# Patient Record
Sex: MALE | Race: BLACK OR AFRICAN AMERICAN | Employment: PART TIME | URBAN - METROPOLITAN AREA
[De-identification: names, ages, dates, MRNs, and addresses within clinical notes are randomized per-mention and may not be internally consistent; named-entity substitution may affect disease eponyms.]

---

## 2017-01-06 ENCOUNTER — OFFICE VISIT (OUTPATIENT)
Dept: NEUROLOGY | Age: 67
End: 2017-01-06

## 2017-01-06 ENCOUNTER — HOSPITAL ENCOUNTER (OUTPATIENT)
Dept: MRI IMAGING | Age: 67
Discharge: HOME | End: 2017-01-06
Attending: PSYCHIATRY & NEUROLOGY
Payer: COMMERCIAL

## 2017-01-06 VITALS
SYSTOLIC BLOOD PRESSURE: 142 MMHG | WEIGHT: 214.4 LBS | OXYGEN SATURATION: 97 % | BODY MASS INDEX: 29.9 KG/M2 | DIASTOLIC BLOOD PRESSURE: 86 MMHG | HEART RATE: 91 BPM

## 2017-01-06 DIAGNOSIS — M62.89 PROXIMAL WEAKNESS OF LIMB: Primary | ICD-10-CM

## 2017-01-06 DIAGNOSIS — M62.89 PROXIMAL WEAKNESS OF LIMB: ICD-10-CM

## 2017-01-06 PROCEDURE — 72141 MRI NECK SPINE W/O DYE: CPT

## 2017-01-06 NOTE — LETTER
1/6/2017 11:25 AM 
 
Patient:  Laurel Neff YOB: 1950 Date of Visit: 1/6/2017 Dear Gurvinder Gutierres MD 
[facility] [facility] [address] [place] [address] VIA In Basket 
 : Thank you for referring Mr. Gavin Glover to me for evaluation/treatment. Below are the relevant portions of my assessment and plan of care. If you have questions, please do not hesitate to call me. I look forward to following Mr. Fam Salcedo along with you. Sincerely, Barbie Chahal MD

## 2017-01-06 NOTE — MR AVS SNAPSHOT
Visit Information Date & Time Provider Department Dept. Phone Encounter #  
 1/6/2017 10:00 AM Earnest Quinn MD Neurology [address] [facility] [phone] [id number] Follow-up Instructions Return for review of results. Your Appointments 1/25/2017  8:20 AM  
ROUTINE CARE with Wesley Campa MD  
[facility] [facility]) Appt Note: 4 mnth fu  HTNHyperlipidemiaEarly am appt for fasting [address] [address]  
[place] [address] [address] Upcoming Health Maintenance Date Due ZOSTER VACCINE AGE 60> 3/18/2012 Pneumococcal 19-64 Highest Risk (3 of 3 - PPSV23) 9/1/2020 DTaP/Tdap/Td series (2 - Td) 11/11/2024 COLONOSCOPY 10/4/2026 Allergies as of 1/6/2017  Review Complete On: 1/6/2017 By: Earnest Quinn MD  
  
 Severity Noted Reaction Type Reactions Lipitor [Atorvastatin]  04/27/2010    Other (comments) Myalgia Simvastatin  04/27/2010    Other (comments)  
 myalgia Current Immunizations  Reviewed on 1/4/2016 Name Date Influenza Vaccine 10/23/2013 Influenza Vaccine (Quad) PF 9/21/2016, 10/16/2015 Influenza Vaccine Split 10/26/2012, 10/21/2011, 11/10/2010, 12/3/2009 Pneumococcal Conjugate (PCV-13) 11/30/2016 Pneumococcal Polysaccharide (PPSV-23) 9/1/2015 Tdap 11/11/2014 Not reviewed this visit You Were Diagnosed With   
  
 Codes Comments Proximal weakness of limb    -  Primary ICD-10-CM: M62.89 ICD-9-CM: 729.89 Vitals BP Pulse Weight(growth percentile) SpO2 BMI Smoking Status 142/86 91 214 lb 6.4 oz (97.3 kg) 97% 29.9 kg/m2 Former Smoker BMI and BSA Data Body Mass Index Body Surface Area  
 29.9 kg/m 2 2.21 m 2 Preferred Pharmacy Pharmacy Name Phone [address] [place], [place] - [address] [phone] Your Updated Medication List  
  
   
 This list is accurate as of: 1/6/17 11:08 AM.  Always use your most recent med list. amLODIPine 2.5 mg tablet Commonly known as:  [place] Take 1 Tab by mouth daily. aspirin-dipyridamole  mg per SR capsule Commonly known as:  AGGRENOX Take 1 Cap by mouth two (2) times a day. avanafil 100 mg tablet Commonly known as:  RYERKDG Take  by mouth as needed for Other.  
  
 ezetimibe 10 mg tablet Commonly known as:  Drew [place] TAKE ONE TABLET BY MOUTH EVERY DAY  
  
 FISH OIL 1,000 mg Cap Generic drug:  omega-3 fatty acids-vitamin e Take 1 Cap by mouth two (2) times a day. fluticasone 50 mcg/actuation nasal spray Commonly known as:  Alric Eaves 2 Sprays by Both Nostrils route daily. levothyroxine 150 mcg tablet Commonly known as:  SYNTHROID Take 1 Tab by mouth Daily (before breakfast). * MAG-OXIDE 400 mg tablet Generic drug:  magnesium oxide Take 400 mg by mouth daily. * magnesium oxide 400 mg tablet Commonly known as:  MAG-OXIDE Take 1 Tab by mouth daily. pravastatin 40 mg tablet Commonly known as:  PRAVACHOL  
TAKE ONE TABLET BY MOUTH AT NIGHT ON MONDAY, WEDNESDAY & FRIDAY, (TAKE CoQ 10 100MG TWICE DAILY IF CRAMPS OCCUR)  Indications: HYPERLIPIDEMIA  
  
 VITAMIN B-12 1,000 mcg tablet Generic drug:  cyanocobalamin Take 1,000 mcg by mouth daily. * Notice: This list has 2 medication(s) that are the same as other medications prescribed for you. Read the directions carefully, and ask your doctor or other care provider to review them with you. Follow-up Instructions Return for review of results. To-Do List   
 01/06/2017 Neurology:  EMG LIMITED   
  
 01/06/2017 Imaging:  MRI CERV SPINE WO CONT Patient Instructions PRESCRIPTION REFILL POLICY [facility] Statement to Patients April 1, 2014 In an effort to ensure the large volume of patient prescription refills is processed in the most efficient and expeditious manner, we are asking our patients to assist us by calling your Pharmacy for all prescription refills, this will include also your  Mail Order Pharmacy. The pharmacy will contact our office electronically to continue the refill process. Please do not wait until the last minute to call your pharmacy. We need at least 48 hours (2days) to fill prescriptions. We also encourage you to call your pharmacy before going to  your prescription to make sure it is ready. With regard to controlled substance prescription refill requests (narcotic refills) that need to be picked up at our office, we ask your cooperation by providing us with at least 72 hours (3days) notice that you will need a refill. We will not refill narcotic prescription refill requests after 4:00pm on any weekday, Monday through Thursday, or after 2:00pm on Fridays, or on the weekends. We encourage everyone to explore another way of getting your prescription refill request processed using [employer], our patient web portal through our electronic medical record system. [employer] is an efficient and effective way to communicate your medication request directly to the office and  downloadable as an letitia on your smart phone . [employer] also features a review functionality that allows you to view your medication list as well as leave messages for your physician. Are you ready to get connected? If so please review the attatched instructions or speak to any of our staff to get you set up right away! Thank you so much for your cooperation. Should you have any questions please contact our Practice Administrator. The Physicians and Staff,  [facility] Please provide this summary of care documentation to your next provider. Your primary care clinician is listed as [address].  If you have any questions after today's visit, please call [phone].

## 2017-01-06 NOTE — PROGRESS NOTES
NEUROLOGY NEW PATIENT OFFICE CONSULTATION      1/6/2017    RE: Eliceo Olivera         1950      REFERRED BY:  Placido Rodriguez MD        CHIEF COMPLAINT:  This is Eliceo Olivera is a 61 y.o. 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 HPI:     In Nov 30, 2016 had a pneumonia shot on the L deltoid area c/o a pharmacist at [facility]. Since then, patient, noted problem with lifting L arm.    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 Review of Systems   Constitutional: Negative for chills, fever and weight loss. All other systems reviewed and are negative.         Past Medical Hx  Past Medical History   Diagnosis Date    Benign neoplasm of colon 4/27/2010    Cancer [facility] [address]      PROSTATE    HTN (hypertension) 11/11/2014    Hypercholesterolemia     Internal hemorrhoid 4/27/2010    Keloid 4/27/2010    Mixed hyperlipidemia 4/27/2010    Neck mass 4/27/2010    Other ill-defined conditions(799.89)      HI CHOLESTEROL    Prostate cancer ([facility] [address] 4/17/2011    Smoker     Stroke [facility])      Per pt 2014    Thyroid disease      HYPO    Tobacco use disorder 4/27/2010       Social Hx  Social History     Social History    Marital status:      Spouse name: N/A    Number of children: N/A    Years of education: N/A     Social History Main Topics    Smoking status: Former Smoker     Packs/day: 0.25     Years: 40.00     Types: Cigarettes     Quit date: 2/9/2015    Smokeless tobacco: Never Used      Comment: smokes 1/2 pack week    Alcohol use 3.0 oz/week     6 Cans of beer per week      Comment: moderately    Drug use: No    Sexual activity: Yes     Other Topics Concern    None     Social History Narrative       Family Hx  Family History   Problem Relation Age of Onset    Diabetes Father     Asthma Sister     Alcohol abuse Neg Hx     Arthritis-rheumatoid Neg Hx     Bleeding Prob Neg Hx     Cancer Neg Hx     Elevated Lipids Neg Hx     Headache Neg Hx     Heart Disease Neg Hx     Hypertension Neg Hx     Lung Disease Neg Hx     Migraines Neg Hx     Psychiatric Disorder Neg Hx     Stroke Neg Hx     Mental Retardation Neg Hx        ALLERGIES  Allergies   Allergen Reactions    Lipitor [Atorvastatin] Other (comments)     Myalgia      Simvastatin Other (comments)     myalgia         CURRENT MEDS  Current Outpatient Prescriptions   Medication Sig Dispense Refill    cyanocobalamin (VITAMIN B-12) 1,000 mcg tablet Take 1,000 mcg by mouth daily.  levothyroxine (SYNTHROID) 150 mcg tablet Take 1 Tab by mouth Daily (before breakfast). 30 Tab 11    pravastatin (PRAVACHOL) 40 mg tablet TAKE ONE TABLET BY MOUTH AT NIGHT ON MONDAY, WEDNESDAY & FRIDAY, (TAKE CoQ 10 100MG TWICE DAILY IF CRAMPS OCCUR)  Indications: HYPERLIPIDEMIA 12 Tab 5    ezetimibe (ZETIA) 10 mg tablet TAKE ONE TABLET BY MOUTH EVERY DAY 90 Tab 3    fluticasone (FLONASE) 50 mcg/actuation nasal spray 2 Sprays by Both Nostrils route daily. 1 Bottle 5    aspirin-dipyridamole (AGGRENOX)  mg per SR capsule Take 1 Cap by mouth two (2) times a day. 60 Cap 5    amLODIPine (NORVASC) 2.5 mg tablet Take 1 Tab by mouth daily. 30 Tab 11    avanafil (STENDRA) 100 mg tablet Take  by mouth as needed for Other.  omega-3 fatty acids-vitamin e (FISH OIL) 1,000 mg Cap Take 1 Cap by mouth two (2) times a day.  magnesium oxide (MAG-OXIDE) 400 mg tablet Take 400 mg by mouth daily.  [CANCELED] magnesium oxide (MAG-OXIDE) 400 mg tablet Take 1 Tab by mouth daily. 90 Tab 3           PREVIOUS WORKUP: (reviewed)  IMAGING:    CT Results (recent):  No results found for this or any previous visit. MRI Results (recent):    Results from [place] encounter on 06/16/12   MRI LUMB SPINE WO CONT   Narrative **Final Report**      ICD Codes / Adm. Diagnosis: 724.02  272.2 / Spinal stenosis, lumbar region    Examination:  MR L SPINE  CON  - [id number] - Jun 16 2012  9:19AM  Accession No:  [id number]  Reason:  stenosis      REPORT:  CLINICAL HISTORY: Pain    INDICATION: Lower back pain    COMPARISON: None    TECHNIQUE: MR imaging of the lumbar spine was performed with sagittal T1,   T2, STIR;  axial T1, T2. Contrast was not administered. FINDINGS:    Congenitally narrow spinal canal.. Vertebral body heights are maintained. Discogenic marrow degenerative changes. Redundancy of the cauda equina at   L3-L4. .  The conus medullaris terminates at L2.     L1/2:  The spinal canal and neuroforamina are widely patent. L2/3:  The spinal canal and neuroforamina are widely patent. L3/4:  Facet arthropathy and hypertrophy. Ligamentum flavum hypertrophy. Discogenic marrow degenerative changes. Anterior disc osteophyte. There is a   moderate central, left paracentral and left foraminal disc protrusion   present. There is severe central canal stenosis. There is severe at left   lateral recess stenosis. There is severe left neural foraminal stenosis. L4/5:  Facet arthropathy and hypertrophy. Ligamentum flavum hypertrophy. Disc bulge. Moderate central canal stenosis. Moderate left foraminal   stenosis. Mild right foraminal stenosis. L5/S1:  Facet arthropathy and hypertrophy. Discogenic marrow degenerative   changes. Mild transverse disc protrusion. There is severe right and mild   left foraminal stenosis. Central canal is patent. The visualized musculature and intraperitoneal structures are within normal   limits       IMPRESSION:  Multilevel severe disc and facet degenerative drain superimposed on a   congenitally narrow spinal canal.    There is a moderate disc protrusion at L3-L4 with severe central canal   stenosis. There is severe left lateral recess and severe left neural foraminal   stenosis at L3-L4.     Moderate central canal stenosis and left foraminal stenosis at L4-L5. Severe right foraminal stenosis at L5-S1          Signing/Reading Doctor: Paul Woody ([id number])    Approved: EFRAIN SHELLEY ([id number])  06/18/2012                                      IR Results (recent):  No results found for this or any previous visit. VAS/US Results (recent):  No results found for this or any previous visit. LABS (reviewed)  Results for orders placed or performed in visit on 09/21/16   HEMOGLOBIN A1C WITH EAG   Result Value Ref Range    Hemoglobin A1c 5.5 4.8 - 5.6 %    Estimated average glucose 111 mg/dL   LIPID PANEL   Result Value Ref Range    Cholesterol, total 159 100 - 199 mg/dL    Triglyceride 79 0 - 149 mg/dL    HDL Cholesterol 48 >39 mg/dL    VLDL, calculated 16 5 - 40 mg/dL    LDL, calculated 95 0 - 99 mg/dL   METABOLIC PANEL, COMPREHENSIVE   Result Value Ref Range    Glucose 87 65 - 99 mg/dL    BUN 14 8 - 27 mg/dL    Creatinine 0.74 (L) 0.76 - 1.27 mg/dL    GFR est non-AA 97 >59 mL/min/1.73    GFR est  >59 mL/min/1.73    BUN/Creatinine ratio 19 10 - 22    Sodium 141 134 - 144 mmol/L    Potassium 4.8 3.5 - 5.2 mmol/L    Chloride 104 97 - 108 mmol/L    CO2 23 18 - 29 mmol/L    Calcium 10.2 8.6 - 10.2 mg/dL    Protein, total 7.5 6.0 - 8.5 g/dL    Albumin 4.4 3.6 - 4.8 g/dL    GLOBULIN, TOTAL 3.1 1.5 - 4.5 g/dL    A-G Ratio 1.4 1.1 - 2.5    Bilirubin, total 0.7 0.0 - 1.2 mg/dL    Alk. phosphatase 57 39 - 117 IU/L    AST 19 0 - 40 IU/L    ALT 18 0 - 44 IU/L   T4, FREE   Result Value Ref Range    T4, Free 1.47 0.82 - 1.77 ng/dL   TSH 3RD GENERATION   Result Value Ref Range    TSH 0.155 (L) 0.450 - 4.500 uIU/mL   HEMOGLOBIN   Result Value Ref Range    HGB 12.8 12.6 - 17.7 g/dL       Physical Exam:     Visit Vitals    /86    Pulse 91    Wt 97.3 kg (214 lb 6.4 oz)    SpO2 97%    BMI 29.9 kg/m2     General:  Alert, cooperative, no distress. Head:  Normocephalic, without obvious abnormality, atraumatic. Eyes:  Conjunctivae/corneas clear.    Lungs:  Heart:   Non labored breathing  Regular rate and rhythm, no carotid bruits   Abdomen:   Soft, non-distended   Extremities: Extremities normal, atraumatic, no cyanosis or edema. Pulses: 2+ and symmetric all extremities. Skin: Skin color, texture, turgor normal. No rashes or lesions. Neurologic Exam     Motor Exam     Strength   Strength 5/5 except as noted. Left deltoid: 0/5  Left biceps: 0/5       Left Infraspinatus 0/5  Left Supinator 0/5     Gait, Coordination, and Reflexes     Reflexes   Right brachioradialis: 3+  Left brachioradialis: 1+  Right biceps: 3+  Left biceps: 0  Right triceps: 3+  Left triceps: 2+  Right patellar: 3+  Left patellar: 2+  Right achilles: 3+  Left achilles: 1+  Right plantar: normal  Left plantar: normal       Gen: Attention normal             Language: naming, repetition, fluency normal             Memory: intact recent and remote memory  Cranial Nerves:  I: smell Not tested   II: visual fields Full to confrontation   II: pupils Equal, round, reactive to light   II: optic disc No papilledema   III,VII: ptosis none   III,IV,VI: extraocular muscles  Full ROM   V: mastication normal   V: facial light touch sensation  normal   VII: facial muscle function   symmetric   VIII: hearing symmetric   IX: soft palate elevation  normal   XI: trapezius strength  5/5   XI: sternocleidomastoid strength 5/5   XI: neck flexion strength  5/5   XII: tongue  midline     Sensory: intact to LT, PP, vibration, and JPS  Coordination: Good FTN and HTS, Romberg negative  Gait: normal gait including tandem            Impression:     Mariana Bocanegra is a 61 y.o. male who  has a past medical history of Benign neoplasm of colon (4/27/2010); Cancer ([facility] [address] HTN (hypertension) (11/11/2014); Hypercholesterolemia; Internal hemorrhoid (4/27/2010); Keloid (4/27/2010); Mixed hyperlipidemia (4/27/2010); Neck mass (4/27/2010); Other ill-defined conditions(799.89); Prostate cancer ([facility] [address] (4/17/2011); Smoker; Stroke [facility]);  Thyroid disease; and Tobacco use disorder (4/27/2010). He also has no past medical history of Abuse; Anemia NEC; Arrhythmia; Arthritis; Asthma; Autoimmune disease ([address] CAD (coronary artery disease); Calculus of kidney; Chronic kidney disease; Chronic pain; Congestive heart failure, unspecified; COPD; Depression; Diabetes ([address] GERD (gastroesophageal reflux disease); Headache(784.0); History of abuse; Liver disease; Psychotic disorder; PUD (peptic ulcer disease); Seizures ([facility] [address] Thromboembolus ([facility] [address] or Trauma. who last Nov 30, 2016 had a pneumonia shot on the L deltoid area c/o a pharmacist at [facility]. Since then, patient, noted problem with lifting L arm. Considerations include a left C5 radiculopathy (L neck pain) and L upper brachial neuritis s/p vaccine. RECOMMENDATIONS  1. MRI of cervical spine to look for degenerative disc disease  2. EMG/NCS with brachial plexopathy protocol  3. Advise not to drive for now  4. Depending on above, will consider referring back for physical therapy and prednisone     Mr. Connie Pulido has a reminder for a \"due or due soon\" health maintenance. I have asked that he contact his primary care provider for follow-up on this health maintenance. Follow-up Disposition:  Return for review of results.         Thank you for the consultation      Bonnie Milligan MD  Diplomate, American Board of Psychiatry and Neurology  Diplomate, Neuromuscular Medicine  Diplomate, American Board of Electrodiagnostic Medicine    Greater than 50% of time spent counseling patient      CC: Drew Garcia MD  Fax: [phone]

## 2017-01-06 NOTE — PATIENT INSTRUCTIONS
[facility]   Statement to Patients  April 1, 2014      In an effort to ensure the large volume of patient prescription refills is processed in the most efficient and expeditious manner, we are asking our patients to assist us by calling your Pharmacy for all prescription refills, this will include also your  Mail Order Pharmacy. The pharmacy will contact our office electronically to continue the refill process. Please do not wait until the last minute to call your pharmacy. We need at least 48 hours (2days) to fill prescriptions. We also encourage you to call your pharmacy before going to  your prescription to make sure it is ready. With regard to controlled substance prescription refill requests (narcotic refills) that need to be picked up at our office, we ask your cooperation by providing us with at least 72 hours (3days) notice that you will need a refill. We will not refill narcotic prescription refill requests after 4:00pm on any weekday, Monday through Thursday, or after 2:00pm on Fridays, or on the weekends. We encourage everyone to explore another way of getting your prescription refill request processed using [employer], our patient web portal through our electronic medical record system. [employer] is an efficient and effective way to communicate your medication request directly to the office and  downloadable as an letitia on your smart phone . [employer] also features a review functionality that allows you to view your medication list as well as leave messages for your physician. Are you ready to get connected? If so please review the attatched instructions or speak to any of our staff to get you set up right away! Thank you so much for your cooperation. Should you have any questions please contact our Practice Administrator.     The Physicians and Staff,  [facility]

## 2017-01-25 ENCOUNTER — OFFICE VISIT (OUTPATIENT)
Dept: FAMILY MEDICINE CLINIC | Age: 67
End: 2017-01-25

## 2017-01-25 VITALS
TEMPERATURE: 97.5 F | OXYGEN SATURATION: 98 % | BODY MASS INDEX: 30.21 KG/M2 | HEIGHT: 71 IN | RESPIRATION RATE: 20 BRPM | DIASTOLIC BLOOD PRESSURE: 84 MMHG | WEIGHT: 215.8 LBS | SYSTOLIC BLOOD PRESSURE: 145 MMHG | HEART RATE: 63 BPM

## 2017-01-25 DIAGNOSIS — I10 ESSENTIAL HYPERTENSION: Primary | Chronic | ICD-10-CM

## 2017-01-25 DIAGNOSIS — G56.92 NEUROPATHY, ARM, LEFT: Chronic | ICD-10-CM

## 2017-01-25 DIAGNOSIS — E03.4 HYPOTHYROIDISM DUE TO ACQUIRED ATROPHY OF THYROID: Chronic | ICD-10-CM

## 2017-01-25 DIAGNOSIS — Z86.73 HISTORY OF CVA (CEREBROVASCULAR ACCIDENT): ICD-10-CM

## 2017-01-25 DIAGNOSIS — M50.30 DDD (DEGENERATIVE DISC DISEASE), CERVICAL: ICD-10-CM

## 2017-01-25 DIAGNOSIS — E78.2 MIXED HYPERLIPIDEMIA: Chronic | ICD-10-CM

## 2017-01-25 NOTE — MR AVS SNAPSHOT
Visit Information Date & Time Provider Department Dept. Phone Encounter #  
 1/25/2017  8:20 AM Rakel Guillory, [address] [id number] Follow-up Instructions Return in about 4 months (around 5/25/2017). Your Appointments 1/26/2017  8:00 AM  
PROCEDURE with EMG SCHEDULE Neurology [address] [facility]) Appt Note: emg cl; emg cl [place] [address] [facility] [id number] [phone]  
  
   
 [place] [address] [id number] [address] Upcoming Health Maintenance Date Due ZOSTER VACCINE AGE 60> 3/18/2012 Pneumococcal 19-64 Highest Risk (3 of 3 - PPSV23) 9/1/2020 DTaP/Tdap/Td series (2 - Td) 11/11/2024 COLONOSCOPY 10/4/2026 Allergies as of 1/25/2017  Review Complete On: 1/25/2017 By: Rakel Guillory MD  
  
 Severity Noted Reaction Type Reactions Lipitor [Atorvastatin]  04/27/2010    Other (comments) Myalgia Simvastatin  04/27/2010    Other (comments)  
 myalgia Current Immunizations  Reviewed on 1/4/2016 Name Date Influenza Vaccine 10/23/2013 Influenza Vaccine (Quad) PF 9/21/2016, 10/16/2015 Influenza Vaccine Split 10/26/2012, 10/21/2011, 11/10/2010, 12/3/2009 Pneumococcal Conjugate (PCV-13) 11/30/2016 Pneumococcal Polysaccharide (PPSV-23) 9/1/2015 Tdap 11/11/2014 Not reviewed this visit You Were Diagnosed With   
  
 Codes Comments Essential hypertension    -  Primary ICD-10-CM: I10 
ICD-9-CM: 401.9 Mixed hyperlipidemia     ICD-10-CM: E78.2 ICD-9-CM: 272.2 Hypothyroidism due to acquired atrophy of thyroid     ICD-10-CM: E03.4 ICD-9-CM: 244.8, 246.8 History of CVA (cerebrovascular accident)     ICD-10-CM: Z86.73 
ICD-9-CM: V12.54 Vitals BP Pulse Temp Resp Height(growth percentile) Weight(growth percentile) 145/84 (BP 1 Location: Right arm, BP Patient Position: Sitting) 63 97.5 °F (36.4 °C) (Oral) 20 5' 11\" (1.803 m) 215 lb 12.8 oz (97.9 kg) SpO2 BMI Smoking Status 98% 30.1 kg/m2 Former Smoker Vitals History BMI and BSA Data Body Mass Index Body Surface Area  
 30.1 kg/m 2 2.21 m 2 Preferred Pharmacy Pharmacy Name Phone [address] [place], [place] - [address] [phone] Your Updated Medication List  
  
   
This list is accurate as of: 1/25/17  8:51 AM.  Always use your most recent med list. amLODIPine 2.5 mg tablet Commonly known as:  Wandra Denice TAKE ONE TABLET BY MOUTH EVERY DAY  
  
 aspirin-dipyridamole  mg per SR capsule Commonly known as:  AGGRENOX Take 1 Cap by mouth two (2) times a day. avanafil 100 mg tablet Commonly known as:  YGHBJJH Take  by mouth as needed for Other.  
  
 ezetimibe 10 mg tablet Commonly known as:  Buel Mace TAKE ONE TABLET BY MOUTH EVERY DAY  
  
 FISH OIL 1,000 mg Cap Generic drug:  omega-3 fatty acids-vitamin e Take 1 Cap by mouth two (2) times a day. fluticasone 50 mcg/actuation nasal spray Commonly known as:  Filbert Chard 2 Sprays by Both Nostrils route daily. levothyroxine 150 mcg tablet Commonly known as:  SYNTHROID Take 1 Tab by mouth Daily (before breakfast). * MAG-OXIDE 400 mg tablet Generic drug:  magnesium oxide Take 400 mg by mouth daily. * magnesium oxide 400 mg tablet Commonly known as:  MAG-OXIDE Take 1 Tab by mouth daily. pravastatin 40 mg tablet Commonly known as:  PRAVACHOL  
TAKE ONE TABLET BY MOUTH AT NIGHT ON MONDAY, WEDNESDAY & FRIDAY, (TAKE CoQ 10 100MG TWICE DAILY IF CRAMPS OCCUR)  Indications: HYPERLIPIDEMIA  
  
 VITAMIN B-12 1,000 mcg tablet Generic drug:  cyanocobalamin Take 1,000 mcg by mouth daily. * Notice:   This list has 2 medication(s) that are the same as other medications prescribed for you. Read the directions carefully, and ask your doctor or other care provider to review them with you. We Performed the Following HEMOGLOBIN V9374387 CPT(R)] LIPID PANEL [90324 CPT(R)] METABOLIC PANEL, COMPREHENSIVE [63761 CPT(R)] T4, FREE V9043623 CPT(R)] TSH 3RD GENERATION [47539 CPT(R)] Follow-up Instructions Return in about 4 months (around 5/25/2017). Patient Instructions High Cholesterol: Care Instructions Your Care Instructions Cholesterol is a type of fat in your blood. It is needed for many body functions, such as making new cells. Cholesterol is made by your body. It also comes from food you eat. High cholesterol means that you have too much of the fat in your blood. This raises your risk of a heart attack and stroke. LDL and HDL are part of your total cholesterol. LDL is the \"bad\" cholesterol. High LDL can raise your risk for heart disease, heart attack, and stroke. HDL is the \"good\" cholesterol. It helps clear bad cholesterol from the body. High HDL is linked with a lower risk of heart disease, heart attack, and stroke. Your cholesterol levels help your doctor find out your risk for having a heart attack or stroke. You and your doctor can talk about whether you need to lower your risk and what treatment is best for you. A heart-healthy lifestyle along with medicines can help lower your cholesterol and your risk. The way you choose to lower your risk will depend on how high your risk is for heart attack and stroke. It will also depend on how you feel about taking medicines. Follow-up care is a key part of your treatment and safety. Be sure to make and go to all appointments, and call your doctor if you are having problems. It's also a good idea to know your test results and keep a list of the medicines you take. How can you care for yourself at home? · Eat a variety of foods every day.  Good choices include fruits, vegetables, whole grains (like oatmeal), dried beans and peas, nuts and seeds, soy products (like tofu), and fat-free or low-fat dairy products. · Replace butter, margarine, and hydrogenated or partially hydrogenated oils with olive and canola oils. (Canola oil margarine without trans fat is fine.) · Replace red meat with fish, poultry, and soy protein (like tofu). · Limit processed and packaged foods like chips, crackers, and cookies. · Bake, broil, or steam foods. Don't shaffer them. · Be physically active. Get at least 30 minutes of exercise on most days of the week. Walking is a good choice. You also may want to do other activities, such as running, swimming, cycling, or playing tennis or team sports. · Stay at a healthy weight or lose weight by making the changes in eating and physical activity listed above. Losing just a small amount of weight, even 5 to 10 pounds, can reduce your risk for having a heart attack or stroke. · Do not smoke. When should you call for help? Watch closely for changes in your health, and be sure to contact your doctor if: 
· You need help making lifestyle changes. · You have questions about your medicine. Where can you learn more? Go to http://rosa-becky.info/. Enter Y101 in the search box to learn more about \"High Cholesterol: Care Instructions. \" Current as of: January 27, 2016 Content Version: 11.1 © 8561-7741 [employer]. Care instructions adapted under license by [employer] (which disclaims liability or warranty for this information). If you have questions about a medical condition or this instruction, always ask your healthcare professional. [place] [address] any warranty or liability for your use of this information. Please provide this summary of care documentation to your next provider. Your primary care clinician is listed as [address].  If you have any questions after today's visit, please call [phone].

## 2017-01-25 NOTE — PATIENT INSTRUCTIONS

## 2017-01-25 NOTE — PROGRESS NOTES
Chief Complaint   Patient presents with    Cholesterol Problem     Fasting    Leg Pain     Right Leg, & hip pain     Body mass index is 30.1 kg/(m^2).     Reviewed record in preparation for visit and have necessary documentation  Pt did not bring medication to office visit for review  Information was given to pt on Advanced Directives, Living Will  Information was given on Shingles Vaccine  Opportunity was given for questions  Goals that were addressed and/or need to be completed after this appointment include:     Health Maintenance Due   Topic Date Due    ZOSTER VACCINE AGE 60>  03/18/2012

## 2017-01-26 ENCOUNTER — OFFICE VISIT (OUTPATIENT)
Dept: NEUROLOGY | Age: 67
End: 2017-01-26

## 2017-01-26 DIAGNOSIS — M54.12 CERVICAL RADICULOPATHY AT C5: Primary | ICD-10-CM

## 2017-01-26 LAB
ALBUMIN SERPL-MCNC: 4.3 G/DL (ref 3.6–4.8)
ALBUMIN/GLOB SERPL: 1.3 {RATIO} (ref 1.1–2.5)
ALP SERPL-CCNC: 65 IU/L (ref 39–117)
ALT SERPL-CCNC: 17 IU/L (ref 0–44)
AST SERPL-CCNC: 23 IU/L (ref 0–40)
BILIRUB SERPL-MCNC: 0.4 MG/DL (ref 0–1.2)
BUN SERPL-MCNC: 13 MG/DL (ref 8–27)
BUN/CREAT SERPL: 17 (ref 10–22)
CALCIUM SERPL-MCNC: 9.9 MG/DL (ref 8.6–10.2)
CHLORIDE SERPL-SCNC: 103 MMOL/L (ref 96–106)
CHOLEST SERPL-MCNC: 169 MG/DL (ref 100–199)
CO2 SERPL-SCNC: 23 MMOL/L (ref 18–29)
CREAT SERPL-MCNC: 0.78 MG/DL (ref 0.76–1.27)
GLOBULIN SER CALC-MCNC: 3.2 G/DL (ref 1.5–4.5)
GLUCOSE SERPL-MCNC: 92 MG/DL (ref 65–99)
HDLC SERPL-MCNC: 57 MG/DL
HGB BLD-MCNC: 12.9 G/DL (ref 12.6–17.7)
LDLC SERPL CALC-MCNC: 97 MG/DL (ref 0–99)
POTASSIUM SERPL-SCNC: 4.4 MMOL/L (ref 3.5–5.2)
PROT SERPL-MCNC: 7.5 G/DL (ref 6–8.5)
SODIUM SERPL-SCNC: 141 MMOL/L (ref 134–144)
T4 FREE SERPL-MCNC: 1.28 NG/DL (ref 0.82–1.77)
TRIGL SERPL-MCNC: 77 MG/DL (ref 0–149)
TSH SERPL DL<=0.005 MIU/L-ACNC: 0.91 UIU/ML (ref 0.45–4.5)
VLDLC SERPL CALC-MCNC: 15 MG/DL (ref 5–40)

## 2017-01-26 NOTE — PROGRESS NOTES
EMG/NCS done. See Procedure Note for results. Discussed EMG/NCS of the L UE showing L C5/C6 motor radiculopathy, severe.  Also L median neuropathy at or distal to the wrist, severe    Reviewed MRI cervical spine with patient: Congenitally narrowed canal with superimposed degenerative changes detailed  above with severe narrowing of the canal at C3-4, C5-6 and C6-7    P> Will refer to neurosurgical associates Doyle Aguayo MD) for surgical intervention    Danielle Kendrick MD

## 2017-01-26 NOTE — PROGRESS NOTES
Progress Note    Patient: Rhina Garsia MRN: [id number]  SSN: [id number]    YOB: 1950  Age: 61 y.o. Sex: male        Chief Complaint   Patient presents with    Cholesterol Problem     Fasting    Leg Pain     Right Leg, & hip pain         Subjective:     Encounter Diagnoses   Name Primary?  Essential hypertension:   BP Readings from Last 3 Encounters:   01/25/17 145/84   01/06/17 142/86   12/20/16 (!) 149/91     The patient reports:  taking medications as instructed, no medication side effects noted, no TIA's, no chest pain on exertion, no dyspnea on exertion, no swelling of ankles. Lab Results   Component Value Date/Time    Sodium 141 01/25/2017 09:19 AM    Potassium 4.4 01/25/2017 09:19 AM    Chloride 103 01/25/2017 09:19 AM    CO2 23 01/25/2017 09:19 AM    Anion gap 6 05/24/2011 02:28 PM    Glucose 92 01/25/2017 09:19 AM    BUN 13 01/25/2017 09:19 AM    Creatinine 0.78 01/25/2017 09:19 AM    BUN/Creatinine ratio 17 01/25/2017 09:19 AM    GFR est  01/25/2017 09:19 AM    GFR est non-AA 95 01/25/2017 09:19 AM    Calcium 9.9 01/25/2017 09:19 AM    Bilirubin, total 0.4 01/25/2017 09:19 AM    ALT 17 01/25/2017 09:19 AM    AST 23 01/25/2017 09:19 AM    Alk. phosphatase 65 01/25/2017 09:19 AM    Protein, total 7.5 01/25/2017 09:19 AM    Albumin 4.3 01/25/2017 09:19 AM    Globulin 3.9 05/24/2011 02:28 PM    A-G Ratio 1.3 01/25/2017 09:19 AM     Our goal is to normalize the blood pressure to decrease the risks of strokes and heart attacks. The patient is in agreement with the plan. Yes    Mixed hyperlipidemia:  Cardiovascular risks for him are: LDL goal is under 80  hypertension  hyperlipidemia  prior CVA/TIA or known carotid artery disease. Currently he takes Pravachol (pravastatin) , 40 mg.   Lab Results   Component Value Date/Time    Cholesterol, total 169 01/25/2017 09:19 AM    Cholesterol, Total 179 05/13/2015 10:35 AM    HDL Cholesterol 57 01/25/2017 09:19 AM    LDL, calculated 97 01/25/2017 09:19 AM    Triglyceride 77 01/25/2017 09:19 AM    CHOL/HDL Ratio 3.9 11/10/2010 01:21 AM     Lab Results   Component Value Date/Time    ALT 17 01/25/2017 09:19 AM    AST 23 01/25/2017 09:19 AM    Alk. phosphatase 65 01/25/2017 09:19 AM    Bilirubin, total 0.4 01/25/2017 09:19 AM      Myalgias: No   Fatigue: No   Other side effects: no  Wt Readings from Last 3 Encounters:   01/25/17 215 lb 12.8 oz (97.9 kg)   01/06/17 214 lb 6.4 oz (97.3 kg)   12/20/16 212 lb (96.2 kg)     The patient is aware of our goal to reduce or eliminate the long term problems (such as strokes and heart attacks) related to poorly controlled hyperlipidemia.  Hypothyroidism due to acquired atrophy of thyroid:  Lab Results   Component Value Date/Time    TSH 0.906 01/25/2017 09:19 AM    T4, Free 1.28 01/25/2017 09:19 AM    T4, Total 9.6 11/10/2010 01:21 AM      Denies fatigue, nervousness,weight changes, heat orcold intolerance, bowel changes,skin changes, cardiovascular symptoms, hair loss, feeling excessive energy, tremor, palpitations and weight loss. Thyroid medication has been unchanged since last medication check and labs.  History of CVA (cerebrovascular accident): He has had no further symptoms of CVA. He no longer smokes. His blood pressure and cholesterol are higher than I would like to see you today and will be addressed in his lab letter.  Neuropathy, arm, left: This is the first time I have seen him with this problem. It is extremely unusual is lost use of his left arm but maintain use of his left hand. This could be from his cervical degenerative disc disease or some sort of like a brachial plexus problem. I seriously doubt it has anything to do with his Prevnar 13 shot that he got.  DDD (degenerative disc disease), cervical: He has an EMG scheduled. This will tell us where the nerve problem is. MRI:  IMPRESSION:  1.  Congenitally narrowed canal with superimposed degenerative changes detailed  above with severe narrowing of the canal at C3-4, C5-6 and C6-7  2. Mass lesion anterior neck under the floor of mouth. This has been previously  evaluated by ultrasound. (This is a thyroglossal cyst.)                 Current and past medical information:    Current Medications after this visit[de-identified]   Current Outpatient Prescriptions   Medication Sig    amLODIPine (NORVASC) 2.5 mg tablet TAKE ONE TABLET BY MOUTH EVERY DAY    cyanocobalamin (VITAMIN B-12) 1,000 mcg tablet Take 1,000 mcg by mouth daily.  levothyroxine (SYNTHROID) 150 mcg tablet Take 1 Tab by mouth Daily (before breakfast).  pravastatin (PRAVACHOL) 40 mg tablet TAKE ONE TABLET BY MOUTH AT NIGHT ON MONDAY, WEDNESDAY & FRIDAY, (TAKE CoQ 10 100MG TWICE DAILY IF CRAMPS OCCUR)  Indications: HYPERLIPIDEMIA    ezetimibe (ZETIA) 10 mg tablet TAKE ONE TABLET BY MOUTH EVERY DAY    aspirin-dipyridamole (AGGRENOX)  mg per SR capsule Take 1 Cap by mouth two (2) times a day.  avanafil (STENDRA) 100 mg tablet Take  by mouth as needed for Other.  omega-3 fatty acids-vitamin e (FISH OIL) 1,000 mg Cap Take 1 Cap by mouth two (2) times a day.  magnesium oxide (MAG-OXIDE) 400 mg tablet Take 400 mg by mouth daily.  fluticasone (FLONASE) 50 mcg/actuation nasal spray 2 Sprays by Both Nostrils route daily.  [CANCELED] magnesium oxide (MAG-OXIDE) 400 mg tablet Take 1 Tab by mouth daily. No current facility-administered medications for this visit.         Patient Active Problem List    Diagnosis Date Noted    Mixed hyperlipidemia 04/27/2010     Priority: 1 - One    Benign neoplasm of colon 04/27/2010     Priority: 1 - One    Tobacco use disorder 04/27/2010     Priority: 1 - One    Keloid 04/27/2010     Priority: 1 - One    Internal hemorrhoid 04/27/2010     Priority: 1 - One    Thyroglossal duct cyst 09/20/2010     Priority: 3 - Three    History of CVA (cerebrovascular accident) 05/22/2015    HTN (hypertension) 11/11/2014    Prostate cancer ([facility] 04/17/2011    Hypothyroidism 09/25/2010    Boil 09/20/2010       Past Medical History   Diagnosis Date    Benign neoplasm of colon 4/27/2010    Cancer ([facility]      PROSTATE    HTN (hypertension) 11/11/2014    Hypercholesterolemia     Internal hemorrhoid 4/27/2010    Keloid 4/27/2010    Mixed hyperlipidemia 4/27/2010    Neck mass 4/27/2010    Other ill-defined conditions(799.89)      HI CHOLESTEROL    Prostate cancer ([facility] 4/17/2011    Smoker     Stroke ([facility]      Per pt 2014    Thyroid disease      HYPO    Tobacco use disorder 4/27/2010       Allergies   Allergen Reactions    Lipitor [Atorvastatin] Other (comments)     Myalgia      Simvastatin Other (comments)     myalgia         Past Surgical History   Procedure Laterality Date    Hx splenectomy  1969     adhesions----1997    Hx other surgical       KELOIDS--BACK    Hx gi       COLONOSCOPY    Hx prostatectomy       PROSTATE BIOPSY       Social History     Social History    Marital status:      Spouse name: N/A    Number of children: N/A    Years of education: N/A     Social History Main Topics    Smoking status: Former Smoker     Packs/day: 0.25     Years: 40.00     Types: Cigarettes     Quit date: 2/9/2015    Smokeless tobacco: Never Used      Comment: smokes 1/2 pack week    Alcohol use 3.0 oz/week     6 Cans of beer per week      Comment: moderately    Drug use: No    Sexual activity: Yes     Other Topics Concern    None     Social History Narrative       Review of Systems   Constitutional: Negative. Negative for chills, fever, malaise/fatigue and weight loss. HENT: Negative. Negative for hearing loss. Eyes: Negative. Negative for blurred vision and double vision. Respiratory: Negative. Negative for cough, hemoptysis, sputum production and shortness of breath. Cardiovascular: Negative. Negative for chest pain, palpitations and orthopnea.    Gastrointestinal: Negative. Negative for abdominal pain, blood in stool, heartburn, nausea and vomiting. Genitourinary: Negative. Negative for dysuria, frequency and urgency. Musculoskeletal: Negative. Negative for back pain, myalgias and neck pain. Skin: Negative. Negative for rash. Neurological: Negative. Negative for dizziness, tingling, tremors, weakness and headaches. Disuse of his left arm. He has absolutely no strength to bend at the elbow. Innervation of the biceps is C5   Endo/Heme/Allergies: Negative. Psychiatric/Behavioral: Negative. Negative for depression. Objective:     Vitals:    01/25/17 0815   BP: 145/84   Pulse: 63   Resp: 20   Temp: 97.5 °F (36.4 °C)   TempSrc: Oral   SpO2: 98%   Weight: 215 lb 12.8 oz (97.9 kg)   Height: 5' 11\" (1.803 m)      Body mass index is 30.1 kg/(m^2). Physical Exam   Constitutional: He is oriented to person, place, and time and well-developed, well-nourished, and in no distress. HENT:   Head: Normocephalic and atraumatic. Mouth/Throat: Oropharynx is clear and moist.   Eyes: Right eye exhibits no discharge. Left eye exhibits no discharge. No scleral icterus. Neck: No tracheal deviation present. No thyromegaly present. No bruit. Cardiovascular: Normal rate, regular rhythm and normal heart sounds. Pulmonary/Chest: Effort normal and breath sounds normal.   Abdominal: Soft. Musculoskeletal:   Unusual paralysis of his left arm. Awaiting EMG and neurology opinion as to its etiology. Neurological: He is alert and oriented to person, place, and time. Skin: No rash noted. No erythema. Psychiatric: Mood and affect normal.   Nursing note and vitals reviewed. Health Maintenance Due   Topic Date Due    ZOSTER VACCINE AGE 60>  03/18/2012       Assessment and orders:       ICD-10-CM ICD-9-CM    1. Essential hypertension-I would like to see his blood pressure below 140  N46 139.6 METABOLIC PANEL, COMPREHENSIVE      HEMOGLOBIN   2.  Mixed hyperlipidemia- like to see his LDL below 80  E78.2 272.2 LIPID PANEL      METABOLIC PANEL, COMPREHENSIVE   3. Hypothyroidism due to acquired atrophy of thyroid-retest  E03.4 244.8 TSH 3RD GENERATION     246.8 T4, FREE   4. History of CVA (cerebrovascular accident)-no recurrent symptoms  Z86.73 V12.54 LIPID PANEL      METABOLIC PANEL, COMPREHENSIVE   5. Neuropathy, arm, left-await neurology diagnoses and recommendations    G56.92 354.9    6. DDD (degenerative disc disease), cervical - MRI results in chart  M50.30 722.4          Plan of care:  Discussed diagnoses in detail with patient. Medication risks/benefits/side effects discussed with patient. All of the patient's questions were addressed. The patient understands and agrees with our plan of care. The patient knows to call back if they are unsure of or forget any changes we discussed today or if the symptoms change. The patient received an After-Visit Summary which contains VS, orders, medication list and allergy list. This can be used as a \"mini-medical record\" should they have to seek medical care while out of town. Patient Care Team:  Stacey Stewart MD as PCP - General (Family Practice)  Sonali Ayala MD (Orthopedic Surgery)  Toño Corona MD as Physician (Gastroenterology)    Follow-up Disposition:  Return in about 4 months (around 5/25/2017).     Future Appointments  Date Time Provider Department Center   5/26/2017 8:20 Milan Reich MD [facility] SCHED       Signed By: Stacey Stewart MD     January 26, 2017

## 2017-01-26 NOTE — PROCEDURES
EMG/ NCS Report  [facility]  Summer Mitchellvængoscar Santana  Ph. [phone]  Test Date:  2017    Patient: Vella Essex : 1950 Physician: Young Morrison MD   Sex: Male Height: ' \" Ref Phys: Atul Tolentino MD   ID#: [id number] Weight:  lbs. Technician: Allison Salinas       Patient History / Exam:  In 2016 had a pneumonia shot on the L deltoid area c/o a pharmacist at [facility]. Since then, patient, noted problem with lifting L arm. 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 Patient is coming for radiculopathy and plexopathy evaluation. Exam: Patient awake, alert, follows commands, clear speech; hearing grossly intact; EOMI, (-) facial asymmetry, tongue midline; Motor: L deltoid, biceps, infraspinatus and supinator 1/5, the rest is 5/5; Sensory: intact to LT, PP, JPS; DTRs absent L biceps and dec L brachioradialis; Good FTN and HTS; Gait: stable      EMG & NCV Findings:  Evaluation of the Left median motor nerve showed prolonged distal onset latency (9.2 ms), normal amplitude (5.4 mV), and normal conduction velocity (Elbow-Wrist, 50 m/s). The Left ulnar motor nerve showed normal distal onset latency (3.1 ms), normal amplitude (7.5 mV), normal conduction velocity (B Elbow-Wrist, 58 m/s), and normal conduction velocity (A Elbow-B Elbow, 53 m/s). The Left lateral antebrachial cutaneous sensory and the Left medial antebrachial cutaneous sensory nerves showed normal distal peak latency (L2.3, L2.3 ms) and normal amplitude (L23.3, L7.2 µV). The Left median sensory nerve showed no response (Wrist). The Left radial sensory nerve showed normal distal peak latency (2.1 ms), normal amplitude (38.2 µV), and normal conduction velocity [facility] 1st Digit, 63 m/s).   The Left ulnar sensory nerve showed normal distal peak latency (2.9 ms), normal amplitude (15.6 µV), and decreased conduction velocity Located within [facility] Digit, 44 m/s). All F Wave latencies were within normal limits. Needle evaluation of the Left abductor pollicis brevis muscle showed diminished recruitment. The Left biceps muscle showed increased insertional activity, slightly increased spontaneous activity, recruitment, Incr Duration, increased motor unit amplitude, and very increased polyphasic potentials. The Left deltoid muscle showed increased insertional activity, slightly increased spontaneous activity, recruitment, Incr Duration, increased motor unit amplitude, and moderately increased polyphasic potentials. The Left infraspinatus muscle showed increased insertional activity, moderately increased spontaneous activity, recruitment, Incr Duration, increased motor unit amplitude, and moderately increased polyphasic potentials. The Left mid cervical paraspinal muscle showed increased insertional activity and slightly increased spontaneous activity. All remaining muscles (as indicated in the following table) showed no evidence of electrical instability. Impression:  ABNORMAL    Extensive electrodiagnostic examination of the left upper  extremity shows the followin) Findings suggestive of a subacute L C5/C6 motor radiculopathy, severe in degree electrically. 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 There is no evidence of a left brachial plexopathy.             David Contreras MD  Diplomate, American Board of Psychiatry and Neurology  Diplomate, Neuromuscular Medicine  Diplomate, American Board of Electrodiagnostic Medicine              Nerve Conduction Studies  Anti Sensory Summary Table     Site NR Peak (ms) Norm Peak (ms) P-T Amp (µV) Norm P-T Amp Site1 Site2 Dist (cm)   Left Lat Ante Brach Cutan Anti Sensory (Lat Forearm)  28.3°C   Lat Biceps    2.3 <3.0 23.3 >10 Lat Biceps Lat Forearm 12.0   Left Med Ante Brach Cutan Anti Sensory (Med Forearm)  28.7°C   Elbow    2.3 <3.2 7.2 >5.0 Elbow Med Forearm 12.0   Left Median Anti Sensory (2nd Digit)  30.4°C   Wrist NR  <3.8  >10 Wrist 2nd Digit 13.0   Left Radial Anti Sensory (Base 1st Digit)  31.1°C   Wrist    2.1 <2.8 38.2 >10 Wrist Base 1st Digit 10.0   Left Ulnar Anti Sensory (5th Digit)  30.9°C   Wrist    2.9 <3.2 15.6 >5 Wrist 5th Digit 11.0   B Elbow    3.1  15.7  B Elbow Wrist 0.0     Motor Summary Table     Site NR Onset (ms) Norm Onset (ms) O-P Amp (mV) Norm O-P Amp Site1 Site2 Dist (cm) Ibrahima (m/s)   Left Median Motor (Abd Poll Brev)  28.9°C   Wrist    9.2 <4.0 5.4 >5.0 Wrist Abd Poll Brev 5.0    Elbow    14.0  5.1  Elbow Wrist 24.0 50   Left Ulnar Motor (Abd Dig Minimi)  29.5°C   Wrist    3.1 <3.1 7.5 >7.0 Wrist Abd Dig Minimi 5.0    B Elbow    7.3  6.8  B Elbow Wrist 24.5 58   A Elbow    9.2  6.5  A Elbow B Elbow 10.0 53     F Wave Studies     NR F-Lat (ms) Lat Norm (ms) L-R F-Lat (ms) L-R Lat Norm   Left Ulnar (Mrkrs) (Abd Dig Min)  28.8°C      30.23 <32  <2.5     EMG     Side Muscle Nerve Root Ins Act Fibs Psw Recrt Duration Amp Poly Comment   Left 1stDorInt Ulnar C8-T1 Nml Nml Nml Nml Nml Nml Nml    Left ExtIndicis Radial (Post Int) C7-8 Nml Nml Nml Nml Nml Nml Nml    Left Abd Poll Brev Median C8-T1 Nml Nml Nml Reduced Nml Nml Nml    Left PronatorTeres Median C6-7 Nml Nml Nml Nml Nml Nml Nml    Left Biceps Musculocut C5-6 Incr 1+ 1+ SMU Incr Incr 3+    Left Triceps Radial C6-7-8 Nml Nml Nml Nml Nml Nml Nml    Left Deltoid Axillary C5-6 Incr 1+ 1+ SMU Incr Incr 2+    Left Infraspinatus SupraScap C5-6 Incr 1+ 2+ SMU Incr Incr 2+    Left Mid Cerv Parasp Rami C5,6 Incr 1+ 1+ Nml Nml Nml Nml        Nerve Conduction Studies  Anti Sensory Left/Right Comparison     Site L Lat (ms) R Lat (ms) L-R Lat (ms) L Amp (µV) R Amp (µV) L-R Amp (%) Site1 Site2 L Ibrahima (m/s) R Ibrahima (m/s) L-R Ibrahima (m/s)   Lat Ante Brach Cutan Anti Sensory (Lat Forearm)  28.3°C   Lat Biceps 1.6   23.3   Lat Biceps Lat Forearm 75 Med Ante Brach Cutan Anti Sensory (Med Forearm)  28.7°C   Elbow 1.8   7.2   Elbow Med Forearm 67     Median Anti Sensory (2nd Digit)  30.4°C   Wrist       Wrist 2nd Digit      Radial Anti Sensory (Base 1st Digit)  31.1°C   Wrist 1.6   38.2   Wrist Base 1st Digit 63     Ulnar Anti Sensory (5th Digit)  30.9°C   Wrist 2.5   15.6   Wrist 5th Digit 44     B Elbow 2.5   15.7   B Elbow Wrist        Motor Left/Right Comparison     Site L Lat (ms) R Lat (ms) L-R Lat (ms) L Amp (mV) R Amp (mV) L-R Amp (%) Site1 Site2 L Ibrahima (m/s) R Ibrahima (m/s) L-R Ibrahima (m/s)   Median Motor (Abd Poll Brev)  28.9°C   Wrist 9.2   5.4   Wrist Abd Poll Brev      Elbow 14.0   5.1   Elbow Wrist 50     Ulnar Motor (Abd Dig Minimi)  29.5°C   Wrist 3.1   7.5   Wrist Abd Dig Minimi      B Elbow 7.3   6.8   B Elbow Wrist 58     A Elbow 9.2   6.5   A Elbow B Elbow 53           Waveforms:

## 2017-03-08 RX ORDER — ASPIRIN AND DIPYRIDAMOLE 25; 200 MG/1; MG/1
1 CAPSULE, EXTENDED RELEASE ORAL 2 TIMES DAILY
Qty: 60 CAP | Refills: 11 | Status: SHIPPED | OUTPATIENT
Start: 2017-03-08 | End: 2018-04-23 | Stop reason: ALTCHOICE

## 2017-03-29 ENCOUNTER — TELEPHONE (OUTPATIENT)
Dept: FAMILY MEDICINE CLINIC | Age: 67
End: 2017-03-29

## 2017-03-29 ENCOUNTER — PATIENT OUTREACH (OUTPATIENT)
Dept: FAMILY MEDICINE CLINIC | Age: 67
End: 2017-03-29

## 2017-03-29 RX ORDER — SENNOSIDES 8.6 MG/1
1 TABLET ORAL DAILY
COMMUNITY
End: 2022-02-08

## 2017-03-29 RX ORDER — ENOXAPARIN SODIUM 100 MG/ML
100 INJECTION SUBCUTANEOUS EVERY 12 HOURS
COMMUNITY
End: 2017-04-07

## 2017-03-29 RX ORDER — WARFARIN SODIUM 5 MG/1
5 TABLET ORAL DAILY
COMMUNITY
End: 2017-05-10 | Stop reason: SDUPTHER

## 2017-03-29 NOTE — PATIENT INSTRUCTIONS
Enoxaparin (By injection)   Enoxaparin (ee-nox-a-PAR-in)  Prevents and treats blood clots. Also treats heart attacks. This medicine is a blood thinner. Brand Name(s):Amerinet Choice Enoxaparin Sodium, Lovenox, Novaplus Enoxaparin Sodium, PremierPro Rx Lovenox   There may be other brand names for this medicine. When This Medicine Should Not Be Used: You should not use this medicine if you have had an allergic reaction to enoxaparin, heparin, benzyl alcohol, or products made from pork. You should not use enoxaparin if you have bleeding disorders or any active bleeding. How to Use This Medicine:   Injectable  · Your doctor will prescribe your exact dose and tell you how often it should be given. This medicine is given as a shot under your skin. · A nurse or other health provider will give you this medicine. It may also be given by a home health caregiver. · You may be taught how to give your medicine at home. Make sure you understand all instructions before giving yourself an injection. Do not use more medicine or use it more often than your doctor tells you to. · You will be shown the body areas where this shot can be given. Use a different body area each time you give yourself a shot. Keep track of where you give each shot to make sure you rotate body areas. · Use a new needle and syringe each time you inject your medicine. If a dose is missed:   · You must use this medicine on a fixed schedule. Call your doctor or pharmacist if you miss a dose. How to Store and Dispose of This Medicine:   · If you store this medicine at home, keep it at room temperature, away from heat and direct light. · If you were given a bottle of medicine to use with your syringes, you must use the medicine within 28 days after the first shot. Throw away the unused medicine in the bottle after 28 days. · Throw away used needles in a hard, closed container that the needles cannot poke through.  Keep this container away from children and pets. · Ask your pharmacist, doctor, or health caregiver about the best way to dispose of any leftover medicine, containers, and other supplies. Throw away old medicine after the expiration date has passed. · Keep all medicine out of the reach of children. Never share your medicine with anyone. Drugs and Foods to Avoid:   Ask your doctor or pharmacist before using any other medicine, including over-the-counter medicines, vitamins, and herbal products. · Make sure your doctor knows if you are also using blood thinners (such as clopidogrel, warfarin, or Coumadin®). Tell your doctor if you are also using dipyridamole (Persantine®), ketorolac (Toradol®), or sulfinpyrazone (Anturane®). · Make sure your doctor knows if you are using pain or arthritis medicine (such as aspirin, Advil®, Aleve®, Motrin®, Orudis®, Dolobid®, [place], Indocin®, Relafen®, or Voltaren®). Avoid taking aspirin or medicines that contain aspirin, unless your doctor tells you to. Warnings While Using This Medicine:   · Make sure your doctor knows if you are pregnant or breastfeeding, or if you have liver disease, kidney disease, blood vessel problems, diabetes, a heart infection, uncontrolled high blood pressure, a stomach ulcer or bleeding, or a bleeding disorder such as hemophilia. Tell your doctor if you have a bleeding disorder caused by heparin. · Make sure your doctor knows if you have recently had a stroke, or surgery on your eyes, brain, or spine. Tell your doctor if you have had a heart valve replaced. · This medicine may cause bleeding or bruising. This risk is higher if you have a catheter in your back for pain medicine or anesthesia (sometimes called an \"epidural\"), or if you have kidney problems. The risk of bleeding increases if your kidney problems get worse. Discuss this with your doctor if you are concerned. · You may bleed and bruise more easily while you are using this medicine.  Be extra careful to avoid injuries until the effects of the medicine have worn off. Stay away from rough sports or other situations where you could be bruised, cut, or injured. Brush and floss your teeth gently. Be careful when using sharp objects, including razors and fingernail clippers. Avoid picking your nose. If you need to blow your nose, blow it gently. · Watch for any bleeding from open areas such as around the injection site. Also check for blood in your urine or stool. If you have any bleeding or injuries, tell your doctor right away. · Tell any doctor or dentist who treats you that you are using this medicine. You may need to stop using this medicine several days before you have surgery or medical tests. · Your doctor will do lab tests at regular visits to check on the effects of this medicine. Keep all appointments. Possible Side Effects While Using This Medicine:   Call your doctor right away if you notice any of these side effects:  · Allergic reaction: Itching or hives, swelling in your face or hands, swelling or tingling in your mouth or throat, chest tightness, trouble breathing  · Blood in your urine. · Bloody or black, tarry stools. · Chest pain, shortness of breath, or coughing up blood. · Fever. · Large, flat, blue or purplish patches in the skin. · Numbness or weakness in your arm or leg, or on one side of your body. · Pain in your lower leg (calf). · Sudden or severe headache, problems with vision, speech, or walking. · Swelling in your hands, ankles, or feet. · Uneven heartbeat. · Unusual bleeding or bruising. · Vomiting blood or material that looks like coffee grounds. · Warmth or redness in your face, neck, arms, or upper chest.  If you notice these less serious side effects, talk with your doctor:   · Confusion. · Nausea or diarrhea. · Pain, redness, bruising, swelling, or a lump under your skin where the shot was given.   If you notice other side effects that you think are caused by this medicine, tell your doctor. Call your doctor for medical advice about side effects. You may report side effects to FDA at 5-025-KAW-6076  © 2016 [address] is for End User's use only and may not be sold, redistributed or otherwise used for commercial purposes. The above information is an  only. It is not intended as medical advice for individual conditions or treatments. Talk to your doctor, nurse or pharmacist before following any medical regimen to see if it is safe and effective for you.

## 2017-03-29 NOTE — TELEPHONE ENCOUNTER
Tori/[facility]/434 [phone] calling to make sure  will be following Pt thru his skilled nursing and PT.  Thanks

## 2017-03-29 NOTE — TELEPHONE ENCOUNTER
Returned phone call to Tori -- no answer. Left detailed message on answering machine informing her that patient will need a PARKER appointment and that the nurse navigators have contacted him regarding scheduling an appointment.

## 2017-03-29 NOTE — PROGRESS NOTES
Claudia Darling 3/24-28/2017: Right upper extremity weakness , underwent C3-C7 anterior cervical diskectomy and arthrodesis on 3/13/2017 and was discharged home 3/16/2017. Patient was readmitted to [facility] and evaluation duplex in the right arm demonstrated deep and superficial venous thrombosis. Vascular was consulted. Vascular noted the patient does not have evidence of atrial ischemia in the hands. The venous thrombus is not responsible for the weakness in his arm. Recommended anticoagulation if possible for the DVT X1-2 months per Yohannes Mishra MD Vascular Surgery Fellow. Patient's wife will assist patient with Collar change. Wife and patient educated on Lovenox use and administration. Patient to followed up [facility] Neurology clinic 3/29/2017  (attended). Patient has appointment with PCP 3/31/2017. Patient is to bridge from Lovenox to Coumadin followed by PCP. Discharge summary stated patient cleared PT/OT but was scheduled home health visits for OT and for Lovenox teaching reinforcement staring on 3/30/2017 ( NN confirmed with [facility]). NN contacted [facility] Dr. Humberto De Dios. GENNY MONTENEGRO & ROSALINDA LAWS [facility] Neurosurgery to clarify Lovenox to Coumadin bridge as patient is unsure. His office will contact patient to clarify medication regimen. NN reviewed Red Flags including S/S SEPSIS and to seek immediate medical attention if:  Red Flags/SEPSIS: Fever, or below normal temperature 97 F, chills,Nausea,Vomiting,Diarrhea, unable to take medications,pain,SOB,chest pain,unusual sensations. Patient will follow up with :  MRI [facility]): 4/11/2017  Neurology: Pending   Neuro Surgery: Pending  NN available to patient and family. NN spoke with Dr. Bjorn treviño who reinforced patient's bridge with Lovenox and coumadin. NN contacted the patient and reinforced original regimen with him and his wife. Patient knows to stop aggrenox after today's dose.

## 2017-03-30 ENCOUNTER — PATIENT OUTREACH (OUTPATIENT)
Dept: FAMILY MEDICINE CLINIC | Age: 67
End: 2017-03-30

## 2017-03-30 NOTE — PROGRESS NOTES
Continuation: Riva Sicard 3/24-28/2017: Right upper extremity weakness , underwent C3-C7 anterior cervical diskectomy and arthrodesis on 3/13/2017 and was discharged home 3/16/2017. Patient was readmitted to [facility] and evaluation duplex in the right arm demonstrated deep and superficial venous thrombosis. Patient contacted NN and stated that his future appointments are:  MRI 4/10/2017  Neurology 4/18/2017  Neurosurgery 4/25/2017    Patient concerned that [address] [place] [address] Ed Mckeon was not coming early today. NN contacted [facility]ly Taylor and they stated that they will make the patient a priority for first morning visit due to Lovenox administration supervision. NN informed patient of plan.

## 2017-03-31 ENCOUNTER — OFFICE VISIT (OUTPATIENT)
Dept: FAMILY MEDICINE CLINIC | Age: 67
End: 2017-03-31

## 2017-03-31 VITALS
WEIGHT: 203 LBS | BODY MASS INDEX: 28.42 KG/M2 | TEMPERATURE: 97.6 F | HEIGHT: 71 IN | HEART RATE: 83 BPM | RESPIRATION RATE: 14 BRPM | DIASTOLIC BLOOD PRESSURE: 86 MMHG | SYSTOLIC BLOOD PRESSURE: 129 MMHG | OXYGEN SATURATION: 97 %

## 2017-03-31 DIAGNOSIS — Z92.29 HX OF LONG TERM USE OF BLOOD THINNERS: Primary | ICD-10-CM

## 2017-03-31 DIAGNOSIS — E03.4 HYPOTHYROIDISM DUE TO ACQUIRED ATROPHY OF THYROID: Chronic | ICD-10-CM

## 2017-03-31 DIAGNOSIS — Z98.1 S/P CERVICAL SPINAL FUSION: ICD-10-CM

## 2017-03-31 DIAGNOSIS — M50.30 DDD (DEGENERATIVE DISC DISEASE), CERVICAL: ICD-10-CM

## 2017-03-31 DIAGNOSIS — I10 ESSENTIAL HYPERTENSION: Chronic | ICD-10-CM

## 2017-03-31 DIAGNOSIS — R29.898 ARM DYSFUNCTION: ICD-10-CM

## 2017-03-31 LAB
INR BLD: 1.1
PT POC: 12.7 SEC
VALID INTERNAL CONTROL?: YES

## 2017-03-31 NOTE — LETTER
3/31/2017 2:57 PM 
 
Mr. Belgica Moreno [address] [place], [place] [id number] Dear Mr. Rosales Para: 
 
Tessie Jiang has been faxed to [facility], to have that office contact you to schedule the appointment. The office is located at [place] [address] with a phone number of [phone]. If you do not hear from that office, please contact them at the above number. If you have any questions, please contact our office. Sincerely, Amanda Orta

## 2017-03-31 NOTE — MR AVS SNAPSHOT
Visit Information Date & Time Provider Department Dept. Phone Encounter #  
 3/31/2017 11:30 AM Carolina Nguyen MD [address]mitt [id number] Follow-up Instructions Return in about 2 weeks (around 4/14/2017). Your Appointments 5/26/2017  8:20 AM  
ROUTINE CARE with Carolina Nguyen MD  
[facility] Appt Note: 4 mo f/u-HTN;Hyperlipidemia [address] [address]  
[place] [address] [address] Upcoming Health Maintenance Date Due ZOSTER VACCINE AGE 60> 3/18/2012 GLAUCOMA SCREENING Q2Y 3/18/2017 MEDICARE YEARLY EXAM 3/18/2017 Pneumococcal 65+ High/Highest Risk (2 of 2 - PPSV23) 9/1/2020 DTaP/Tdap/Td series (2 - Td) 11/11/2024 COLONOSCOPY 10/4/2026 Allergies as of 3/31/2017  Review Complete On: 3/31/2017 By: Abe Seymour Severity Noted Reaction Type Reactions Lipitor [Atorvastatin]  04/27/2010    Other (comments) Myalgia Simvastatin  04/27/2010    Other (comments)  
 myalgia Current Immunizations  Reviewed on 1/4/2016 Name Date Influenza Vaccine 10/23/2013 Influenza Vaccine (Quad) PF 9/21/2016, 10/16/2015 Influenza Vaccine Split 10/26/2012, 10/21/2011, 11/10/2010, 12/3/2009 Pneumococcal Conjugate (PCV-13) 11/30/2016 Pneumococcal Polysaccharide (PPSV-23) 9/1/2015 Tdap 11/11/2014 Not reviewed this visit You Were Diagnosed With   
  
 Codes Comments Hx of long term use of blood thinners    -  Primary ICD-10-CM: Z79.01 
ICD-9-CM: V58.61   
 DDD (degenerative disc disease), cervical     ICD-10-CM: M50.30 ICD-9-CM: 722.4 Arm dysfunction     ICD-10-CM: R29.898 ICD-9-CM: 729.89 S/P cervical spinal fusion     ICD-10-CM: Z98.1 ICD-9-CM: V45.4 Essential hypertension     ICD-10-CM: I10 
ICD-9-CM: 401.9 Hypothyroidism due to acquired atrophy of thyroid     ICD-10-CM: E03.4 ICD-9-CM: 244.8, 246.8 Vitals BP Pulse Temp Resp Height(growth percentile) Weight(growth percentile) 129/86 83 97.6 °F (36.4 °C) (Oral) 14 5' 11\" (1.803 m) 203 lb (92.1 kg) SpO2 BMI Smoking Status 97% 28.31 kg/m2 Former Smoker Vitals History BMI and BSA Data Body Mass Index Body Surface Area  
 28.31 kg/m 2 2.15 m 2 [facility] [address] [phone] Your Updated Medication List  
  
   
This list is accurate as of: 3/31/17 12:34 PM.  Always use your most recent med list. amLODIPine 2.5 mg tablet Commonly known as:  Love Breach TAKE ONE TABLET BY MOUTH EVERY DAY  
  
 aspirin-dipyridamole  mg per SR capsule Commonly known as:  AGGRENOX Take 1 Cap by mouth two (2) times a day. avanafil 100 mg tablet Commonly known as:  FVFNBMY Take  by mouth as needed for Other.  
  
 ezetimibe 10 mg tablet Commonly known as:  Elmira Andrade TAKE ONE TABLET BY MOUTH EVERY DAY  
  
 FISH OIL 1,000 mg Cap Generic drug:  omega-3 fatty acids-vitamin e Take 1 Cap by mouth two (2) times a day. fluticasone 50 mcg/actuation nasal spray Commonly known as:  Chica [place] 2 Sprays by Both Nostrils route daily. levothyroxine 150 mcg tablet Commonly known as:  SYNTHROID Take 1 Tab by mouth Daily (before breakfast). LOVENOX 100 mg/mL Generic drug:  enoxaparin 100 mg by SubCUTAneous route every twelve (12) hours. * MAG-OXIDE 400 mg tablet Generic drug:  magnesium oxide Take 400 mg by mouth daily. * magnesium oxide 400 mg tablet Commonly known as:  MAG-OXIDE Take 1 Tab by mouth daily. pravastatin 40 mg tablet Commonly known as:  PRAVACHOL  
TAKE ONE TABLET BY MOUTH AT NIGHT ON MONDAY, WEDNESDAY & FRIDAY, (TAKE CoQ 10 100MG TWICE DAILY IF CRAMPS OCCUR)  Indications: HYPERLIPIDEMIA Senna 8.6 mg tablet Generic drug:  senna Take 1 Tab by mouth daily. VITAMIN B-12 1,000 mcg tablet Generic drug:  cyanocobalamin Take 1,000 mcg by mouth daily. warfarin 5 mg tablet Commonly known as:  COUMADIN Take 5 mg by mouth daily. * Notice: This list has 2 medication(s) that are the same as other medications prescribed for you. Read the directions carefully, and ask your doctor or other care provider to review them with you. We Performed the Following AMB POC PT/INR [06622 CPT(R)] COLLECTION CAPILLARY BLOOD SPECIMEN [52242 CPT(R)] REFERRAL TO OCCUPATIONAL THERAPY [REF53 Custom] Comments:  
 Please evaluate patient for arm dysfunction. Progressive. Please schedule and authorize patient for services. REFERRAL TO PHYSICAL THERAPY [[id number] Custom] Comments:  
 Please evaluate patient for bilateral arm dysfunction after neck surgery. Please schedule and authorize patient for services. Progressive. Follow-up Instructions Return in about 2 weeks (around 4/14/2017). Referral Information Referral ID Referred By Referred To  
  
 [id number] Danielle Bassett Not Available Visits Status Start Date End Date 1 New Request 3/31/17 3/31/18 If your referral has a status of pending review or denied, additional information will be sent to support the outcome of this decision. Referral ID Referred By Referred To  
 [id number] Danielle Bassett Not Available Visits Status Start Date End Date 1 New Request 3/31/17 3/31/18 If your referral has a status of pending review or denied, additional information will be sent to support the outcome of this decision. Patient Instructions Referral to OT and PT done with progressive. Please provide this summary of care documentation to your next provider. Your primary care clinician is listed as Πάνου 90. If you have any questions after today's visit, please call [phone].

## 2017-03-31 NOTE — PROGRESS NOTES
Reviewed record in preparation for visit and have necessary documentation      Body mass index is 28.31 kg/(m^2).     Health Maintenance Due   Topic Date Due    ZOSTER VACCINE AGE 60>  03/18/2012    GLAUCOMA SCREENING Q2Y  03/18/2017    MEDICARE YEARLY EXAM  03/18/2017

## 2017-04-01 NOTE — PROGRESS NOTES
Transition of Care Visit    Patient: Misha Craig MRN: [id number]  SSN: [id number]    YOB: 1950  Age: 74 y.o. Sex: male      [facility]  Dates of admission:3/15-17 and repeat admission this 3/24-28/17. Discharge diagnoses:cervical impingement C3-7, superficial thrombophebitis in right arm with right bracial plexitis. State of health at discharge:stable  Surgical or invasive procedures done:cervical, decompression and fixation. Amount and/or Complexity of Data Reviewed:   Clinical lab tests: Reviewed or ordered   Tests in the radiology section: reviewed or ordered  Discussion of test results with the patient-yes  Obtain previous medical records or obtain history from someone other than the patient: No  Obtain history from someone other than the patient: no  Review and address past medical records: yes  Discuss the patient with another provider: no  Independant visualization of image, tracing, or specimen: yes     Risk of Significant Complications, Morbidity, and/or Mortality:   Presenting problems: High   Diagnostic procedures: Moderate   Management options: high     Transition of Care time spent:   Total time providing care and documentation: 40-60 minutes   Progress at discharge:   Stable on Discharge      Progress Note    Patient: Misha Craig MRN: [id number]  SSN: [id number]    YOB: 1950  Age: 74 y.o. Sex: male        Chief Complaint   Patient presents with   [facility] Follow Up         Subjective:     Encounter Diagnoses   Name Primary?  Hx of long term use of blood thinners: on lovenox 90 mg bid until coumadin INR therapeutic.this is for superficial thrombophlebitis. He has had only 1 dose of coumadin. Will check to see if MD inr can work with him to keep him off the road. Yes    DDD (degenerative disc disease), cervical: loss of use of left arm and now loss function of right arm. Wife has to do everything for him.          Arm dysfunction: now bilateral. Unusual problem. Very debilitating. Will follow up with Dr. Melina Pak at [facility] S/P cervical spinal fusion: surgical aspect seems to have gone well. Rigid brace on neck. He wants OP OT and PT, sent to [facility]. Did not want to be housebound.  Essential hypertension:  BP Readings from Last 3 Encounters:   03/31/17 129/86   01/25/17 145/84   01/06/17 142/86     The patient reports:  taking medications as instructed, no medication side effects noted, no TIA's, no chest pain on exertion, no dyspnea on exertion, no swelling of ankles. Lab Results   Component Value Date/Time    Sodium 141 01/25/2017 09:19 AM    Potassium 4.4 01/25/2017 09:19 AM    Chloride 103 01/25/2017 09:19 AM    CO2 23 01/25/2017 09:19 AM    Anion gap 6 05/24/2011 02:28 PM    Glucose 92 01/25/2017 09:19 AM    BUN 13 01/25/2017 09:19 AM    Creatinine 0.78 01/25/2017 09:19 AM    BUN/Creatinine ratio 17 01/25/2017 09:19 AM    GFR est  01/25/2017 09:19 AM    GFR est non-AA 95 01/25/2017 09:19 AM    Calcium 9.9 01/25/2017 09:19 AM    Bilirubin, total 0.4 01/25/2017 09:19 AM    AST (SGOT) 23 01/25/2017 09:19 AM    Alk. phosphatase 65 01/25/2017 09:19 AM    Protein, total 7.5 01/25/2017 09:19 AM    Albumin 4.3 01/25/2017 09:19 AM    Globulin 3.9 05/24/2011 02:28 PM    A-G Ratio 1.3 01/25/2017 09:19 AM    ALT (SGPT) 17 01/25/2017 09:19 AM     Our goal is to normalize the blood pressure to decrease the risks of strokes and heart attacks. The patient is in agreement with the plan.  Hypothyroidism due to acquired atrophy of thyroid:  Lab Results   Component Value Date/Time    TSH 0.906 01/25/2017 09:19 AM    T4, Free 1.28 01/25/2017 09:19 AM    T4, Total 9.6 11/10/2010 01:21 AM      Denies fatigue, nervousness,weight changes, heat orcold intolerance, bowel changes,skin changes, cardiovascular symptoms, hair loss, feeling excessive energy, tremor, palpitations and weight loss.   Thyroid medication has been unchanged since last medication check and labs. Current and past medical information:    Current Medications after this visit[de-identified]   Current Outpatient Prescriptions   Medication Sig    enoxaparin (LOVENOX) 100 mg/mL 100 mg by SubCUTAneous route every twelve (12) hours.  warfarin (COUMADIN) 5 mg tablet Take 5 mg by mouth daily.  senna (SENNA) 8.6 mg tablet Take 1 Tab by mouth daily.  amLODIPine (NORVASC) 2.5 mg tablet TAKE ONE TABLET BY MOUTH EVERY DAY    cyanocobalamin (VITAMIN B-12) 1,000 mcg tablet Take 1,000 mcg by mouth daily.  levothyroxine (SYNTHROID) 150 mcg tablet Take 1 Tab by mouth Daily (before breakfast).  pravastatin (PRAVACHOL) 40 mg tablet TAKE ONE TABLET BY MOUTH AT NIGHT ON MONDAY, WEDNESDAY & FRIDAY, (TAKE CoQ 10 100MG TWICE DAILY IF CRAMPS OCCUR)  Indications: HYPERLIPIDEMIA    ezetimibe (ZETIA) 10 mg tablet TAKE ONE TABLET BY MOUTH EVERY DAY    fluticasone (FLONASE) 50 mcg/actuation nasal spray 2 Sprays by Both Nostrils route daily.  avanafil (STENDRA) 100 mg tablet Take  by mouth as needed for Other.  omega-3 fatty acids-vitamin e (FISH OIL) 1,000 mg Cap Take 1 Cap by mouth two (2) times a day.  magnesium oxide (MAG-OXIDE) 400 mg tablet Take 400 mg by mouth daily.  aspirin-dipyridamole (AGGRENOX)  mg per SR capsule Take 1 Cap by mouth two (2) times a day.  [CANCELED] magnesium oxide (MAG-OXIDE) 400 mg tablet Take 1 Tab by mouth daily. No current facility-administered medications for this visit.         Patient Active Problem List    Diagnosis Date Noted    Mixed hyperlipidemia 04/27/2010     Priority: 1 - One    Benign neoplasm of colon 04/27/2010     Priority: 1 - One    Tobacco use disorder 04/27/2010     Priority: 1 - One    Keloid 04/27/2010     Priority: 1 - One    Internal hemorrhoid 04/27/2010     Priority: 1 - One    Thyroglossal duct cyst 09/20/2010     Priority: 3 - Three    History of CVA (cerebrovascular accident) 05/22/2015    HTN (hypertension) 11/11/2014    Prostate cancer ([facility] 04/17/2011    Hypothyroidism 09/25/2010    Boil 09/20/2010       Past Medical History:   Diagnosis Date    Benign neoplasm of colon 4/27/2010    Cancer ([facility]     PROSTATE    HTN (hypertension) 11/11/2014    Hypercholesterolemia     Internal hemorrhoid 4/27/2010    Keloid 4/27/2010    Mixed hyperlipidemia 4/27/2010    Neck mass 4/27/2010    Other ill-defined conditions(799.89)     HI CHOLESTEROL    Prostate cancer ([facility] 4/17/2011    Smoker     Stroke ([facility]     Per pt 2014    Thyroid disease     HYPO    Tobacco use disorder 4/27/2010       Allergies   Allergen Reactions    Lipitor [Atorvastatin] Other (comments)     Myalgia      Simvastatin Other (comments)     myalgia         Past Surgical History:   Procedure Laterality Date    HX GI      COLONOSCOPY    HX OTHER SURGICAL      KELOIDS--BACK    HX PROSTATECTOMY      PROSTATE BIOPSY    HX SPLENECTOMY  1969    adhesions----1997       Social History     Social History    Marital status:      Spouse name: N/A    Number of children: N/A    Years of education: N/A     Social History Main Topics    Smoking status: Former Smoker     Packs/day: 0.25     Years: 40.00     Types: Cigarettes     Quit date: 2/9/2015    Smokeless tobacco: Never Used      Comment: smokes 1/2 pack week    Alcohol use 3.0 oz/week     6 Cans of beer per week      Comment: moderately    Drug use: No    Sexual activity: Yes     Other Topics Concern    None     Social History Narrative       Review of Systems   Constitutional: Negative. Negative for fever, chills, weight loss, malaise/fatigue and diaphoresis. HENT: Negative. Negative for hearing loss, ear pain, nosebleeds, congestion, sore throat, neck pain, tinnitus and ear discharge. Eyes: Negative. Negative for blurred vision, double vision, photophobia, pain, discharge and redness. Respiratory: Negative.   Negative for cough, hemoptysis, sputum production, shortness of breath, or wheezing. Cardiovascular: Negative. Negative for chest pain, palpitations. Gastrointestinal: Negative. Negative for heartburn, nausea, vomiting, abdominal pain, diarrhea, constipation, blood in stool and melena. Genitourinary: Negative. Negative for dysuria, urgency, frequency, hematuria. Musculoskeletal: weakness raising arms. Skin: Negative. Negative for rash. Neurological: arm dysfuction bilateral. Hard to feed himself. Endo/Heme/Allergies: Negative. Negative for environmental allergies and polydipsia. Does not bruise/bleed easily. Psychiatric/Behavioral: Negative. Negative for depression, suicidal ideas, hallucinations, memory loss. Objective:     Vitals:    03/31/17 1145   BP: 129/86   Pulse: 83   Resp: 14   Temp: 97.6 °F (36.4 °C)   TempSrc: Oral   SpO2: 97%   Weight: 203 lb (92.1 kg)   Height: 5' 11\" (1.803 m)      Body mass index is 28.31 kg/(m^2). Physical Exam   Nursing note reviewed. Constitutional: Oriented to person, place, and time. Appears well-developed and well-nourished. No distress. HENT: Oropharynx normal, no adenopathy. Head: Normocephalic and atraumatic. Eyes: Conjunctivae are normal. Pupils are equal, round. No scleral icterus. Neck: Normal range of motion. Neck supple. No JVD present. No tracheal deviation present. No thyromegaly present. No carotid bruit. Cardiovascular: Normal rate, regular rhythm and normal heart sounds. Exam reveals no gallop and no friction rub. No murmur heard. Pulmonary/Chest: Effort normal and breath sounds normal. Has no wheezes. Has no rales. Abdominal: Soft. Bowel sounds are normal. No distension. There is no tenderness. There is no rebound and no guarding. Musculoskeletal: Exhibits no edema. Neurological: The patient is alert and oriented to person, place, and time. No tremor. Severe arm dysfunction. Hard to explain right side.  Left side dysfunction has been going on since November  Skin: Skin is warm and dry. No rash noted. Not diaphoretic. Psychiatric:  Has a normal mood and affect. Behavior is normal. Judgment and thought content normal      Health Maintenance Due   Topic Date Due    ZOSTER VACCINE AGE 60>  03/18/2012    GLAUCOMA SCREENING Q2Y  03/18/2017    MEDICARE YEARLY EXAM  03/18/2017       Assessment and orders:       ICD-10-CM ICD-9-CM    1. Hx of long term use of blood thinners- Continue coumadin 5 mg daily. lovenox 90 mg BID, INR Wednesday. Z79.01 V58.61 AMB POC PT/INR      COLLECTION CAPILLARY BLOOD SPECIMEN      REFERRAL TO PHYSICAL THERAPY      REFERRAL TO OCCUPATIONAL THERAPY   2. DDD (degenerative disc disease), cervical   M50.30 722.4    3. Arm dysfunction, bilateral   R29.898 729.89    4. S/P cervical spinal fusion   Z98.1 V45.4    5. Essential hypertension- controlled   I10 401.9    6. Hypothyroidism due to acquired atrophy of thyroid- no sx. E03.4 244.8      246.8          Plan of care:  Discussed diagnoses in detail with patient. Medication risks/benefits/side effects discussed with patient. All of the patient's questions were addressed. The patient understands and agrees with our plan of care. The patient knows to call back if they are unsure of or forget any changes we discussed today or if the symptoms change. The patient received an After-Visit Summary which contains VS, orders, medication list and allergy list. This can be used as a \"mini-medical record\" should they have to seek medical care while out of town. Follow-up Disposition:  Return in about 2 weeks (around 4/14/2017).     Future Appointments  Date Time Provider Rocky Karimi   4/5/2017 2:00 PM Deborah Silva MD [facility] SCHED   5/26/2017 8:20 AM Deborah Silva MD [facility] SCHED       Signed By: Deborah Silva MD     March 31, 2017

## 2017-04-04 ENCOUNTER — TELEPHONE (OUTPATIENT)
Dept: FAMILY MEDICINE CLINIC | Age: 67
End: 2017-04-04

## 2017-04-04 NOTE — TELEPHONE ENCOUNTER
Pt has enough needles for Wednesday  but not enough for Friday for the Luvenox. Please call Pt. He had to change the appointment because of provider cancellation. Note: No need to further Note. Pt is coming in on Wednesday a.m. And will address everything.  thanks

## 2017-04-05 ENCOUNTER — CLINICAL SUPPORT (OUTPATIENT)
Dept: FAMILY MEDICINE CLINIC | Age: 67
End: 2017-04-05

## 2017-04-05 VITALS
OXYGEN SATURATION: 96 % | HEART RATE: 75 BPM | TEMPERATURE: 98.6 F | DIASTOLIC BLOOD PRESSURE: 73 MMHG | HEIGHT: 71 IN | BODY MASS INDEX: 28.45 KG/M2 | WEIGHT: 203.2 LBS | RESPIRATION RATE: 16 BRPM | SYSTOLIC BLOOD PRESSURE: 122 MMHG

## 2017-04-05 DIAGNOSIS — M54.12 CERVICOBRACHIAL NEURALGIA: ICD-10-CM

## 2017-04-05 DIAGNOSIS — Z79.01 CHRONIC ANTICOAGULATION: ICD-10-CM

## 2017-04-05 DIAGNOSIS — R29.898 ARM DYSFUNCTION: Primary | ICD-10-CM

## 2017-04-05 LAB
INR BLD: 2.3
PT POC: 28.1 SEC
VALID INTERNAL CONTROL?: YES

## 2017-04-05 NOTE — PROGRESS NOTES
Chief Complaint   Patient presents with    Anticoagulation     INR Check      Body mass index is 28.34 kg/(m^2).     Reviewed record in preparation for visit and have necessary documentation  Pt did not bring medication to office visit for review  Information was given to pt on Advanced Directives, Living Will  Information was given on Shingles Vaccine  Opportunity was given for questions  Goals that were addressed and/or need to be completed after this appointment include:     Health Maintenance Due   Topic Date Due    ZOSTER VACCINE AGE 60>  03/18/2012    GLAUCOMA SCREENING Q2Y  03/18/2017    MEDICARE YEARLY EXAM  03/18/2017

## 2017-04-05 NOTE — MR AVS SNAPSHOT
Visit Information Date & Time Provider Department Dept. Phone Encounter #  
 4/5/2017  8:00 AM Keiry Bowles MD [address] [place], [place] [address]kiki [place] [id number] Follow-up Instructions Return in about 2 days (around 4/7/2017) for inr. Your Appointments 5/26/2017  8:20 AM  
ROUTINE CARE with Keiry Bowles MD  
[facility] Appt Note: 4 mo f/u-HTN;Hyperlipidemia [address] [address]  
[place] [address] [address] Upcoming Health Maintenance Date Due ZOSTER VACCINE AGE 60> 3/18/2012 GLAUCOMA SCREENING Q2Y 3/18/2017 MEDICARE YEARLY EXAM 3/18/2017 Pneumococcal 65+ High/Highest Risk (2 of 2 - PPSV23) 9/1/2020 DTaP/Tdap/Td series (2 - Td) 11/11/2024 COLONOSCOPY 10/4/2026 Allergies as of 4/5/2017  Review Complete On: 4/5/2017 By: Bret Cummins LPN Severity Noted Reaction Type Reactions Lipitor [Atorvastatin]  04/27/2010    Other (comments) Myalgia Simvastatin  04/27/2010    Other (comments)  
 myalgia Current Immunizations  Reviewed on 1/4/2016 Name Date Influenza Vaccine 10/23/2013 Influenza Vaccine (Quad) PF 9/21/2016, 10/16/2015 Influenza Vaccine Split 10/26/2012, 10/21/2011, 11/10/2010, 12/3/2009 Pneumococcal Conjugate (PCV-13) 11/30/2016 Pneumococcal Polysaccharide (PPSV-23) 9/1/2015 Tdap 11/11/2014 Not reviewed this visit You Were Diagnosed With   
  
 Codes Comments Arm dysfunction    -  Primary ICD-10-CM: R29.898 ICD-9-CM: 729.89 Cervicobrachial neuralgia     ICD-10-CM: M54.12 
ICD-9-CM: 723.4 Vitals BP Pulse Temp Resp Height(growth percentile) Weight(growth percentile) 122/73 (BP 1 Location: Left arm, BP Patient Position: Sitting) 75 98.6 °F (37 °C) (Oral) 16 5' 11\" (1.803 m) 203 lb 3.2 oz (92.2 kg) SpO2 BMI Smoking Status 96% 28.34 kg/m2 Former Smoker Vitals History BMI and BSA Data Body Mass Index Body Surface Area  
 28.34 kg/m 2 2.15 m 2 Preferred Pharmacy Pharmacy Name Phone [address] [place], [place] - [address] [facility] [phone] Your Updated Medication List  
  
   
This list is accurate as of: 4/5/17  8:23 AM.  Always use your most recent med list. amLODIPine 2.5 mg tablet Commonly known as:  Bernice Fast TAKE ONE TABLET BY MOUTH EVERY DAY  
  
 aspirin-dipyridamole  mg per SR capsule Commonly known as:  AGGRENOX Take 1 Cap by mouth two (2) times a day. avanafil 100 mg tablet Commonly known as:  NAAESLG Take  by mouth as needed for Other.  
  
 ezetimibe 10 mg tablet Commonly known as:  Pecola Gould TAKE ONE TABLET BY MOUTH EVERY DAY  
  
 FISH OIL 1,000 mg Cap Generic drug:  omega-3 fatty acids-vitamin e Take 1 Cap by mouth two (2) times a day. fluticasone 50 mcg/actuation nasal spray Commonly known as:  Pillo Marrow 2 Sprays by Both Nostrils route daily. levothyroxine 150 mcg tablet Commonly known as:  SYNTHROID Take 1 Tab by mouth Daily (before breakfast). LOVENOX 100 mg/mL Generic drug:  enoxaparin 100 mg by SubCUTAneous route every twelve (12) hours. * MAG-OXIDE 400 mg tablet Generic drug:  magnesium oxide Take 400 mg by mouth daily. * magnesium oxide 400 mg tablet Commonly known as:  MAG-OXIDE Take 1 Tab by mouth daily. pravastatin 40 mg tablet Commonly known as:  PRAVACHOL  
TAKE ONE TABLET BY MOUTH AT NIGHT ON MONDAY, WEDNESDAY & FRIDAY, (TAKE CoQ 10 100MG TWICE DAILY IF CRAMPS OCCUR)  Indications: HYPERLIPIDEMIA Senna 8.6 mg tablet Generic drug:  senna Take 1 Tab by mouth daily. VITAMIN B-12 1,000 mcg tablet Generic drug:  cyanocobalamin Take 1,000 mcg by mouth daily. warfarin 5 mg tablet Commonly known as:  COUMADIN  
 Take 5 mg by mouth daily. * Notice: This list has 2 medication(s) that are the same as other medications prescribed for you. Read the directions carefully, and ask your doctor or other care provider to review them with you. Follow-up Instructions Return in about 2 days (around 4/7/2017) for inr. Patient Instructions Prothrombin Time: About This Test 
What is it? Prothrombin time (PT) is a blood test that measures how long it takes for blood to clot. Prothrombin is one of several clotting factors your body makes to help your blood form clots when a blood vessel is damaged. A PT test may also be called an INR test (for international normalized ratio). Why is this test done? The test can be used to check for bleeding problems. It is also used to check how well warfarin, a medicine to prevent blood clots, is working. How can you prepare for the test? 
· In general, you don't need to prepare before having this test. Your doctor may give you some specific instructions. What happens during the test? 
· A health professional takes a sample of your blood. What else should you know about the test? 
· Your results will include an explanation of what a \"normal\" result is. This is called a \"reference range. \" It is just a guide. Your doctor will evaluate your results based on your health and other factors. This means that a value that falls outside the normal values listed may still be normal for you. · If you're taking warfarin, regular testing helps your doctor make sure you're taking the right amount. How long does the test take? · The test will take a few minutes. What happens after the test? 
· You will probably be able to go home right away. · You can go back to your usual activities right away. Follow-up care is a key part of your treatment and safety.  Be sure to make and go to all appointments, and call your doctor if you are having problems. It's also a good idea to keep a list of the medicines you take. Ask your doctor when you can expect to have your test results. Where can you learn more? Go to http://rosa-becky.info/. Enter A053 in the search box to learn more about \"Prothrombin Time: About This Test.\" Current as of: July 28, 2016 Content Version: 11.2 © 0718-2302 [employer]. Care instructions adapted under license by [employer] (which disclaims liability or warranty for this information). If you have questions about a medical condition or this instruction, always ask your healthcare professional. [address] any warranty or liability for your use of this information. Please provide this summary of care documentation to your next provider. Your primary care clinician is listed as [address]. If you have any questions after today's visit, please call [phone].

## 2017-04-05 NOTE — PATIENT INSTRUCTIONS
Prothrombin Time: About This Test  What is it? Prothrombin time (PT) is a blood test that measures how long it takes for blood to clot. Prothrombin is one of several clotting factors your body makes to help your blood form clots when a blood vessel is damaged. A PT test may also be called an INR test (for international normalized ratio). Why is this test done? The test can be used to check for bleeding problems. It is also used to check how well warfarin, a medicine to prevent blood clots, is working. How can you prepare for the test?  · In general, you don't need to prepare before having this test. Your doctor may give you some specific instructions. What happens during the test?  · A health professional takes a sample of your blood. What else should you know about the test?  · Your results will include an explanation of what a \"normal\" result is. This is called a \"reference range. \" It is just a guide. Your doctor will evaluate your results based on your health and other factors. This means that a value that falls outside the normal values listed may still be normal for you. · If you're taking warfarin, regular testing helps your doctor make sure you're taking the right amount. How long does the test take? · The test will take a few minutes. What happens after the test?  · You will probably be able to go home right away. · You can go back to your usual activities right away. Follow-up care is a key part of your treatment and safety. Be sure to make and go to all appointments, and call your doctor if you are having problems. It's also a good idea to keep a list of the medicines you take. Ask your doctor when you can expect to have your test results. Where can you learn more? Go to http://rosa-becky.info/.   Enter T629 in the search box to learn more about \"Prothrombin Time: About This Test.\"  Current as of: July 28, 2016  Content Version: 11.2  © 5486-3554 [employer]. Care instructions adapted under license by [employer] (which disclaims liability or warranty for this information). If you have questions about a medical condition or this instruction, always ask your healthcare professional. [address] any warranty or liability for your use of this information.

## 2017-04-06 NOTE — PROGRESS NOTES
Progress Note    Patient: Yamileth Ledezma MRN: [id number]  SSN: [id number]    YOB: 1950  Age: 74 y.o. Sex: male        Chief Complaint   Patient presents with    Anticoagulation     INR Check          Subjective:     Encounter Diagnoses   Name Primary?  Arm dysfunction: bilateral nerve defects. Left is cervical disc related , right was diagnosed as right brachial plexus related. He had superficial thrombophlebitis in the right arm. Yes    Cervicobrachial neuralgia: above.  Chronic anticoagulation:  Anticoagulation:   Lab Results   Component Value Date/Time    INR 1.0 05/24/2011 02:28 PM    INR POC 2.3 04/05/2017 08:32 AM    INR POC 1.1 03/31/2017 11:54 AM    Prothrombin time 10.6 05/24/2011 02:28 PM     Current dose of coumadin:  Patient here for followup of chronic anticoagulation. Indication: thrombophlebitis. .  Bleeding Signs/Symptoms:  None. INR's are drawn regularly and recently have been Subtherapeutic. INR today is  2.3. Plan: Coumadin dosing-   no change   Recheck INR in 2 day(s)    Wife can stop the lovenox injections. Current and past medical information:    Current Medications after this visit[de-identified]   Current Outpatient Prescriptions   Medication Sig    enoxaparin (LOVENOX) 100 mg/mL 100 mg by SubCUTAneous route every twelve (12) hours.  warfarin (COUMADIN) 5 mg tablet Take 5 mg by mouth daily.  senna (SENNA) 8.6 mg tablet Take 1 Tab by mouth daily.  amLODIPine (NORVASC) 2.5 mg tablet TAKE ONE TABLET BY MOUTH EVERY DAY    cyanocobalamin (VITAMIN B-12) 1,000 mcg tablet Take 1,000 mcg by mouth daily.  levothyroxine (SYNTHROID) 150 mcg tablet Take 1 Tab by mouth Daily (before breakfast).     pravastatin (PRAVACHOL) 40 mg tablet TAKE ONE TABLET BY MOUTH AT NIGHT ON MONDAY, WEDNESDAY & FRIDAY, (TAKE CoQ 10 100MG TWICE DAILY IF CRAMPS OCCUR)  Indications: HYPERLIPIDEMIA    ezetimibe (ZETIA) 10 mg tablet TAKE ONE TABLET BY MOUTH EVERY DAY    fluticasone (FLONASE) 50 mcg/actuation nasal spray 2 Sprays by Both Nostrils route daily.  avanafil (STENDRA) 100 mg tablet Take  by mouth as needed for Other.  omega-3 fatty acids-vitamin e (FISH OIL) 1,000 mg Cap Take 1 Cap by mouth two (2) times a day.  magnesium oxide (MAG-OXIDE) 400 mg tablet Take 400 mg by mouth daily.  aspirin-dipyridamole (AGGRENOX)  mg per SR capsule Take 1 Cap by mouth two (2) times a day.  [CANCELED] magnesium oxide (MAG-OXIDE) 400 mg tablet Take 1 Tab by mouth daily. No current facility-administered medications for this visit.         Patient Active Problem List    Diagnosis Date Noted    Mixed hyperlipidemia 04/27/2010     Priority: 1 - One    Benign neoplasm of colon 04/27/2010     Priority: 1 - One    Tobacco use disorder 04/27/2010     Priority: 1 - One    Keloid 04/27/2010     Priority: 1 - One    Internal hemorrhoid 04/27/2010     Priority: 1 - One    Thyroglossal duct cyst 09/20/2010     Priority: 3 - Three    History of CVA (cerebrovascular accident) 05/22/2015    HTN (hypertension) 11/11/2014    Prostate cancer ([facility] 04/17/2011    Hypothyroidism 09/25/2010    Boil 09/20/2010       Past Medical History:   Diagnosis Date    Benign neoplasm of colon 4/27/2010    Cancer ([facility]     PROSTATE    HTN (hypertension) 11/11/2014    Hypercholesterolemia     Internal hemorrhoid 4/27/2010    Keloid 4/27/2010    Mixed hyperlipidemia 4/27/2010    Neck mass 4/27/2010    Other ill-defined conditions     HI CHOLESTEROL    Prostate cancer ([facility] 4/17/2011    Smoker     Stroke [facility])     Per pt 2014    Thyroid disease     HYPO    Tobacco use disorder 4/27/2010       Allergies   Allergen Reactions    Lipitor [Atorvastatin] Other (comments)     Myalgia      Simvastatin Other (comments)     myalgia         Past Surgical History:   Procedure Laterality Date    HX GI      COLONOSCOPY    HX OTHER SURGICAL      KELOIDS--BACK    HX PROSTATECTOMY      PROSTATE BIOPSY [place] Rivera SPLENECTOMY  1969    adhesions----1997       Social History     Social History    Marital status:      Spouse name: N/A    Number of children: N/A    Years of education: N/A     Social History Main Topics    Smoking status: Former Smoker     Packs/day: 0.25     Years: 40.00     Types: Cigarettes     Quit date: 2/9/2015    Smokeless tobacco: Never Used      Comment: smokes 1/2 pack week    Alcohol use 3.0 oz/week     6 Cans of beer per week      Comment: moderately    Drug use: No    Sexual activity: Yes     Other Topics Concern    None     Social History Narrative       Review of Systems   Constitutional: Negative. Negative for chills, fever, malaise/fatigue and weight loss. HENT: Negative. Negative for hearing loss. Eyes: Negative. Negative for blurred vision and double vision. Respiratory: Negative. Negative for cough, hemoptysis, sputum production and shortness of breath. Cardiovascular: Negative. Negative for chest pain, palpitations and orthopnea. Gastrointestinal: Negative. Negative for abdominal pain, blood in stool, heartburn, nausea and vomiting. Genitourinary: Negative. Negative for dysuria, frequency and urgency. Musculoskeletal: Negative. Negative for back pain, myalgias and neck pain. No pain in arms. Just disuse. Skin: Negative. Negative for rash. Neurological: Negative. Negative for dizziness, tingling, tremors, weakness and headaches. Endo/Heme/Allergies: Negative. Psychiatric/Behavioral: Negative. Negative for depression. Objective:     Vitals:    04/05/17 0807   BP: 122/73   Pulse: 75   Resp: 16   Temp: 98.6 °F (37 °C)   TempSrc: Oral   SpO2: 96%   Weight: 203 lb 3.2 oz (92.2 kg)   Height: 5' 11\" (1.803 m)      Body mass index is 28.34 kg/(m^2). Physical Exam   Constitutional: He is oriented to person, place, and time and well-developed, well-nourished, and in no distress. HENT:   Head: Normocephalic and atraumatic. Mouth/Throat: Oropharynx is clear and moist.   Eyes: Right eye exhibits no discharge. Left eye exhibits no discharge. No scleral icterus. Neck: No tracheal deviation present. No thyromegaly present. No bruit. Cardiovascular: Normal rate, regular rhythm and normal heart sounds. Pulmonary/Chest: Effort normal and breath sounds normal.   Abdominal: Soft. Musculoskeletal:   Unable to raise either arm. Neurological: He is alert and oriented to person, place, and time. Skin: No rash noted. No erythema. Psychiatric: Mood and affect normal.   Nursing note and vitals reviewed. Health Maintenance Due   Topic Date Due    ZOSTER VACCINE AGE 60>  03/18/2012    GLAUCOMA SCREENING Q2Y  03/18/2017    MEDICARE YEARLY EXAM  03/18/2017         Assessment and orders:       ICD-10-CM ICD-9-CM    1. Arm dysfunction- no improvement   R29.898 729.89    2. Cervicobrachial neuralgia M54.12 723.4 AMB POC PT/INR      COLLECTION CAPILLARY BLOOD SPECIMEN   3. Chronic anticoagulation- told to stay on anticoagulation 1-2 months. I want definitive answer when they see Neurology. Z79.01 V58.61 Therapeutic so he strop lovenox. INR in 2 days to make sure he has peaked after 7 doses coumadin. Plan of care:  Discussed diagnoses in detail with patient. Medication risks/benefits/side effects discussed with patient. All of the patient's questions were addressed. The patient understands and agrees with our plan of care. The patient knows to call back if they are unsure of or forget any changes we discussed today or if the symptoms change. The patient received an After-Visit Summary which contains VS, orders, medication list and allergy list. This can be used as a \"mini-medical record\" should they have to seek medical care while out of town.     Patient Care Team:  Emoyr Castillo MD as PCP - General (Family Practice)  Trace Kyle MD (Orthopedic Surgery)  Edita Cantu MD as Physician (Gastroenterology)  Adri Louise, RN as Ambulatory Care Navigator    Follow-up Disposition:  Return in about 2 days (around 4/7/2017) for inr.     Future Appointments  Date Time Provider Department Center   4/7/2017 8:00 AM Michael Heller MD [facility] [place] SCH[facility]   5/26/2017 8:20 AM Michael Heller MD [facility] [place] [facility]       Signed By: Michael Heller MD     April 5, 2017

## 2017-04-07 ENCOUNTER — CLINICAL SUPPORT (OUTPATIENT)
Dept: FAMILY MEDICINE CLINIC | Age: 67
End: 2017-04-07

## 2017-04-07 ENCOUNTER — TELEPHONE (OUTPATIENT)
Dept: FAMILY MEDICINE CLINIC | Age: 67
End: 2017-04-07

## 2017-04-07 VITALS
HEIGHT: 71 IN | HEART RATE: 73 BPM | DIASTOLIC BLOOD PRESSURE: 84 MMHG | TEMPERATURE: 97.8 F | WEIGHT: 205.6 LBS | OXYGEN SATURATION: 94 % | RESPIRATION RATE: 20 BRPM | BODY MASS INDEX: 28.78 KG/M2 | SYSTOLIC BLOOD PRESSURE: 131 MMHG

## 2017-04-07 DIAGNOSIS — Z79.01 CHRONIC ANTICOAGULATION: ICD-10-CM

## 2017-04-07 DIAGNOSIS — Z98.890 STATUS POST SURGERY: Primary | ICD-10-CM

## 2017-04-07 DIAGNOSIS — I82.611 SUPERFICIAL VENOUS THROMBOSIS OF RIGHT ARM: ICD-10-CM

## 2017-04-07 LAB
INR BLD: 2.9
PT POC: 34.8 SEC
VALID INTERNAL CONTROL?: YES

## 2017-04-07 RX ORDER — WARFARIN 4 MG/1
TABLET ORAL
Qty: 30 TAB | Refills: 1 | Status: SHIPPED | OUTPATIENT
Start: 2017-04-07 | End: 2017-05-10 | Stop reason: SDUPTHER

## 2017-04-07 NOTE — MR AVS SNAPSHOT
Visit Information Date & Time Provider Department Dept. Phone Encounter #  
 4/7/2017  8:00 AM Shane Bey MD [address] [place] [address] [id number] Follow-up Instructions Return in about 1 week (around 4/14/2017). Your Appointments 5/26/2017  8:20 AM  
ROUTINE CARE with Shane Bey MD  
[address]) Appt Note: 4 mo f/u-HTN;Hyperlipidemia [address] [address]  
[place] [address] [address] Upcoming Health Maintenance Date Due ZOSTER VACCINE AGE 60> 3/18/2012 GLAUCOMA SCREENING Q2Y 3/18/2017 MEDICARE YEARLY EXAM 3/18/2017 Pneumococcal 65+ High/Highest Risk (2 of 2 - PPSV23) 9/1/2020 DTaP/Tdap/Td series (2 - Td) 11/11/2024 COLONOSCOPY 10/4/2026 Allergies as of 4/7/2017  Review Complete On: 4/7/2017 By: Ceci See Severity Noted Reaction Type Reactions Lipitor [Atorvastatin]  04/27/2010    Other (comments) Myalgia Simvastatin  04/27/2010    Other (comments)  
 myalgia Current Immunizations  Reviewed on 1/4/2016 Name Date Influenza Vaccine 10/23/2013 Influenza Vaccine (Quad) PF 9/21/2016, 10/16/2015 Influenza Vaccine Split 10/26/2012, 10/21/2011, 11/10/2010, 12/3/2009 Pneumococcal Conjugate (PCV-13) 11/30/2016 Pneumococcal Polysaccharide (PPSV-23) 9/1/2015 Tdap 11/11/2014 Not reviewed this visit You Were Diagnosed With   
  
 Codes Comments Status post surgery    -  Primary ICD-10-CM: Q04.833 ICD-9-CM: V45.89 Superficial venous thrombosis of right arm     ICD-10-CM: I82.611 ICD-9-CM: 453.81 Chronic anticoagulation     ICD-10-CM: Z79.01 
ICD-9-CM: V58.61 Vitals BP Pulse Temp Resp Height(growth percentile) Weight(growth percentile) 131/84 73 97.8 °F (36.6 °C) (Oral) 20 5' 11\" (1.803 m) 205 lb 9.6 oz (93.3 kg) SpO2 BMI Smoking Status 94% 28.68 kg/m2 Former Smoker Vitals History BMI and BSA Data Body Mass Index Body Surface Area  
 28.68 kg/m 2 2.16 m 2 Preferred Pharmacy Pharmacy Name Phone [address] [place], [place] - [address]  [phone] Your Updated Medication List  
  
   
This list is accurate as of: 4/7/17  8:25 AM.  Always use your most recent med list. amLODIPine 2.5 mg tablet Commonly known as:  [employer] TAKE ONE TABLET BY MOUTH EVERY DAY  
  
 aspirin-dipyridamole  mg per SR capsule Commonly known as:  AGGRENOX Take 1 Cap by mouth two (2) times a day. avanafil 100 mg tablet Commonly known as:  WNKUTXJ Take  by mouth as needed for Other.  
  
 ezetimibe 10 mg tablet Commonly known as:  [employer] [place] TAKE ONE TABLET BY MOUTH EVERY DAY  
  
 FISH OIL 1,000 mg Cap Generic drug:  omega-3 fatty acids-vitamin e Take 1 Cap by mouth two (2) times a day. fluticasone 50 mcg/actuation nasal spray Commonly known as:  A[employer] 2 Sprays by Both Nostrils route daily. levothyroxine 150 mcg tablet Commonly known as:  SYNTHROID Take 1 Tab by mouth Daily (before breakfast). * MAG-OXIDE 400 mg tablet Generic drug:  magnesium oxide Take 400 mg by mouth daily. * magnesium oxide 400 mg tablet Commonly known as:  MAG-OXIDE Take 1 Tab by mouth daily. pravastatin 40 mg tablet Commonly known as:  PRAVACHOL  
TAKE ONE TABLET BY MOUTH AT NIGHT ON MONDAY, WEDNESDAY & FRIDAY, (TAKE CoQ 10 100MG TWICE DAILY IF CRAMPS OCCUR)  Indications: HYPERLIPIDEMIA Senna 8.6 mg tablet Generic drug:  senna Take 1 Tab by mouth daily. VITAMIN B-12 1,000 mcg tablet Generic drug:  cyanocobalamin Take 1,000 mcg by mouth daily. * warfarin 5 mg tablet Commonly known as:  COUMADIN Take 5 mg by mouth daily. * warfarin 4 mg tablet Commonly known as:  COUMADIN  
 One tablet on Tuesday and Thursday, 5 mg other days * Notice: This list has 4 medication(s) that are the same as other medications prescribed for you. Read the directions carefully, and ask your doctor or other care provider to review them with you. Prescriptions Sent to Pharmacy Refills  
 warfarin (COUMADIN) 4 mg tablet 1 Sig: One tablet on Tuesday and Thursday, 5 mg other days Class: Normal  
 Pharmacy: [address] [place] [address] #: [phone] We Performed the Following AMB POC PT/INR [50382 CPT(R)] COLLECTION CAPILLARY BLOOD SPECIMEN [30542 CPT(R)] Follow-up Instructions Return in about 1 week (around 4/14/2017). Patient Instructions Coumadin dosing works better in the evening, at least 2 hours after your supper meal. 
 
Your daily dose of coumadin is as follows. Monday 5 MG Tuesday 4 MG Wednesday 5 MG Thursday 4 MG Friday 5 MG Saturday 5 MG Sunday 5 MG Please provide this summary of care documentation to your next provider. Your primary care clinician is listed as [address]. If you have any questions after today's visit, please call [phone].

## 2017-04-07 NOTE — TELEPHONE ENCOUNTER
Attempted to call. Unsuccessful.  Will attempted to call to check on status on [facility] [de-identified]

## 2017-04-07 NOTE — PROGRESS NOTES
Progress Note    Patient: Kit Roberts MRN: [id number]  SSN: [id number]    YOB: 1950  Age: 74 y.o. Sex: male        Chief Complaint   Patient presents with    Coagulation disorder         Subjective:     Encounter Diagnoses   Name Primary?  Status post surgery: Doing well post surgery however he can still not use either his arms. Yes    Superficial venous thrombosis of right arm: diagnosed after surgery to be related to his right arm disuse. We were told to keep him on Coumadin for 1-2 months. Hope to get clarification on that from his neurologist soon.  Chronic anticoagulation:  Anticoagulation:   Lab Results   Component Value Date/Time    INR 1.0 05/24/2011 02:28 PM    INR POC 2.9 04/07/2017 08:13 AM    INR POC 2.3 04/05/2017 08:32 AM    INR POC 1.1 03/31/2017 11:54 AM    Prothrombin time 10.6 05/24/2011 02:28 PM     Current dose of coumadin: 5 mg daily. He has been off Lovenox since last therapeutic INR  Patient here for followup of chronic anticoagulation. Indication: Superficial thrombophlebitis in his right arm. Bleeding Signs/Symptoms:  None. INR's are drawn regularly and recently have been Therapeutic. INR today is rising and is at 2.9. I will need to decrease his dosage slightly so that he does not get too high. Plan: Coumadin dosing-   decrease to  Monday 5 MG   Tuesday 4 MG   Wednesday 5 MG   Thursday 4 MG   Friday 5 MG   Saturday 5 MG   Sunday 5 MG        Recheck INR in 1 week(s)          Current and past medical information:    Current Medications after this visit[de-identified]   Current Outpatient Prescriptions   Medication Sig    warfarin (COUMADIN) 4 mg tablet One tablet on Tuesday and Thursday, 5 mg other days    warfarin (COUMADIN) 5 mg tablet Take 5 mg by mouth daily.  senna (SENNA) 8.6 mg tablet Take 1 Tab by mouth daily.     amLODIPine (NORVASC) 2.5 mg tablet TAKE ONE TABLET BY MOUTH EVERY DAY    cyanocobalamin (VITAMIN B-12) 1,000 mcg tablet Take 1,000 mcg by mouth daily.  levothyroxine (SYNTHROID) 150 mcg tablet Take 1 Tab by mouth Daily (before breakfast).  pravastatin (PRAVACHOL) 40 mg tablet TAKE ONE TABLET BY MOUTH AT NIGHT ON MONDAY, WEDNESDAY & FRIDAY, (TAKE CoQ 10 100MG TWICE DAILY IF CRAMPS OCCUR)  Indications: HYPERLIPIDEMIA    ezetimibe (ZETIA) 10 mg tablet TAKE ONE TABLET BY MOUTH EVERY DAY    fluticasone (FLONASE) 50 mcg/actuation nasal spray 2 Sprays by Both Nostrils route daily.  avanafil (STENDRA) 100 mg tablet Take  by mouth as needed for Other.  omega-3 fatty acids-vitamin e (FISH OIL) 1,000 mg Cap Take 1 Cap by mouth two (2) times a day.  magnesium oxide (MAG-OXIDE) 400 mg tablet Take 400 mg by mouth daily.  aspirin-dipyridamole (AGGRENOX)  mg per SR capsule Take 1 Cap by mouth two (2) times a day.  [CANCELED] magnesium oxide (MAG-OXIDE) 400 mg tablet Take 1 Tab by mouth daily. No current facility-administered medications for this visit.         Patient Active Problem List    Diagnosis Date Noted    Mixed hyperlipidemia 04/27/2010     Priority: 1 - One    Benign neoplasm of colon 04/27/2010     Priority: 1 - One    Tobacco use disorder 04/27/2010     Priority: 1 - One    Keloid 04/27/2010     Priority: 1 - One    Internal hemorrhoid 04/27/2010     Priority: 1 - One    Thyroglossal duct cyst 09/20/2010     Priority: 3 - Three    History of CVA (cerebrovascular accident) 05/22/2015    HTN (hypertension) 11/11/2014    Prostate cancer ([facility] 04/17/2011    Hypothyroidism 09/25/2010    Boil 09/20/2010       Past Medical History:   Diagnosis Date    Benign neoplasm of colon 4/27/2010    Cancer ([facility]     PROSTATE    HTN (hypertension) 11/11/2014    Hypercholesterolemia     Internal hemorrhoid 4/27/2010    Keloid 4/27/2010    Mixed hyperlipidemia 4/27/2010    Neck mass 4/27/2010    Other ill-defined conditions     HI CHOLESTEROL    Prostate cancer ([address] 4/17/2011    Smoker     Stroke [facility])     Per pt 2014  Thyroid disease     HYPO    Tobacco use disorder 4/27/2010       Allergies   Allergen Reactions    Lipitor [Atorvastatin] Other (comments)     Myalgia      Simvastatin Other (comments)     myalgia         Past Surgical History:   Procedure Laterality Date    HX GI      COLONOSCOPY    HX OTHER SURGICAL      KELOIDS--BACK    HX PROSTATECTOMY      PROSTATE BIOPSY    HX SPLENECTOMY  1969    adhesions----1997       Social History     Social History    Marital status:      Spouse name: N/A    Number of children: N/A    Years of education: N/A     Social History Main Topics    Smoking status: Former Smoker     Packs/day: 0.25     Years: 40.00     Types: Cigarettes     Quit date: 2/9/2015    Smokeless tobacco: Never Used      Comment: smokes 1/2 pack week    Alcohol use 3.0 oz/week     6 Cans of beer per week      Comment: moderately    Drug use: No    Sexual activity: Yes     Other Topics Concern    None     Social History Narrative       Review of Systems   Constitutional: Negative. Negative for chills, fever, malaise/fatigue and weight loss. HENT: Negative. Negative for hearing loss. Eyes: Negative. Negative for blurred vision and double vision. Respiratory: Negative. Negative for cough, hemoptysis, sputum production and shortness of breath. Cardiovascular: Negative. Negative for chest pain, palpitations and orthopnea. Gastrointestinal: Negative. Negative for abdominal pain, blood in stool, heartburn, nausea and vomiting. Genitourinary: Negative. Negative for dysuria, frequency and urgency. Musculoskeletal: Negative. Negative for back pain, myalgias and neck pain. Disuse of both arms. He does have hand function however. Skin: Negative. Negative for rash. Neurological: Negative. Negative for dizziness, tingling, tremors, weakness and headaches. Endo/Heme/Allergies: Negative. Psychiatric/Behavioral: Negative. Negative for depression.        Objective: Vitals:    04/07/17 0805   BP: 131/84   Pulse: 73   Resp: 20   Temp: 97.8 °F (36.6 °C)   TempSrc: Oral   SpO2: 94%   Weight: 205 lb 9.6 oz (93.3 kg)   Height: 5' 11\" (1.803 m)      Body mass index is 28.68 kg/(m^2). Physical Exam   Constitutional: He is well-developed, well-nourished, and in no distress. Wearing a rigid neck brace. HENT:   Head: Normocephalic and atraumatic. Cardiovascular: Normal rate and regular rhythm. Pulmonary/Chest: Effort normal.   Musculoskeletal:   To lift or elevate either arm. Psychiatric: Mood and affect normal.            Health Maintenance Due   Topic Date Due    ZOSTER VACCINE AGE 60>  03/18/2012    GLAUCOMA SCREENING Q2Y  03/18/2017    MEDICARE YEARLY EXAM  03/18/2017         Assessment and orders:       ICD-10-CM ICD-9-CM    1. Status post surgery Z98.890 V45.89 AMB POC PT/INR      COLLECTION CAPILLARY BLOOD SPECIMEN   2. Superficial venous thrombosis of right arm I82.611 453.81 warfarin (COUMADIN) 4 mg tablet   3. Chronic anticoagulation-see changes documented on patient information sheet  Z79.01 V58.61 warfarin (COUMADIN) 4 mg tablet         Plan of care:  Discussed diagnoses in detail with patient. Medication risks/benefits/side effects discussed with patient. All of the patient's questions were addressed. The patient understands and agrees with our plan of care. The patient knows to call back if they are unsure of or forget any changes we discussed today or if the symptoms change. The patient received an After-Visit Summary which contains VS, orders, medication list and allergy list. This can be used as a \"mini-medical record\" should they have to seek medical care while out of town.     Patient Care Team:  Hanna Brady MD as PCP - General (Family Practice)  Jasiel Romero MD (Orthopedic Surgery)  Bety Anthony MD as Physician (Gastroenterology)  Jennifer Moreno RN as Ambulatory Care Navigator    Follow-up Disposition:  Return in about 1 week (around 4/14/2017).     Future Appointments  Date Time Provider Department Center   4/14/2017 8:00 AM Rashmi Mcgee MD [facility] [place] SCHED   5/26/2017 8:20 AM Rashmi Mcgee MD [facility] [place] SCHED       Signed By: Rashmi Mcgee MD     April 7, 2017

## 2017-04-07 NOTE — PROGRESS NOTES
Health Maintenance Due   Topic Date Due    ZOSTER VACCINE AGE 60>  03/18/2012    GLAUCOMA SCREENING Q2Y  03/18/2017    MEDICARE YEARLY EXAM  03/18/2017

## 2017-04-11 ENCOUNTER — TELEPHONE (OUTPATIENT)
Dept: FAMILY MEDICINE CLINIC | Age: 67
End: 2017-04-11

## 2017-04-11 DIAGNOSIS — E78.00 PURE HYPERCHOLESTEROLEMIA: ICD-10-CM

## 2017-04-11 DIAGNOSIS — E78.2 MIXED HYPERLIPIDEMIA: Chronic | ICD-10-CM

## 2017-04-11 RX ORDER — PRAVASTATIN SODIUM 40 MG/1
TABLET ORAL
Qty: 12 TAB | Refills: 0 | OUTPATIENT
Start: 2017-04-11

## 2017-04-11 RX ORDER — PRAVASTATIN SODIUM 40 MG/1
TABLET ORAL
Qty: 12 TAB | Refills: 5 | Status: SHIPPED | OUTPATIENT
Start: 2017-04-11 | End: 2017-09-01 | Stop reason: SDUPTHER

## 2017-04-11 NOTE — TELEPHONE ENCOUNTER
----- Message from [facility] sent at 4/11/2017  4:11 PM EDT -----  Regarding: Dr. Keely Restrepo from [facility] would like a call back regarding patient's INR monitor and medications.  Contact is [phone]

## 2017-04-11 NOTE — LETTER
4/12/2017 9:37 AM 
 
Mr. Cleve Enciso [address] [place], [place] [id number] Current Outpatient Prescriptions Medication Sig  pravastatin (PRAVACHOL) 40 mg tablet TAKE ONE TABLET BY MOUTH AT NIGHT ON MONDAY, [address] & FRIDAY  Indications: hyperlipidemia  warfarin (COUMADIN) 4 mg tablet One tablet on Tuesday and Thursday, 5 mg other days  warfarin (COUMADIN) 5 mg tablet Take 5 mg by mouth daily.  senna (SENNA) 8.6 mg tablet Take 1 Tab by mouth daily.  aspirin-dipyridamole (AGGRENOX)  mg per SR capsule Take 1 Cap by mouth two (2) times a day.  amLODIPine (NORVASC) 2.5 mg tablet TAKE ONE TABLET BY MOUTH EVERY DAY  cyanocobalamin (VITAMIN B-12) 1,000 mcg tablet Take 1,000 mcg by mouth daily.  levothyroxine (SYNTHROID) 150 mcg tablet Take 1 Tab by mouth Daily (before breakfast).  ezetimibe (ZETIA) 10 mg tablet TAKE ONE TABLET BY MOUTH EVERY DAY  fluticasone (FLONASE) 50 mcg/actuation nasal spray 2 Sprays by Both Nostrils route daily.  avanafil (STENDRA) 100 mg tablet Take  by mouth as needed for Other.  omega-3 fatty acids-vitamin e (FISH OIL) 1,000 mg Cap Take 1 Cap by mouth two (2) times a day.  magnesium oxide (MAG-OXIDE) 400 mg tablet Take 400 mg by mouth daily.  [CANCELED] magnesium oxide (MAG-OXIDE) 400 mg tablet Take 1 Tab by mouth daily. No current facility-administered medications for this visit.    
 
 
 
Sincerely, 
 
 
Marcellus Perkins MD

## 2017-04-14 ENCOUNTER — CLINICAL SUPPORT (OUTPATIENT)
Dept: FAMILY MEDICINE CLINIC | Age: 67
End: 2017-04-14

## 2017-04-14 VITALS
HEART RATE: 63 BPM | OXYGEN SATURATION: 98 % | HEIGHT: 71 IN | WEIGHT: 202 LBS | TEMPERATURE: 97.2 F | SYSTOLIC BLOOD PRESSURE: 128 MMHG | DIASTOLIC BLOOD PRESSURE: 83 MMHG | BODY MASS INDEX: 28.28 KG/M2 | RESPIRATION RATE: 16 BRPM

## 2017-04-14 DIAGNOSIS — Z79.01 CURRENT USE OF ANTICOAGULANT THERAPY: Primary | ICD-10-CM

## 2017-04-14 DIAGNOSIS — M54.12 RADICULOPATHY OF CERVICAL SPINE: ICD-10-CM

## 2017-04-14 DIAGNOSIS — I80.8 SUPERFICIAL THROMBOPHLEBITIS OF RIGHT UPPER EXTREMITY: ICD-10-CM

## 2017-04-14 LAB
INR BLD: 3.2
PT POC: 37.9 SEC
VALID INTERNAL CONTROL?: YES

## 2017-04-14 NOTE — PATIENT INSTRUCTIONS
Coumadin dosing works better in the evening, at least 2 hours after your supper meal.    Your daily dose of coumadin is as follows.      Monday 4 MG   Tuesday 5 MG   Wednesday 4 MG   Thursday 5 MG   Friday 4 MG   Saturday 5 MG   Sunday 4 MG

## 2017-04-14 NOTE — MR AVS SNAPSHOT
Visit Information Date & Time Provider Department Dept. Phone Encounter #  
 4/14/2017  8:00 AM Shane Bey MD [address] [place], [place] [address]kiki Wren [id number] Follow-up Instructions Return in about 1 week (around 4/21/2017). Your Appointments 5/26/2017  8:20 AM  
ROUTINE CARE with Shane Bey MD  
[facility] [address] [place] [address]) Appt Note: 4 mo f/u-HTN;Hyperlipidemia [address] [address]  
[place] [address] [address] Upcoming Health Maintenance Date Due ZOSTER VACCINE AGE 60> 3/18/2012 GLAUCOMA SCREENING Q2Y 3/18/2017 MEDICARE YEARLY EXAM 3/18/2017 Pneumococcal 65+ High/Highest Risk (2 of 2 - PPSV23) 9/1/2020 DTaP/Tdap/Td series (2 - Td) 11/11/2024 COLONOSCOPY 10/4/2026 Allergies as of 4/14/2017  Review Complete On: 4/7/2017 By: Shane Bey MD  
  
 Severity Noted Reaction Type Reactions Lipitor [Atorvastatin]  04/27/2010    Other (comments) Myalgia Simvastatin  04/27/2010    Other (comments)  
 myalgia Current Immunizations  Reviewed on 1/4/2016 Name Date Influenza Vaccine 10/23/2013 Influenza Vaccine (Quad) PF 9/21/2016, 10/16/2015 Influenza Vaccine Split 10/26/2012, 10/21/2011, 11/10/2010, 12/3/2009 Pneumococcal Conjugate (PCV-13) 11/30/2016 Pneumococcal Polysaccharide (PPSV-23) 9/1/2015 Tdap 11/11/2014 Not reviewed this visit You Were Diagnosed With   
  
 Codes Comments Current use of anticoagulant therapy    -  Primary ICD-10-CM: Z79.01 
ICD-9-CM: V58.61 Radiculopathy of cervical spine     ICD-10-CM: M54.12 
ICD-9-CM: 723.4 Superficial thrombophlebitis of right upper extremity     ICD-10-CM: I80.8 ICD-9-CM: 451.82 Vitals BP Pulse Temp Resp Height(growth percentile) Weight(growth percentile) 128/83 (BP 1 Location: Right arm, BP Patient Position: Sitting) 63 97.2 °F (36.2 °C) (Oral) 16 5' 11\" (1.803 m) 202 lb (91.6 kg) SpO2 BMI Smoking Status 98% 28.17 kg/m2 Former Smoker Vitals History BMI and BSA Data Body Mass Index Body Surface Area  
 28.17 kg/m 2 2.14 m 2 Preferred Pharmacy Pharmacy Name Phone [facility] [phone] Your Updated Medication List  
  
   
This list is accurate as of: 4/14/17  8:43 AM.  Always use your most recent med list. amLODIPine 2.5 mg tablet Commonly known as:  Tabby Moore TAKE ONE TABLET BY MOUTH EVERY DAY  
  
 aspirin-dipyridamole  mg per SR capsule Commonly known as:  AGGRENOX Take 1 Cap by mouth two (2) times a day. avanafil 100 mg tablet Commonly known as:  YNMDZOL Take  by mouth as needed for Other.  
  
 ezetimibe 10 mg tablet Commonly known as:  Ilan Grapes TAKE ONE TABLET BY MOUTH EVERY DAY  
  
 FISH OIL 1,000 mg Cap Generic drug:  omega-3 fatty acids-vitamin e Take 1 Cap by mouth two (2) times a day. fluticasone 50 mcg/actuation nasal spray Commonly known as:  Schmitt Peter 2 Sprays by Both Nostrils route daily. levothyroxine 150 mcg tablet Commonly known as:  SYNTHROID Take 1 Tab by mouth Daily (before breakfast). * MAG-OXIDE 400 mg tablet Generic drug:  magnesium oxide Take 400 mg by mouth daily. * magnesium oxide 400 mg tablet Commonly known as:  MAG-OXIDE Take 1 Tab by mouth daily. pravastatin 40 mg tablet Commonly known as:  PRAVACHOL  
TAKE ONE TABLET BY MOUTH AT NIGHT ON MONDAY, [facility] & FRIDAY  Indications: hyperlipidemia Senna 8.6 mg tablet Generic drug:  senna Take 1 Tab by mouth daily. VITAMIN B-12 1,000 mcg tablet Generic drug:  cyanocobalamin Take 1,000 mcg by mouth daily. * warfarin 5 mg tablet Commonly known as:  COUMADIN  
 Take 5 mg by mouth daily. * warfarin 4 mg tablet Commonly known as:  COUMADIN One tablet on Tuesday and Thursday, 5 mg other days * Notice: This list has 4 medication(s) that are the same as other medications prescribed for you. Read the directions carefully, and ask your doctor or other care provider to review them with you. April 2017 Details Sun Mon Tue Wed Thu Fri Sat  
        1  
  
  
  
  
  2  
  
  
  
   3  
  
  
  
   4  
  
  
  
   5  
  
  
  
   6  
  
  
  
   7  
  
  
  
   8  
  
  
  
  
  9  
  
  
  
   10  
  
  
  
   11  
  
  
  
   12  
  
  
  
   13  
  
  
  
   14 3.2  
5 mg See details 15  
  
5 mg  
  
  
  16  
  
5 mg  
  
   17  
  
5 mg  
  
   18  
  
4 mg 19  
  
5 mg  
  
   20  
  
4 mg  
  
   21  
  
5 mg  
  
   22  
  
  
  
  
  23  
  
  
  
   24  
  
  
  
   25  
  
  
  
   26  
  
  
  
   27  
  
  
  
   28  
  
  
  
   29  
  
  
  
  
  30  
  
  
  
        
 Date Details 04/14 This INR check INR: 3.2 Date of next INR:  4/21/2017 How to take your warfarin dose To take:  4 mg Take 1 of the 4 mg tablets. To take:  5 mg Take 1 of the 5 mg tablets. We Performed the Following AMB POC PT/INR [59768 CPT(R)] COLLECTION CAPILLARY BLOOD SPECIMEN [46267 CPT(R)] Follow-up Instructions Return in about 1 week (around 4/21/2017). Patient Instructions Coumadin dosing works better in the evening, at least 2 hours after your supper meal. 
 
Your daily dose of coumadin is as follows. Monday 4 MG Tuesday 5 MG Wednesday 4 MG Thursday 5 MG Friday 4 MG Saturday 5 MG Sunday 4 MG Please provide this summary of care documentation to your next provider. Your primary care clinician is listed as [address]. If you have any questions after today's visit, please call [phone].

## 2017-04-14 NOTE — PROGRESS NOTES
Progress Note    Patient: Fito Tan MRN: [id number]  SSN: [id number]    YOB: 1950  Age: 74 y.o. Sex: male        Chief Complaint   Patient presents with    Coagulation disorder         Subjective:     Encounter Diagnoses   Name Primary?  Current use of anticoagulant therapy  Anticoagulation:   Lab Results   Component Value Date/Time    INR 1.0 05/24/2011 02:28 PM    INR POC 3.2 04/14/2017 08:27 AM    INR POC 2.9 04/07/2017 08:13 AM    INR POC 2.3 04/05/2017 08:32 AM    Prothrombin time 10.6 05/24/2011 02:28 PM       Current dose of coumadin:  Patient here for followup of chronic anticoagulation. Indication: arm superficial thrombophlebitis. .  Bleeding Signs/Symptoms:  None. INR's are drawn regularly and recently have been Therapeutic. INR today is  3.2. Plan: Coumadin dosing-   decrease to   Monday 4 MG   Tuesday 5 MG   Wednesday 4 MG   Thursday 5 MG   Friday 4 MG   Saturday 5 MG   Sunday 4 MG       Recheck INR in 1 week(s)     Yes    Radiculopathy of cervical spine:  S/p surgery , now with bilateral C5-6 dysfunction. Tono Rios Superficial thrombophlebitis of right upper extremity: on Coumadin              Current and past medical information:    Current Medications after this visit[de-identified]   Current Outpatient Prescriptions   Medication Sig    pravastatin (PRAVACHOL) 40 mg tablet TAKE ONE TABLET BY MOUTH AT NIGHT ON [facility] & FRIDAY  Indications: hyperlipidemia    warfarin (COUMADIN) 4 mg tablet One tablet on Tuesday and Thursday, 5 mg other days    warfarin (COUMADIN) 5 mg tablet Take 5 mg by mouth daily.  senna (SENNA) 8.6 mg tablet Take 1 Tab by mouth daily.  amLODIPine (NORVASC) 2.5 mg tablet TAKE ONE TABLET BY MOUTH EVERY DAY    cyanocobalamin (VITAMIN B-12) 1,000 mcg tablet Take 1,000 mcg by mouth daily.  levothyroxine (SYNTHROID) 150 mcg tablet Take 1 Tab by mouth Daily (before breakfast).     ezetimibe (ZETIA) 10 mg tablet TAKE ONE TABLET BY MOUTH EVERY DAY    avanafil (STENDRA) 100 mg tablet Take  by mouth as needed for Other.  omega-3 fatty acids-vitamin e (FISH OIL) 1,000 mg Cap Take 1 Cap by mouth two (2) times a day.  magnesium oxide (MAG-OXIDE) 400 mg tablet Take 400 mg by mouth daily.  aspirin-dipyridamole (AGGRENOX)  mg per SR capsule Take 1 Cap by mouth two (2) times a day.  fluticasone (FLONASE) 50 mcg/actuation nasal spray 2 Sprays by Both Nostrils route daily.  [CANCELED] magnesium oxide (MAG-OXIDE) 400 mg tablet Take 1 Tab by mouth daily. No current facility-administered medications for this visit.         Patient Active Problem List    Diagnosis Date Noted    Mixed hyperlipidemia 04/27/2010     Priority: 1 - One    Benign neoplasm of colon 04/27/2010     Priority: 1 - One    Tobacco use disorder 04/27/2010     Priority: 1 - One    Keloid 04/27/2010     Priority: 1 - One    Internal hemorrhoid 04/27/2010     Priority: 1 - One    Thyroglossal duct cyst 09/20/2010     Priority: 3 - Three    History of CVA (cerebrovascular accident) 05/22/2015    HTN (hypertension) 11/11/2014    Prostate cancer ([facility] 04/17/2011    Hypothyroidism 09/25/2010    Boil 09/20/2010       Past Medical History:   Diagnosis Date    Benign neoplasm of colon 4/27/2010    Cancer ([facility] [address]     PROSTATE    HTN (hypertension) 11/11/2014    Hypercholesterolemia     Internal hemorrhoid 4/27/2010    Keloid 4/27/2010    Mixed hyperlipidemia 4/27/2010    Neck mass 4/27/2010    Other ill-defined conditions     HI CHOLESTEROL    Prostate cancer ([address] 4/17/2011    Smoker     Stroke [facility])     Per pt 2014    Thyroid disease     HYPO    Tobacco use disorder 4/27/2010       Allergies   Allergen Reactions    Lipitor [Atorvastatin] Other (comments)     Myalgia      Simvastatin Other (comments)     myalgia         Past Surgical History:   Procedure Laterality Date    HX GI      COLONOSCOPY    HX OTHER SURGICAL      KELOIDS--BACK    HX PROSTATECTOMY PROSTATE BIOPSY    HX SPLENECTOMY  1969    adhesions----1997       Social History     Social History    Marital status:      Spouse name: N/A    Number of children: N/A    Years of education: N/A     Social History Main Topics    Smoking status: Former Smoker     Packs/day: 0.25     Years: 40.00     Types: Cigarettes     Quit date: 2/9/2015    Smokeless tobacco: Never Used      Comment: smokes 1/2 pack week    Alcohol use 3.0 oz/week     6 Cans of beer per week      Comment: moderately    Drug use: No    Sexual activity: Yes     Other Topics Concern    None     Social History Narrative       Review of Systems   Constitutional: Negative. Negative for chills, fever, malaise/fatigue and weight loss. HENT: Negative. Negative for hearing loss. Eyes: Negative. Negative for blurred vision and double vision. Respiratory: Negative. Negative for cough, hemoptysis, sputum production and shortness of breath. Cardiovascular: Negative. Negative for chest pain, palpitations and orthopnea. Gastrointestinal: Negative. Negative for abdominal pain, blood in stool, heartburn, nausea and vomiting. Genitourinary: Negative. Negative for dysuria, frequency and urgency. Musculoskeletal: Negative. Negative for back pain, myalgias and neck pain. Bilateral arm dysfunction -unable to raise his arms   Skin: Negative. Negative for rash. Neurological: Negative. Negative for dizziness, tingling, tremors, weakness and headaches. Endo/Heme/Allergies: Negative. Psychiatric/Behavioral: Negative. Negative for depression. Objective:     Vitals:    04/14/17 0815   BP: 128/83   Pulse: 63   Resp: 16   Temp: 97.2 °F (36.2 °C)   TempSrc: Oral   SpO2: 98%   Weight: 202 lb (91.6 kg)   Height: 5' 11\" (1.803 m)      Body mass index is 28.17 kg/(m^2). Physical Exam   Constitutional: He is oriented to person, place, and time and well-developed, well-nourished, and in no distress.    HENT:   Head: Normocephalic and atraumatic. Mouth/Throat: Oropharynx is clear and moist.   Eyes: Right eye exhibits no discharge. Left eye exhibits no discharge. No scleral icterus. Neck: No tracheal deviation present. No thyromegaly present. No bruit. Cardiovascular: Normal rate, regular rhythm and normal heart sounds. Pulmonary/Chest: Effort normal and breath sounds normal.   Abdominal: Soft. Musculoskeletal:   Unable to lift either arm   Neurological: He is alert and oriented to person, place, and time. Skin: No rash noted. No erythema. Psychiatric: Mood and affect normal.   Nursing note and vitals reviewed. Health Maintenance Due   Topic Date Due    ZOSTER VACCINE AGE 60>  03/18/2012    GLAUCOMA SCREENING Q2Y  03/18/2017    MEDICARE YEARLY EXAM  03/18/2017         Assessment and orders:       ICD-10-CM ICD-9-CM    1. Current use of anticoagulant therapy-high INR dose adjusted. I hope that by next week he will have his own in the INR machine  Z79.01 V58.61 AMB POC PT/INR      COLLECTION CAPILLARY BLOOD SPECIMEN   2. Radiculopathy of cervical spine--no improvement in his loss of arm function . He had an MRI at [facility] results are pending and he has a neurology appointment next week    M54.12 723.4    3. Superficial thrombophlebitis of right upper extremity. I80.8 451.82          Plan of care:  Discussed diagnoses in detail with patient. Medication risks/benefits/side effects discussed with patient. All of the patient's questions were addressed. The patient understands and agrees with our plan of care. The patient knows to call back if they are unsure of or forget any changes we discussed today or if the symptoms change. The patient received an After-Visit Summary which contains VS, orders, medication list and allergy list. This can be used as a \"mini-medical record\" should they have to seek medical care while out of town.     Patient Care Team:  Paresh Silva MD as PCP - General (Family Practice)  Francoise Aviles MD (Orthopedic Surgery)  Jen Joseph MD as Physician (Gastroenterology)  Ehsan Bradley, RN as Ambulatory Care Navigator    Follow-up Disposition:  Return in about 1 week (around 4/21/2017).     Future Appointments  Date Time Provider Department Center   4/21/2017 8:20 AM Jaylin Campbell MD [facility]   5/26/2017 8:20 AM Jaylin Capmbell MD [facility]       Signed By: Jaylin Campbell MD     April 14, 2017

## 2017-04-20 ENCOUNTER — TELEPHONE (OUTPATIENT)
Dept: FAMILY MEDICINE CLINIC | Age: 67
End: 2017-04-20

## 2017-04-20 NOTE — TELEPHONE ENCOUNTER
Returned phone call to patient. Patient states that Sweetie Coughlin is coming to his home tomorrow at 11:30 to drop of his INR machine. Advised patient that we could cancel his appointment tomorrow for an INR check in the office since his machine will be in. Patient verbalized understanding.

## 2017-04-20 NOTE — TELEPHONE ENCOUNTER
----- Message from Edgar Morgan sent at 4/20/2017  8:54 AM EDT -----  Regarding: /telephone  Pt would like a return to confirm whether or not he needs to cancel appointment scheduled for tomorrow. Nilton Holley is sending the PT INR machine to his house tomorrow at 11 am. Pt wants to know if it is okay for him to get the PT INR checked at 11:30am instead of 8:30am. If is okay the pt would like to cancel tomorrow's appointment. Pt can be reached at [phone].

## 2017-04-21 ENCOUNTER — TELEPHONE (OUTPATIENT)
Dept: FAMILY MEDICINE CLINIC | Age: 67
End: 2017-04-21

## 2017-04-21 ENCOUNTER — CLINICAL SUPPORT (OUTPATIENT)
Dept: FAMILY MEDICINE CLINIC | Age: 67
End: 2017-04-21

## 2017-04-21 NOTE — TELEPHONE ENCOUNTER
I personally spoke with Mr. Diaz Reagan vascular surgeon at [facility] to ask him how long the anticoagulation should continue. He told me 1-2 months. I told him I would continue it for 2 months to make absolutely sure that there is no recurrence. He agreed with that so that is our plan.

## 2017-05-01 ENCOUNTER — TELEPHONE (OUTPATIENT)
Dept: FAMILY MEDICINE CLINIC | Age: 67
End: 2017-05-01

## 2017-05-01 NOTE — TELEPHONE ENCOUNTER
Received fax from [facility]. INR 3.1 Patient currently taking 4mg of Coumadin on Mon, Wed, Friday, Sunday and 5mg all other days. Per Dr. Boom Dewitt: Continue current dose and recheck in 1 week. Stop taking Coumadin 2 months after starting the Lovenox. Patient is to take his last dose of Coumadin on May 26th. Patient verbalized understanding and states that he marked the date on his calendar.

## 2017-05-05 ENCOUNTER — TELEPHONE (OUTPATIENT)
Dept: FAMILY MEDICINE CLINIC | Age: 67
End: 2017-05-05

## 2017-05-05 NOTE — TELEPHONE ENCOUNTER
Received fax from [facility]. INR 2.7 Patient currently taking 4mg of Coumadin on Mon, Wed, Friday, Sunday and 5mg all other days. Per Dr. Silverio Ortiz: Continue current dose and recheck in 1 week. Stop taking Coumadin 2 months after starting the Lovenox. Patient is to take his last dose of Coumadin on May 26th. Patient verbalized understanding and states that he marked the date on his calendar.

## 2017-05-10 ENCOUNTER — TELEPHONE (OUTPATIENT)
Dept: FAMILY MEDICINE CLINIC | Age: 67
End: 2017-05-10

## 2017-05-10 DIAGNOSIS — Z79.01 CHRONIC ANTICOAGULATION: ICD-10-CM

## 2017-05-10 DIAGNOSIS — I82.611 SUPERFICIAL VENOUS THROMBOSIS OF RIGHT ARM: ICD-10-CM

## 2017-05-10 RX ORDER — WARFARIN SODIUM 5 MG/1
TABLET ORAL
Qty: 20 TAB | Refills: 1 | Status: SHIPPED | OUTPATIENT
Start: 2017-05-10 | End: 2017-05-26

## 2017-05-10 RX ORDER — WARFARIN SODIUM 5 MG/1
TABLET ORAL
Qty: 20 TAB | Refills: 0 | Status: SHIPPED | OUTPATIENT
Start: 2017-05-10 | End: 2017-05-10 | Stop reason: SDUPTHER

## 2017-05-10 RX ORDER — WARFARIN 4 MG/1
TABLET ORAL
Qty: 30 TAB | Refills: 1 | Status: SHIPPED | OUTPATIENT
Start: 2017-05-10 | End: 2017-05-26

## 2017-05-10 NOTE — TELEPHONE ENCOUNTER
Per Dr. Noemy Bauer, patient taking Coumadin 4 mg Mon, Wed, Friday, Sunday. Coumadin 5 mg Tues, Thurs, Saturday. New Rx's sent to [facility] pharmacy with most current directions. Requested Prescriptions     Signed Prescriptions Disp Refills    warfarin (COUMADIN) 5 mg tablet 20 Tab 1     Sig: Take 1 tablet Tuesday, Thursday, and Saturday. Authorizing Provider: Kacie Odonnell     Ordering User: David Pena warfarin (COUMADIN) 4 mg tablet 30 Tab 1     Sig: Take 1 tablet on Monday, Wednesday, Friday, and Sunday.      Authorizing Provider: Kacie Viera User: Loki Puentes

## 2017-05-10 NOTE — TELEPHONE ENCOUNTER
Patient needs a prescription for War fin 2 tablets 5 milligrams sent to the [facility] at [phone].         Last office visit was 3/31/17

## 2017-05-12 ENCOUNTER — TELEPHONE (OUTPATIENT)
Dept: FAMILY MEDICINE CLINIC | Age: 67
End: 2017-05-12

## 2017-05-12 NOTE — TELEPHONE ENCOUNTER
Received fax from [facility]. INR: 2.2 Phone call to patient. Per Dr. Alejandra Monico: Continue current dose and recheck in one week. Patient verbalized understanding.

## 2017-05-19 ENCOUNTER — TELEPHONE (OUTPATIENT)
Dept: FAMILY MEDICINE CLINIC | Age: 67
End: 2017-05-19

## 2017-05-24 ENCOUNTER — TELEPHONE (OUTPATIENT)
Dept: FAMILY MEDICINE CLINIC | Age: 67
End: 2017-05-24

## 2017-05-24 NOTE — TELEPHONE ENCOUNTER
Phone call to patient, no answer. Per Dr. Elihue Runner: Wait 5 days before Aggrenox restart. Should restart Aggrenox on Tuesday, May 30th.

## 2017-05-24 NOTE — TELEPHONE ENCOUNTER
Patient's last day for Warfarin is today. Should he start back on the Aggrenox tomorrow?   Please advise at [phone]

## 2017-05-26 ENCOUNTER — OFFICE VISIT (OUTPATIENT)
Dept: FAMILY MEDICINE CLINIC | Age: 67
End: 2017-05-26

## 2017-05-26 VITALS
HEIGHT: 71 IN | HEART RATE: 66 BPM | DIASTOLIC BLOOD PRESSURE: 85 MMHG | OXYGEN SATURATION: 94 % | BODY MASS INDEX: 28.56 KG/M2 | RESPIRATION RATE: 20 BRPM | TEMPERATURE: 97.7 F | WEIGHT: 204 LBS | SYSTOLIC BLOOD PRESSURE: 145 MMHG

## 2017-05-26 DIAGNOSIS — E03.4 HYPOTHYROIDISM DUE TO ACQUIRED ATROPHY OF THYROID: Chronic | ICD-10-CM

## 2017-05-26 DIAGNOSIS — E78.2 MIXED HYPERLIPIDEMIA: Chronic | ICD-10-CM

## 2017-05-26 DIAGNOSIS — M54.10 RADICULOPATHY AFFECTING UPPER EXTREMITY: ICD-10-CM

## 2017-05-26 DIAGNOSIS — I10 ESSENTIAL HYPERTENSION: Primary | Chronic | ICD-10-CM

## 2017-05-26 NOTE — MR AVS SNAPSHOT
Visit Information Date & Time Provider Department Dept. Phone Encounter #  
 5/26/2017  9:30 AM Araseli Zavala, 7 Ana Schmitt [id number] Your Appointments 9/22/2017  1:25 PM  
Medicare Physical with Yogesh Saini MD  
[facility] [address] [place] [address]) Appt Note: [facility] [place]  
 [address] [address]  
[place] [address] [address] Upcoming Health Maintenance Date Due ZOSTER VACCINE AGE 60> 3/18/2012 GLAUCOMA SCREENING Q2Y 3/18/2017 MEDICARE YEARLY EXAM 3/18/2017 INFLUENZA AGE 9 TO ADULT 8/1/2017 Pneumococcal 65+ High/Highest Risk (2 of 2 - PPSV23) 9/1/2020 DTaP/Tdap/Td series (2 - Td) 11/11/2024 COLONOSCOPY 10/4/2026 Allergies as of 5/26/2017  Review Complete On: 5/26/2017 By: Zina Watt Severity Noted Reaction Type Reactions Lipitor [Atorvastatin]  04/27/2010    Other (comments) Myalgia Simvastatin  04/27/2010    Other (comments)  
 myalgia Current Immunizations  Reviewed on 1/4/2016 Name Date Influenza Vaccine 10/23/2013 Influenza Vaccine (Quad) PF 9/21/2016, 10/16/2015 Influenza Vaccine Split 10/26/2012, 10/21/2011, 11/10/2010, 12/3/2009 Pneumococcal Conjugate (PCV-13) 11/30/2016 Pneumococcal Polysaccharide (PPSV-23) 9/1/2015 Tdap 11/11/2014 Not reviewed this visit You Were Diagnosed With   
  
 Codes Comments Essential hypertension    -  Primary ICD-10-CM: I10 
ICD-9-CM: 401.9 Mixed hyperlipidemia     ICD-10-CM: E78.2 ICD-9-CM: 272.2 Hypothyroidism due to acquired atrophy of thyroid     ICD-10-CM: E03.4 ICD-9-CM: 244.8, 246.8 Radiculopathy affecting upper extremity     ICD-10-CM: M54.10 ICD-9-CM: 723.4 Vitals BP Pulse Temp Resp Height(growth percentile) Weight(growth percentile) 145/85 66 97.7 °F (36.5 °C) (Oral) 20 5' 11\" (1.803 m) 204 lb (92.5 kg) SpO2 BMI Smoking Status 94% 28.45 kg/m2 Former Smoker Vitals History BMI and BSA Data Body Mass Index Body Surface Area  
 28.45 kg/m 2 2.15 m 2 Preferred Pharmacy Pharmacy Name Phone [facility] [address] [phone] Your Updated Medication List  
  
   
This list is accurate as of: 5/26/17  9:56 AM.  Always use your most recent med list. amLODIPine 2.5 mg tablet Commonly known as:  Herlene Pupa TAKE ONE TABLET BY MOUTH EVERY DAY  
  
 aspirin-dipyridamole  mg per SR capsule Commonly known as:  AGGRENOX Take 1 Cap by mouth two (2) times a day. avanafil 100 mg tablet Commonly known as:  RCEKNVJ Take  by mouth as needed for Other.  
  
 ezetimibe 10 mg tablet Commonly known as:  Shane Gamino TAKE ONE TABLET BY MOUTH EVERY DAY  
  
 FISH OIL 1,000 mg Cap Generic drug:  omega-3 fatty acids-vitamin e Take 1 Cap by mouth two (2) times a day. fluticasone 50 mcg/actuation nasal spray Commonly known as:  Ashley Zambrano 2 Sprays by Both Nostrils route daily. levothyroxine 150 mcg tablet Commonly known as:  SYNTHROID Take 1 Tab by mouth Daily (before breakfast). * MAG-OXIDE 400 mg tablet Generic drug:  magnesium oxide Take 400 mg by mouth daily. * magnesium oxide 400 mg tablet Commonly known as:  MAG-OXIDE Take 1 Tab by mouth daily. pravastatin 40 mg tablet Commonly known as:  PRAVACHOL  
TAKE ONE TABLET BY MOUTH AT NIGHT ON MONDAY, [facility] & FRIDAY  Indications: hyperlipidemia Senna 8.6 mg tablet Generic drug:  senna Take 1 Tab by mouth daily. VITAMIN B-12 1,000 mcg tablet Generic drug:  cyanocobalamin Take 1,000 mcg by mouth daily. * warfarin 5 mg tablet Commonly known as:  COUMADIN Take 1 tablet Tuesday, Thursday, and Saturday. * warfarin 4 mg tablet Commonly known as:  COUMADIN Take 1 tablet on Monday, Wednesday, Friday, and Sunday. * Notice: This list has 4 medication(s) that are the same as other medications prescribed for you. Read the directions carefully, and ask your doctor or other care provider to review them with you. We Performed the Following CBC W/O DIFF [18820 CPT(R)] CRP, HIGH SENSITIVITY [05006 CPT(R)] METABOLIC PANEL, COMPREHENSIVE [54661 CPT(R)] SED RATE (ESR) [id number] CPT(R)] TSH 3RD GENERATION [73905 CPT(R)] Patient Instructions A Healthy Lifestyle: Care Instructions Your Care Instructions A healthy lifestyle can help you feel good, stay at a healthy weight, and have plenty of energy for both work and play. A healthy lifestyle is something you can share with your whole family. A healthy lifestyle also can lower your risk for serious health problems, such as high blood pressure, heart disease, and diabetes. You can follow a few steps listed below to improve your health and the health of your family. Follow-up care is a key part of your treatment and safety. Be sure to make and go to all appointments, and call your doctor if you are having problems. Its also a good idea to know your test results and keep a list of the medicines you take. How can you care for yourself at home? · Do not eat too much sugar, fat, or fast foods. You can still have dessert and treats now and then. The goal is moderation. · Start small to improve your eating habits. Pay attention to portion sizes, drink less juice and soda pop, and eat more fruits and vegetables. ¨ Eat a healthy amount of food. A 3-ounce serving of meat, for example, is about the size of a deck of cards. Fill the rest of your plate with vegetables and whole grains. ¨ Limit the amount of soda and sports drinks you have every day. Drink more water when you are thirsty. ¨ Eat at least 5 servings of fruits and vegetables every day. It may seem like a lot, but it is not hard to reach this goal. A serving or helping is 1 piece of fruit, 1 cup of vegetables, or 2 cups of leafy, raw vegetables. Have an apple or some carrot sticks as an afternoon snack instead of a candy bar. Try to have fruits and/or vegetables at every meal. 
· Make exercise part of your daily routine. You may want to start with simple activities, such as walking, bicycling, or slow swimming. Try to be active 30 to 60 minutes every day. You do not need to do all 30 to 60 minutes all at once. For example, you can exercise 3 times a day for 10 or 20 minutes. Moderate exercise is safe for most people, but it is always a good idea to talk to your doctor before starting an exercise program. 
· Keep moving. Wolfgang [facility] the lawn, work in the garden, or [employer]. Take the stairs instead of the elevator at work. · If you smoke, quit. People who smoke have an increased risk for heart attack, stroke, cancer, and other lung illnesses. Quitting is hard, but there are ways to boost your chance of quitting tobacco for good. ¨ Use nicotine gum, patches, or lozenges. ¨ Ask your doctor about stop-smoking programs and medicines. ¨ Keep trying. In addition to reducing your risk of diseases in the future, you will notice some benefits soon after you stop using tobacco. If you have shortness of breath or asthma symptoms, they will likely get better within a few weeks after you quit. · Limit how much alcohol you drink. Moderate amounts of alcohol (up to 2 drinks a day for men, 1 drink a day for women) are okay. But drinking too much can lead to liver problems, high blood pressure, and other health problems. Family health If you have a family, there are many things you can do together to improve your health. · Eat meals together as a family as often as possible. · Eat healthy foods.  This includes fruits, vegetables, lean meats and dairy, and whole grains. · Include your family in your fitness plan. Most people think of activities such as jogging or tennis as the way to fitness, but there are many ways you and your family can be more active. Anything that makes you breathe hard and gets your heart pumping is exercise. Here are some tips: 
¨ Walk to do errands or to take your child to school or the bus. ¨ Go for a family bike ride after dinner instead of watching TV. Where can you learn more? Go to http://rosa-becky.info/. Enter D766 in the search box to learn more about \"A Healthy Lifestyle: Care Instructions. \" Current as of: July 26, 2016 Content Version: 11.2 © 5335-0935 [employer]. Care instructions adapted under license by [employer] (which disclaims liability or warranty for this information). If you have questions about a medical condition or this instruction, always ask your healthcare professional. [place] [address] any warranty or liability for your use of this information. Introducing [facility]! Dear Sanjay Yepez: Thank you for requesting a Drive[employer] account. Our records indicate that you have previously registered for a DriveHQ account but its currently inactive. Please call our DriveHQ support line at [phone]. Additional Information If you have questions, please visit the Frequently Asked Questions section of the DriveHQ website at [email or web address] Remember, DriveHQ is NOT to be used for urgent needs. For medical emergencies, dial 911. Now available from your iPhone and Android! Please provide this summary of care documentation to your next provider. Your primary care clinician is listed as [address]. If you have any questions after today's visit, please call [phone].

## 2017-05-26 NOTE — PATIENT INSTRUCTIONS

## 2017-05-26 NOTE — PROGRESS NOTES
Flaco Kemp is an 74 y.o. male who presents with chief concern of HTN, blood clot. Chart reviewed. Patient has completed his last dose of warfarin and is to resume aggrenox on Tuesday. He has a complicated medical condition of acute upper extremity weakness. He has been treated with a spinal orthopedic neurosurgeon at [facility]. He post operatively had a thrombophlebitis and has recently completed 2 months of warfarin. Plan to resume aggrenox on Tuesday. Neurologist: Dr. Martin Hernandez    HTN:   Doing OK  Typically 140/80 at home. States it usually in 130/70s. No CP or SOB, no palpitations. No leg swelling. He admits leg aching. Detailed ROS below. Review of Systems, positives are bolded, strike through if denied. GEN:    CV:      Pulm:    Abd/GI:   Psych:    Neuro:     ENT:      Endocrine:     :      MSK:    Improving bilateral UE ROM and strength with PT. Admits Right lower back pain. Skin:    Lower Ext:           Current and past medical information:  I personally reviewed and included updated list below.     Past Medical History:   Diagnosis Date    Benign neoplasm of colon 4/27/2010    Cancer ([facility] [address]     PROSTATE    HTN (hypertension) 11/11/2014    Hypercholesterolemia     Internal hemorrhoid 4/27/2010    Keloid 4/27/2010    Mixed hyperlipidemia 4/27/2010    Neck mass 4/27/2010    Other ill-defined conditions     HI CHOLESTEROL    Prostate cancer ([facility] [address] 4/17/2011    Smoker     Stroke [facility])     Per pt 2014    Thyroid disease     HYPO    Tobacco use disorder 4/27/2010       Allergies   Allergen Reactions    Lipitor [Atorvastatin] Other (comments)     Myalgia      Simvastatin Other (comments)     myalgia         Past Surgical History:   Procedure Laterality Date    HX GI      COLONOSCOPY    HX OTHER SURGICAL      KELOIDS--BACK    HX PROSTATECTOMY      PROSTATE BIOPSY    HX SPLENECTOMY  1969    adhesions----1997       Social History     Social History    Marital status:      Spouse name: N/A    Number of children: N/A    Years of education: N/A     Social History Main Topics    Smoking status: Former Smoker     Packs/day: 0.25     Years: 40.00     Types: Cigarettes     Quit date: 2/9/2015    Smokeless tobacco: Never Used      Comment: smokes 1/2 pack week    Alcohol use 3.0 oz/week     6 Cans of beer per week      Comment: moderately    Drug use: No    Sexual activity: Yes     Other Topics Concern    None     Social History Narrative           Physical Exam, Abnormal/pertinent findings bolded,     Visit Vitals    /85    Pulse 66    Temp 97.7 °F (36.5 °C) (Oral)    Resp 20    Ht 5' 11\" (1.803 m)    Wt 204 lb (92.5 kg)    SpO2 94%    BMI 28.45 kg/m2          GEN:    Alert and Oriented, No acute distress  Psych:  Mood appropriate, No pressured speech, linear thoughts  CV:    Regular Rhythm, S1 and S2 audible, no MRG, no palpable thrills  Pulm:    CTA B/L, no wheezes/rubs  GI/Abd:   Non-tender to palpation, normal bowel sounds x4, no masses, (-) involuntary or voluntary guarding  Neuro:   Lucid, No focal deficits  ENT:    EOMI, Non-icteric sclera, MMM  Neck:   Trachea midline, no lymphadenopathy  :    No suprapubic tenderness  MSK:  Left upper extremity with severe, right moderately restricted ROM in many planes (flexion, extension, abduction),  strength intact, Mild tenderness to palpation in right lower back, Antalgic gait FROM in all four extremities  Skin:   No visible Rash, Ecchymosis, or Excoriations  Lower Ext: No edema, No tenderness to palpation, No palpable cords        Assessment/PLAN    1. Essential hypertension  - Stable today. Continue current medication for today. Continue to monitor renal function and thyroid. - CBC W/O DIFF  - METABOLIC PANEL, COMPREHENSIVE  - TSH 3RD GENERATION  - CRP, HIGH SENSITIVITY  - SED RATE (ESR)    2.  Mixed hyperlipidemia  Lab Results   Component Value Date/Time    Cholesterol, total 169 01/25/2017 09:19 AM    HDL Cholesterol 57 01/25/2017 09:19 AM    LDL, calculated 97 01/25/2017 09:19 AM    VLDL, calculated 15 01/25/2017 09:19 AM    Triglyceride 77 01/25/2017 09:19 AM    CHOL/HDL Ratio 3.9 11/10/2010 01:21 AM   Doing well. Advised continue exercise and healthy lifestyle. Continue to work with PT. Continue pravachol     3. Hypothyroidism due to acquired atrophy of thyroid  - Continue to monitor TSH and adjust medication dose based on results. No symptoms of hypo or hyperthyroid. - TSH 3RD GENERATION    4. Radiculopathy affecting upper extremity  Unclear the causality of his symptoms. However, based on Dr. Renu Luna he is improving with PT post-operatively. .   - CRP, HIGH SENSITIVITY  - SED RATE (ESR)    Follow-up Disposition: Not on File  For next visit follow up PT results. Follow up inflammatory labs. Consider spinal fluid analysis. Plan of care:  Discussed diagnoses in detail with patient. Medication risks/benefits/side effects discussed with patient. All of the patient's questions were addressed. The patient understands and agrees with our plan of care. The patient knows to call back if they are unsure of or forget any changes we discussed today or if the symptoms change. The patient received an After-Visit Summary which contains VS, orders, medication list and allergy list. This can be used as a \"mini-medical record\" should they have to seek medical care while out of town.       Heydi Razo DO  Resident note, PGY-3  Patient discussed with [address] [place] [address] Physician, Dr. Whitfield Leisure  Date Time Provider Rocky Karimi   9/22/2017 1:25 PM Aura Chandra MD [facility] MAGDALENE SCHED           Current Medications after this visit[de-identified]       Current Outpatient Prescriptions   Medication Sig    pravastatin (PRAVACHOL) 40 mg tablet TAKE ONE TABLET BY MOUTH AT NIGHT ON MONDAY, [facility] & FRIDAY  Indications: hyperlipidemia    senna (SENNA) 8.6 mg tablet Take 1 Tab by mouth daily.  amLODIPine (NORVASC) 2.5 mg tablet TAKE ONE TABLET BY MOUTH EVERY DAY    cyanocobalamin (VITAMIN B-12) 1,000 mcg tablet Take 1,000 mcg by mouth daily.  levothyroxine (SYNTHROID) 150 mcg tablet Take 1 Tab by mouth Daily (before breakfast).  ezetimibe (ZETIA) 10 mg tablet TAKE ONE TABLET BY MOUTH EVERY DAY    fluticasone (FLONASE) 50 mcg/actuation nasal spray 2 Sprays by Both Nostrils route daily.  avanafil (STENDRA) 100 mg tablet Take  by mouth as needed for Other.  omega-3 fatty acids-vitamin e (FISH OIL) 1,000 mg Cap Take 1 Cap by mouth two (2) times a day.  magnesium oxide (MAG-OXIDE) 400 mg tablet Take 400 mg by mouth daily.  aspirin-dipyridamole (AGGRENOX)  mg per SR capsule Take 1 Cap by mouth two (2) times a day.  [CANCELED] magnesium oxide (MAG-OXIDE) 400 mg tablet Take 1 Tab by mouth daily. No current facility-administered medications for this visit.

## 2017-05-27 LAB
ALBUMIN SERPL-MCNC: 4.1 G/DL (ref 3.6–4.8)
ALBUMIN/GLOB SERPL: 1.3 {RATIO} (ref 1.2–2.2)
ALP SERPL-CCNC: 82 IU/L (ref 39–117)
ALT SERPL-CCNC: 15 IU/L (ref 0–44)
AST SERPL-CCNC: 20 IU/L (ref 0–40)
BILIRUB SERPL-MCNC: 0.5 MG/DL (ref 0–1.2)
BUN SERPL-MCNC: 11 MG/DL (ref 8–27)
BUN/CREAT SERPL: 16 (ref 10–24)
CALCIUM SERPL-MCNC: 9.8 MG/DL (ref 8.6–10.2)
CHLORIDE SERPL-SCNC: 104 MMOL/L (ref 96–106)
CO2 SERPL-SCNC: 22 MMOL/L (ref 18–29)
CREAT SERPL-MCNC: 0.68 MG/DL (ref 0.76–1.27)
CRP SERPL HS-MCNC: 4.57 MG/L (ref 0–3)
ERYTHROCYTE [DISTWIDTH] IN BLOOD BY AUTOMATED COUNT: 13.4 % (ref 12.3–15.4)
ERYTHROCYTE [SEDIMENTATION RATE] IN BLOOD BY WESTERGREN METHOD: 7 MM/HR (ref 0–30)
GLOBULIN SER CALC-MCNC: 3.2 G/DL (ref 1.5–4.5)
GLUCOSE SERPL-MCNC: 94 MG/DL (ref 65–99)
HCT VFR BLD AUTO: 37.5 % (ref 37.5–51)
HGB BLD-MCNC: 12.2 G/DL (ref 12.6–17.7)
MCH RBC QN AUTO: 29.3 PG (ref 26.6–33)
MCHC RBC AUTO-ENTMCNC: 32.5 G/DL (ref 31.5–35.7)
MCV RBC AUTO: 90 FL (ref 79–97)
PLATELET # BLD AUTO: 274 X10E3/UL (ref 150–379)
POTASSIUM SERPL-SCNC: 4.5 MMOL/L (ref 3.5–5.2)
PROT SERPL-MCNC: 7.3 G/DL (ref 6–8.5)
RBC # BLD AUTO: 4.17 X10E6/UL (ref 4.14–5.8)
SODIUM SERPL-SCNC: 141 MMOL/L (ref 134–144)
TSH SERPL DL<=0.005 MIU/L-ACNC: 1.05 UIU/ML (ref 0.45–4.5)
WBC # BLD AUTO: 4.4 X10E3/UL (ref 3.4–10.8)

## 2017-05-28 NOTE — PROGRESS NOTES
I saw and evaluated the patient idependently, performing the key elements of the exam and service. I discussed the findings, assessment and plan with the resident and agree with the resident's findings and plan as documented in the resident's note. Fernando Stone M.D.

## 2017-05-31 NOTE — PROGRESS NOTES
Normal CBC. Normal kidney and liver function. Normal thyroid. Mild elevation in CRP but normal ESR unlikely to be an acute inflammatory condition.

## 2017-07-20 DIAGNOSIS — E78.00 PURE HYPERCHOLESTEROLEMIA: ICD-10-CM

## 2017-07-20 RX ORDER — EZETIMIBE 10 MG
TABLET ORAL
Qty: 90 TAB | Refills: 0 | Status: SHIPPED | OUTPATIENT
Start: 2017-07-20 | End: 2018-04-20 | Stop reason: ALTCHOICE

## 2017-09-01 DIAGNOSIS — E78.2 MIXED HYPERLIPIDEMIA: Chronic | ICD-10-CM

## 2017-09-01 DIAGNOSIS — E78.00 PURE HYPERCHOLESTEROLEMIA: ICD-10-CM

## 2017-09-02 RX ORDER — PRAVASTATIN SODIUM 40 MG/1
TABLET ORAL
Qty: 12 TAB | Refills: 5 | Status: SHIPPED | OUTPATIENT
Start: 2017-09-02 | End: 2017-09-07 | Stop reason: SDUPTHER

## 2017-09-18 ENCOUNTER — PATIENT OUTREACH (OUTPATIENT)
Dept: FAMILY MEDICINE CLINIC | Age: 67
End: 2017-09-18

## 2017-09-18 NOTE — PROGRESS NOTES
This episode closed: Mounika Gama 3/24-28/2017: Right upper extremity weakness , underwent C3-C7 anterior cervical diskectomy and arthrodesis on 3/13/2017 and was discharged home 3/16/2017. Patient was readmitted to [facility] and evaluation duplex in the right arm demonstrated deep and superficial venous thrombosis. Patient attended follow up appointments with [facility]. Patient discontinued PT/OT. Patient stopped in September. Patient continues with exercises. Patient stated right arm is the same as before surgery but left arm is slower to come back. Patient continues with patient in back and legs for last 10 years.   Patient has appointment with PCP 9/22/2017.    5/27/2017  5:39 AM - Tim, [facility] Lab Results In   Component Results   Component Value Flag Ref Range Units Status   WBC 4.4  3.4 - 10.8 x10E3/uL Final   RBC 4.17  4.14 - 5.80 x10E6/uL Final   HGB 12.2 (L) 12.6 - 17.7 g/dL Final   HCT 37.5  37.5 - 51.0 % Final   MCV 90  79 - 97 fL Final   MCH 29.3  26.6 - 33.0 pg Final   MCHC 32.5  31.5 - 35.7 g/dL Final   RDW 13.4  12.3 - 15.4 % Final   PLATELET 255  046 - 823 x10E3/uL Final   5/27/2017  5:39 AM - Tim, [facility] Lab Results In   Component Results   Component Value Flag Ref Range Units Status   C-Reactive Protein, Cardiac 4.57 (H) 0.00 - 3.00 mg/L Final   Comment:            Relative Risk for Future Cardiovascular Event                                Low                 <1.00                                Average       1.00 - 3.00                                High                >3.00    5/27/2017  5:39 AM - Tim, [facility] Lab Results In   Component Results   Component Value Flag Ref Range Units Status   Glucose 94  65 - 99 mg/dL Final   BUN 11  8 - 27 mg/dL Final   Creatinine 0.68 (L) 0.76 - 1.27 mg/dL Final   GFR est non-  >59 mL/min/1.73 Final   GFR est   >59 mL/min/1.73 Final   BUN/Creatinine ratio 16  10 - 24  Final   Sodium 141  134 - 144 mmol/L Final   Potassium 4.5  3.5 - 5.2 mmol/L Final Chloride 104  96 - 106 mmol/L Final   CO2 22  18 - 29 mmol/L Final   Calcium 9.8  8.6 - 10.2 mg/dL Final   Protein, total 7.3  6.0 - 8.5 g/dL Final   Albumin 4.1  3.6 - 4.8 g/dL Final   GLOBULIN, TOTAL 3.2  1.5 - 4.5 g/dL Final   A-G Ratio 1.3  1.2 - 2.2  Final   Bilirubin, total 0.5  0.0 - 1.2 mg/dL Final   Alk. phosphatase 82  39 - 117 IU/L Final   AST (SGOT) 20  0 - 40 IU/L Final   ALT (SGPT) 15  0 - 44 IU/L Final     Result Information      Status Provider Status        Final result (Collected: 6/16/2012  9:17 AM) Reviewed        Result Narrative      **Final Report**       ICD Codes / Adm. Diagnosis: 724.02  272.2 / Spinal stenosis, lumbar region    Examination: Pam Mcdermott SPINE  CON  - [id number] - Jun 16 2012  9:19AM  Accession No:  [id number]  Reason:  stenosis      REPORT:  CLINICAL HISTORY: Pain    INDICATION: Lower back pain    COMPARISON: None    TECHNIQUE: MR imaging of the lumbar spine was performed with sagittal T1,   T2, STIR;  axial T1, T2.  Contrast was not administered. FINDINGS:    Congenitally narrow spinal canal.. Vertebral body heights are maintained. Discogenic marrow degenerative changes. Redundancy of the cauda equina at   L3-L4. .  The conus medullaris terminates at L2.     L1/2:  The spinal canal and neuroforamina are widely patent. L2/3:  The spinal canal and neuroforamina are widely patent. L3/4:  Facet arthropathy and hypertrophy. Ligamentum flavum hypertrophy. Discogenic marrow degenerative changes. Anterior disc osteophyte. There is a   moderate central, left paracentral and left foraminal disc protrusion   present. There is severe central canal stenosis. There is severe at left   lateral recess stenosis. There is severe left neural foraminal stenosis. L4/5:  Facet arthropathy and hypertrophy. Ligamentum flavum hypertrophy. Disc bulge. Moderate central canal stenosis. Moderate left foraminal   stenosis. Mild right foraminal stenosis.      L5/S1:  Facet arthropathy and hypertrophy. Discogenic marrow degenerative   changes. Mild transverse disc protrusion. There is severe right and mild   left foraminal stenosis. Central canal is patent. The visualized musculature and intraperitoneal structures are within normal   limits        IMPRESSION:  Multilevel severe disc and facet degenerative drain superimposed on a   congenitally narrow spinal canal.    There is a moderate disc protrusion at L3-L4 with severe central canal   stenosis. There is severe left lateral recess and severe left neural foraminal   stenosis at L3-L4. Moderate central canal stenosis and left foraminal stenosis at L4-L5.     Severe right foraminal stenosis at L5-S1            Signing/Reading Doctor: Didier Soto ([id number])    Approved: EFRAIN SHELLEY ([id number])  06/18/2012

## 2017-09-22 ENCOUNTER — OFFICE VISIT (OUTPATIENT)
Dept: FAMILY MEDICINE CLINIC | Age: 67
End: 2017-09-22

## 2017-09-22 VITALS
HEIGHT: 71 IN | TEMPERATURE: 98.4 F | BODY MASS INDEX: 29.65 KG/M2 | HEART RATE: 73 BPM | WEIGHT: 211.8 LBS | OXYGEN SATURATION: 94 % | DIASTOLIC BLOOD PRESSURE: 85 MMHG | RESPIRATION RATE: 16 BRPM | SYSTOLIC BLOOD PRESSURE: 123 MMHG

## 2017-09-22 DIAGNOSIS — Z13.39 ALCOHOL SCREENING: ICD-10-CM

## 2017-09-22 DIAGNOSIS — E03.4 HYPOTHYROIDISM DUE TO ACQUIRED ATROPHY OF THYROID: Chronic | ICD-10-CM

## 2017-09-22 DIAGNOSIS — M50.30 DDD (DEGENERATIVE DISC DISEASE), CERVICAL: Primary | Chronic | ICD-10-CM

## 2017-09-22 DIAGNOSIS — Z86.73 HISTORY OF CVA (CEREBROVASCULAR ACCIDENT): ICD-10-CM

## 2017-09-22 DIAGNOSIS — Z23 ENCOUNTER FOR IMMUNIZATION: ICD-10-CM

## 2017-09-22 DIAGNOSIS — E78.2 MIXED HYPERLIPIDEMIA: Chronic | ICD-10-CM

## 2017-09-22 DIAGNOSIS — G54.0 BRACHIAL PLEXITIS: Chronic | ICD-10-CM

## 2017-09-22 DIAGNOSIS — Z00.00 MEDICARE ANNUAL WELLNESS VISIT, SUBSEQUENT: ICD-10-CM

## 2017-09-22 DIAGNOSIS — I10 ESSENTIAL HYPERTENSION: Chronic | ICD-10-CM

## 2017-09-22 DIAGNOSIS — E55.9 VITAMIN D DEFICIENCY: ICD-10-CM

## 2017-09-22 DIAGNOSIS — Z13.31 DEPRESSION SCREENING: ICD-10-CM

## 2017-09-22 DIAGNOSIS — Z13.5 GLAUCOMA SCREENING: ICD-10-CM

## 2017-09-22 DIAGNOSIS — M54.16 LUMBAR RADICULOPATHY: ICD-10-CM

## 2017-09-22 RX ORDER — LANOLIN ALCOHOL/MO/W.PET/CERES
400 CREAM (GRAM) TOPICAL DAILY
COMMUNITY
End: 2022-02-08

## 2017-09-22 NOTE — MR AVS SNAPSHOT
Visit Information Date & Time Provider Department Dept. Phone Encounter #  
 9/22/2017  1:25 PM Joel Candelario MD [address] [place] [id number] Follow-up Instructions Return in about 4 months (around 1/22/2018). Your Appointments 9/29/2017  9:00 AM  
COMPLETE PHYSICAL with Joel Candelario MD  
[facility] Appt Note: 6th month f/u  
 [address] [address]  
[place] [address] [address] Upcoming Health Maintenance Date Due ZOSTER VACCINE AGE 60> 1/18/2012 GLAUCOMA SCREENING Q2Y 3/18/2017 MEDICARE YEARLY EXAM 9/23/2018 Pneumococcal 65+ High/Highest Risk (2 of 2 - PPSV23) 9/1/2020 DTaP/Tdap/Td series (2 - Td) 11/11/2024 COLONOSCOPY 10/4/2026 Allergies as of 9/22/2017  Review Complete On: 9/22/2017 By: Brandie Roberson LPN Severity Noted Reaction Type Reactions Lipitor [Atorvastatin]  04/27/2010    Other (comments) Myalgia Simvastatin  04/27/2010    Other (comments)  
 myalgia Current Immunizations  Reviewed on 1/4/2016 Name Date Influenza Vaccine 10/23/2013 Influenza Vaccine (Quad) PF 9/21/2016, 10/16/2015 Influenza Vaccine Split 10/26/2012, 10/21/2011, 11/10/2010, 12/3/2009 Pneumococcal Conjugate (PCV-13) 11/30/2016 Pneumococcal Polysaccharide (PPSV-23) 9/1/2015 Tdap 11/11/2014 Not reviewed this visit You Were Diagnosed With   
  
 Codes Comments DDD (degenerative disc disease), cervical    -  Primary ICD-10-CM: M50.30 ICD-9-CM: 722.4 Brachial plexitis     ICD-10-CM: G54.0 ICD-9-CM: 353.0 Vitamin D deficiency     ICD-10-CM: E55.9 ICD-9-CM: 268.9 Hypothyroidism due to acquired atrophy of thyroid     ICD-10-CM: E03.4 ICD-9-CM: 244.8, 246.8 Essential hypertension     ICD-10-CM: I10 
ICD-9-CM: 401.9 History of CVA (cerebrovascular accident)     ICD-10-CM: Z86.73 
ICD-9-CM: V12.54 Mixed hyperlipidemia     ICD-10-CM: E78.2 ICD-9-CM: 272.2 Lumbar radiculopathy     ICD-10-CM: M54.16 
ICD-9-CM: 724.4 Glaucoma screening     ICD-10-CM: Z13.5 ICD-9-CM: V80.1 Vitals BP Pulse Temp Resp Height(growth percentile) Weight(growth percentile) 123/85 (BP 1 Location: Right arm, BP Patient Position: Sitting) 73 98.4 °F (36.9 °C) (Oral) 16 5' 11\" (1.803 m) 211 lb 12.8 oz (96.1 kg) SpO2 BMI Smoking Status 94% 29.54 kg/m2 Former Smoker BMI and BSA Data Body Mass Index Body Surface Area  
 29.54 kg/m 2 2.19 m 2 Preferred Pharmacy Pharmacy Name Phone [address] [place], [place] - [address] [phone] Your Updated Medication List  
  
   
This list is accurate as of: 9/22/17  2:11 PM.  Always use your most recent med list. amLODIPine 2.5 mg tablet Commonly known as:  Harshal Anaid TAKE ONE TABLET BY MOUTH EVERY DAY  
  
 aspirin-dipyridamole  mg per SR capsule Commonly known as:  AGGRENOX Take 1 Cap by mouth two (2) times a day. avanafil 100 mg tablet Commonly known as:  ZVYDJII Take  by mouth as needed for Other. FISH OIL 1,000 mg Cap Generic drug:  omega-3 fatty acids-vitamin e Take 1 Cap by mouth two (2) times a day. fluticasone 50 mcg/actuation nasal spray Commonly known as:  Marine Knack 2 Sprays by Both Nostrils route daily. levothyroxine 150 mcg tablet Commonly known as:  SYNTHROID Take 1 Tab by mouth Daily (before breakfast). * MAG-OXIDE 400 mg tablet Generic drug:  magnesium oxide Take 400 mg by mouth daily. * magnesium oxide 400 mg tablet Commonly known as:  MAG-OX Take 400 mg by mouth daily. * magnesium oxide 400 mg tablet Commonly known as:  MAG-OXIDE Take 1 Tab by mouth daily. pravastatin 40 mg tablet Commonly known as:  PRAVACHOL  
TAKE ONE TABLET BY MOUTH AT NIGHT ON MONDAY, [facility] & FRIDAY Indications: hyperlipidemia Senna 8.6 mg tablet Generic drug:  senna Take 1 Tab by mouth daily. VITAMIN B-12 1,000 mcg tablet Generic drug:  cyanocobalamin Take 1,000 mcg by mouth daily. ZETIA 10 mg tablet Generic drug:  ezetimibe TAKE ONE TABLET BY MOUTH EVERY DAY  
  
 * Notice: This list has 3 medication(s) that are the same as other medications prescribed for you. Read the directions carefully, and ask your doctor or other care provider to review them with you. We Performed the Following AMB POC FUNDUS PHOTOGRAPHY WITH INTERP AND REPORT [21925 CPT(R)] LIPID PANEL [51900 CPT(R)] METABOLIC PANEL, COMPREHENSIVE [91121 CPT(R)] T4, FREE C3488494 CPT(R)] TSH 3RD GENERATION [62068 CPT(R)] VITAMIN D, 25 HYDROXY [id number] CPT(R)] Follow-up Instructions Return in about 4 months (around 1/22/2018). Patient Instructions Medicare Part B Preventive Services Guidelines/Limitations Date last completed and Frequency Due Date Cardiovascular Screening Blood Tests (every 5 years) Total cholesterol, HDL, Triglycerides Order as a panel if possible Completed 1/26/17 As recommended by your PCP As recommended by your PCP or Specialist  
Colorectal Cancer Screening 
-Fecal occult blood test (annual) -Flexible sigmoidoscopy (5y) 
-Screening colonoscopy (10y) -Barium Enema Age 49-80; After age [de-identified] if history of abnormal results Completed 10/4/16 Recommended every 5 to 10 years  As recommended by your PCP or Specialist 
  
Counseling to Prevent Tobacco Use (up to 8 sessions per year) - Counseling greater than 3 and up to 10 minutes - Counseling greater than 10 minutes Patients must be asymptomatic of tobacco-related conditions to receive as preventive service N/A N/A Diabetes Screening Tests (at least every 3 years, Medicare covers annually or at 6-month intervals for prediabetic patients) Fasting blood sugar (FBS) or glucose tolerance test (GTT) Patient must be diagnosed with one of the following: 
-Hypertension, Dyslipidemia, obesity, previous impaired FBS or GTT 
Or any two of the following: overweight, FH of diabetes, age ? 72, history of gestational diabetes, birth of baby weighing more than 9 pounds Completed 5/27/17 Recommended every 3 years for non-diabetics Recommended every 3-6 months for Pre-Diabetics and Diabetics As recommended by your PCP or Specialist 
  
Glaucoma Screening (no USPSTF recommendation) Diabetes mellitus, family history, , age 48 or over,  American, age 72 or over Recommended annually As recommended by your PCP or Specialist 
[address] Prostate Cancer Screening (annually up to age 76) - Digital rectal exam (BETH) - Prostate specific antigen (PSA) Annually (age 48 or over), BETH not paid separately when covered E/M service is provided on same date Recommended annually to age 76 As recommended by your PCP or Specialist 
  
Seasonal Influenza Vaccination (annually)  Completed Recommended Annually Due Fall 2017 TDAP Vaccination  Completed 11/11/14 Recommended every 10 years As recommended by your PCP or Specialist  
Zoster (Shingles) Vaccination Covered by Medicare Part D through the pharmacy- PCP provides prescription Recommended once over age 48  As recommended by your PCP or Specialist  
  
Pneumococcal Vaccination (once after 72)  Pneumo 23- Recommended once over the age of 72 Prevnar 13-  Recommended once over the age of 72 Completed 11/30/16 Screening Mammography (biennial age 54-69) Annually (age 36 or over) Completed  As recommended by your PCP or Specialist 
  
Screening Pap Tests and Pelvic Examination (up to age 79 and after 79 if unknown history or abnormal study last 10 years) Every 24 months except high risk As recommended by your PCP or Specialist 
 As recommended by your PCP or Specialist 
  
Ultrasound Screening for Abdominal Aortic Aneurysm (AAA) (once) Patient must be referred through IPPE and not have had a screening for abdominal aortic aneurysm before under Medicare. Limited to patients who meet one of the following criteria: 
- Men who are 73-68 years old and have smoked more than 100 cigarettes in their lifetime. 
-Anyone with a FH of AAA 
-Anyone recommended for screening by USPSTF Not indicated unless recommended by PCP Not indicated unless recommended by PCP Family Practice Management 2011 Introducing [facility]! Dear Karri Soto: Thank you for requesting a Keep Me Certified account. Our records indicate that you have previously registered for a Keep Me Certified account but its currently inactive. Please call our Keep Me Certified support line at [phone]. Additional Information If you have questions, please visit the Frequently Asked Questions section of the Keep Me [employer] website at [email or web address] Remember, Keep Me Certified is NOT to be used for urgent needs. For medical emergencies, dial 911. Now available from your iPhone and Android! Please provide this summary of care documentation to your next provider. Your primary care clinician is listed as [address]. If you have any questions after today's visit, please call [phone].

## 2017-09-22 NOTE — PROGRESS NOTES
Chief Complaint   Patient presents with    Annual Wellness Visit     Body mass index is 29.54 kg/(m^2).       Reviewed record in preparation for visit and have necessary documentation  Pt did not bring medication to office visit for review  Information was given to pt on Advanced Directives, Living Will  Information was given on Shingles Vaccine  Opportunity was given for questions  Goals that were addressed and/or need to be completed after this appointment include:     Health Maintenance Due   Topic Date Due    ZOSTER VACCINE AGE 60>  01/18/2012    GLAUCOMA SCREENING Q2Y  03/18/2017    MEDICARE YEARLY EXAM  03/18/2017    INFLUENZA AGE 9 TO ADULT  08/01/2017 Problem: Safety  Goal: Will remain free from injury  Patient remains free from injury.  Mother at bedside during night.  Patient notifies mother when she needs to void.  Mother utilizes call light appropriately and waits for assistance when getting patient up to bathroom.

## 2017-09-22 NOTE — PROGRESS NOTES
Progress Note    Patient: Nishant Sepulveda MRN: [id number]  SSN: [id number]    YOB: 1950  Age: 74 y.o. Sex: male        Chief Complaint   Patient presents with    Annual Wellness Visit         Subjective:     Encounter Diagnoses   Name Primary?  DDD (degenerative disc disease), cervical: He has not recovered use of his  left arm. He now has what appears to be a ruptured biceps on that side as well. It measured 5 cm in length so we will keep an eye on it size and do further studies if it appears to be enlarging. Yes    Brachial plexitis: The brachial plexitis in his right arm has completely resolved. He is still getting therapy on both arms.  Vitamin D deficiency:No sx. Due for testing. He was placed on vitamin D when he was on a prolonged prednisone taper. He has been off for over a month. No sx. Due for testing.  Hypothyroidism due to acquired atrophy of thyroid:  Lab Results   Component Value Date/Time    TSH 1.050 05/26/2017 10:12 AM    T4, Free 1.28 01/25/2017 09:19 AM    T4, Total 9.6 11/10/2010 01:21 AM      Denies fatigue, nervousness,weight changes, heat orcold intolerance, bowel changes,skin changes, cardiovascular symptoms, hair loss, feeling excessive energy, tremor, palpitations and weight loss. Thyroid medication has been unchanged since last medication check and labs.  Essential hypertension: Controlled  BP Readings from Last 3 Encounters:   09/22/17 123/85   05/26/17 145/85   04/14/17 128/83     The patient reports:  taking medications as instructed, no medication side effects noted, no TIA's, no chest pain on exertion, no dyspnea on exertion, no swelling of ankles.      Lab Results   Component Value Date/Time    Sodium 141 05/26/2017 10:12 AM    Potassium 4.5 05/26/2017 10:12 AM    Chloride 104 05/26/2017 10:12 AM    CO2 22 05/26/2017 10:12 AM    Anion gap 6 05/24/2011 02:28 PM    Glucose 94 05/26/2017 10:12 AM    BUN 11 05/26/2017 10:12 AM Creatinine 0.68 05/26/2017 10:12 AM    BUN/Creatinine ratio 16 05/26/2017 10:12 AM    GFR est  05/26/2017 10:12 AM    GFR est non- 05/26/2017 10:12 AM    Calcium 9.8 05/26/2017 10:12 AM    Bilirubin, total 0.5 05/26/2017 10:12 AM    AST (SGOT) 20 05/26/2017 10:12 AM    Alk. phosphatase 82 05/26/2017 10:12 AM    Protein, total 7.3 05/26/2017 10:12 AM    Albumin 4.1 05/26/2017 10:12 AM    Globulin 3.9 05/24/2011 02:28 PM    A-G Ratio 1.3 05/26/2017 10:12 AM    ALT (SGPT) 15 05/26/2017 10:12 AM     Our goal is to normalize the blood pressure to decrease the risks of strokes and heart attacks. The patient is in agreement with the plan.  History of CVA (cerebrovascular accident): No residual.         Mixed hyperlipidemia:  Cardiovascular risks for him are: LDL goal is under 100  hypertension  hyperlipidemia. Currently he takes Pravachol (pravastatin) , 40 mg. Lab Results   Component Value Date/Time    Cholesterol, total 169 01/25/2017 09:19 AM    HDL Cholesterol 57 01/25/2017 09:19 AM    LDL, calculated 97 01/25/2017 09:19 AM    Triglyceride 77 01/25/2017 09:19 AM    CHOL/HDL Ratio 3.9 11/10/2010 01:21 AM     Lab Results   Component Value Date/Time    ALT (SGPT) 15 05/26/2017 10:12 AM    AST (SGOT) 20 05/26/2017 10:12 AM    Alk. phosphatase 82 05/26/2017 10:12 AM    Bilirubin, total 0.5 05/26/2017 10:12 AM      Myalgias: No   Fatigue: No   Other side effects: no  Wt Readings from Last 3 Encounters:   09/22/17 211 lb 12.8 oz (96.1 kg)   05/26/17 204 lb (92.5 kg)   04/14/17 202 lb (91.6 kg)     The patient is aware of our goal to reduce or eliminate the long term problems (such as strokes and heart attacks) related to poorly controlled hyperlipidemia.  Lumbar radiculopathy: Chronic low back pain primarily involving his right leg L4-L5 is taking 2.5 mg all other days that he held it last night.  Glaucoma screening: No symptoms.        Encounter for immunization refused flu vaccine.: We will make an appointment where he c which is treated him over the phone urrent and past medical information:    Current Medications after this visit[de-identified]   Current Outpatient Prescriptions   Medication Sig    magnesium oxide (MAG-OX) 400 mg tablet Take 400 mg by mouth daily.  pravastatin (PRAVACHOL) 40 mg tablet TAKE ONE TABLET BY MOUTH AT NIGHT ON MONDAY, [facility] & FRIDAY Indications: hyperlipidemia    ZETIA 10 mg tablet TAKE ONE TABLET BY MOUTH EVERY DAY    senna (SENNA) 8.6 mg tablet Take 1 Tab by mouth daily.  aspirin-dipyridamole (AGGRENOX)  mg per SR capsule Take 1 Cap by mouth two (2) times a day.  amLODIPine (NORVASC) 2.5 mg tablet TAKE ONE TABLET BY MOUTH EVERY DAY    cyanocobalamin (VITAMIN B-12) 1,000 mcg tablet Take 1,000 mcg by mouth daily.  levothyroxine (SYNTHROID) 150 mcg tablet Take 1 Tab by mouth Daily (before breakfast).  omega-3 fatty acids-vitamin e (FISH OIL) 1,000 mg Cap Take 1 Cap by mouth two (2) times a day.  magnesium oxide (MAG-OXIDE) 400 mg tablet Take 400 mg by mouth daily.  fluticasone (FLONASE) 50 mcg/actuation nasal spray 2 Sprays by Both Nostrils route daily.  avanafil (STENDRA) 100 mg tablet Take  by mouth as needed for Other.  [CANCELED] magnesium oxide (MAG-OXIDE) 400 mg tablet Take 1 Tab by mouth daily. No current facility-administered medications for this visit.         Patient Active Problem List    Diagnosis Date Noted    Mixed hyperlipidemia 04/27/2010     Priority: 1 - One    Benign neoplasm of colon 04/27/2010     Priority: 1 - One    Tobacco use disorder 04/27/2010     Priority: 1 - One    Keloid 04/27/2010     Priority: 1 - One    Internal hemorrhoid 04/27/2010     Priority: 1 - One    Thyroglossal duct cyst 09/20/2010     Priority: 3 - Three    History of CVA (cerebrovascular accident) 05/22/2015    HTN (hypertension) 11/11/2014    Prostate cancer ([facility] [address] 04/17/2011    Hypothyroidism 09/25/2010    Boil 09/20/2010 Past Medical History:   Diagnosis Date    Benign neoplasm of colon 4/27/2010    Cancer ([facility])     PROSTATE    HTN (hypertension) 11/11/2014    Hypercholesterolemia     Internal hemorrhoid 4/27/2010    Keloid 4/27/2010    Mixed hyperlipidemia 4/27/2010    Neck mass 4/27/2010    Other ill-defined conditions     HI CHOLESTEROL    Prostate cancer ([facility]) 4/17/2011    Smoker     Stroke [facility])     Per pt 2014    Thyroid disease     HYPO    Tobacco use disorder 4/27/2010       Allergies   Allergen Reactions    Lipitor [Atorvastatin] Other (comments)     Myalgia      Simvastatin Other (comments)     myalgia         Past Surgical History:   Procedure Laterality Date    HX GI      COLONOSCOPY    HX OTHER SURGICAL      KELOIDS--BACK    HX PROSTATECTOMY      PROSTATE BIOPSY    HX SPLENECTOMY  1969    adhesions----1997       Social History     Social History    Marital status:      Spouse name: N/A    Number of children: N/A    Years of education: N/A     Social History Main Topics    Smoking status: Former Smoker     Packs/day: 0.25     Years: 40.00     Types: Cigarettes     Quit date: 2/9/2015    Smokeless tobacco: Never Used      Comment: smokes 1/2 pack week    Alcohol use 3.0 oz/week     6 Cans of beer per week      Comment: moderately    Drug use: No    Sexual activity: Yes     Other Topics Concern    None     Social History Narrative       Review of Systems   Constitutional: Negative. Negative for chills, fever, malaise/fatigue and weight loss. HENT: Negative. Negative for hearing loss. Eyes: Negative. Negative for blurred vision and double vision. Respiratory: Negative. Negative for cough, hemoptysis, sputum production and shortness of breath. Cardiovascular: Negative. Negative for chest pain, palpitations and orthopnea. Gastrointestinal: Negative. Negative for abdominal pain, blood in stool, heartburn, nausea and vomiting. Genitourinary: Negative.   Negative for dysuria, frequency and urgency. Musculoskeletal: Positive for back pain and joint pain. Negative for myalgias and neck pain. Cervical disc disease-status post fusion with persistent disuse syndrome of his left arm and what appears to be ruptured biceps muscle on the left as well. Skin: Negative. Negative for rash. Neurological: Negative. Negative for dizziness, tingling, tremors, weakness and headaches. Endo/Heme/Allergies: Negative. Psychiatric/Behavioral: Negative. Negative for depression. Objective:     Vitals:    09/22/17 1332   BP: 123/85   Pulse: 73   Resp: 16   Temp: 98.4 °F (36.9 °C)   TempSrc: Oral   SpO2: 94%   Weight: 211 lb 12.8 oz (96.1 kg)   Height: 5' 11\" (1.803 m)      Body mass index is 29.54 kg/(m^2). Physical Exam   Constitutional: He is oriented to person, place, and time and well-developed, well-nourished, and in no distress. HENT:   Head: Normocephalic and atraumatic. Mouth/Throat: Oropharynx is clear and moist.   Eyes: Right eye exhibits no discharge. Left eye exhibits no discharge. No scleral icterus. Neck: No tracheal deviation present. No thyromegaly present. No bruit. Cardiovascular: Normal rate, regular rhythm and normal heart sounds. Pulmonary/Chest: Effort normal and breath sounds normal.   Abdominal: Soft. Musculoskeletal:   Cervical disc disease-status post fusion with persistent disuse syndrome of his left arm and what appears to be ruptured biceps muscle on the left as well. L4-L5 radiculopathy on exam   Neurological: He is alert and oriented to person, place, and time. Skin: No rash noted. No erythema. Psychiatric: Mood and affect normal.   Nursing note and vitals reviewed. Health Maintenance Due   Topic Date Due    ZOSTER VACCINE AGE 60>  01/18/2012    GLAUCOMA SCREENING Q2Y  03/18/2017         Assessment and orders:       ICD-10-CM ICD-9-CM    1.  DDD (degenerative disc disease), cervical-persistent with neuralgia and disuse of his left arm     M50.30 722.4    2. Brachial plexitis-Resolved   G54.0 353.0    3. Vitamin D deficiency-due to steroid use, recheck   E55.9 268.9 VITAMIN D, 25 HYDROXY   4. Hypothyroidism due to acquired atrophy of thyroid-recheck labs E03.4 244.8 T4, FREE     246.8 TSH 3RD GENERATION   5. Essential hypertension-well-controlled I10 401.9 magnesium oxide (MAG-OX) 400 mg tablet      LIPID PANEL      METABOLIC PANEL, COMPREHENSIVE   6. History of CVA (cerebrovascular accident)-no visible residual   Z86.73 V12.54    7. Mixed hyperlipidemia-retest   E78.2 272.2 LIPID PANEL   8. Lumbar radiculopathy-chronic pain   M54.16 724.4    9. Glaucoma screening-fundus exam Z13.5 V80.1 AMB POC FUNDUS PHOTOGRAPHY WITH INTERP AND REPORT   10. Encounter for immunization-changed his mind and refused Z23 V03.89 ADMIN INFLUENZA VIRUS VAC      CANCELED: INFLUENZA VIRUS VACCINE, HIGH DOSE SEASONAL, PRESERVATIVE FREE         Plan of care:  Discussed diagnoses in detail with patient. Medication risks/benefits/side effects discussed with patient. All of the patient's questions were addressed. The patient understands and agrees with our plan of care. The patient knows to call back if they are unsure of or forget any changes we discussed today or if the symptoms change. The patient received an After-Visit Summary which contains VS, orders, medication list and allergy list. This can be used as a \"mini-medical record\" should they have to seek medical care while out of town. Patient Care Team:  Gela Kaplan MD as PCP - General (Family Practice)  Danielle Olivarez MD (Orthopedic Surgery)  Carol Smtih MD as Physician (Gastroenterology)  Zelda Gutierres, RN as Ambulatory Care Navigator    Follow-up Disposition:  Return in about 4 months (around 1/22/2018).     Future Appointments  Date Time Provider Rocky Karimi   9/29/2017 8:30 AM [facility] MAGDAL[place] SCHED   1/23/2018 8:00 AM Gela Kaplan MD [facility] Signed By: Dann Bautista MD     September 22, 2017

## 2017-09-22 NOTE — PATIENT INSTRUCTIONS
Medicare Part B Preventive Services Guidelines/Limitations Date last completed and Frequency Due Date   Cardiovascular Screening Blood Tests (every 5 years)  Total cholesterol, HDL, Triglycerides Order as a panel if possible Completed   1/26/17  As recommended by your PCP As recommended by your PCP or Specialist   Colorectal Cancer Screening  -Fecal occult blood test (annual)  -Flexible sigmoidoscopy (5y)  -Screening colonoscopy (10y)  -Barium Enema Age 49-80; After age [de-identified] if history of abnormal results Completed    10/4/16  Recommended every 5 to 10 years  As recommended by your PCP or Specialist     Counseling to Prevent Tobacco Use (up to 8 sessions per year)  - Counseling greater than 3 and up to 10 minutes  - Counseling greater than 10 minutes Patients must be asymptomatic of tobacco-related conditions to receive as preventive service N/A N/A   Diabetes Screening Tests (at least every 3 years, Medicare covers annually or at 6-month intervals for prediabetic patients)    Fasting blood sugar (FBS) or glucose tolerance test (GTT) Patient must be diagnosed with one of the following:  -Hypertension, Dyslipidemia, obesity, previous impaired FBS or GTT  Or any two of the following: overweight, FH of diabetes, age ? 72, history of gestational diabetes, birth of baby weighing more than 9 pounds Completed     5/27/17    Recommended every 3 years for non-diabetics    Recommended every 3-6 months for Pre-Diabetics and Diabetics As recommended by your PCP or Specialist     Glaucoma Screening (no USPSTF recommendation) Diabetes mellitus, family history, , age 48 or over,  American, age 72 or over     Recommended annually As recommended by your PCP or Specialist  [address] (annually up to age 76)  - Digital rectal exam (BETH)  - Prostate specific antigen (PSA) Annually (age 48 or over), BETH not paid separately when covered E/M service is provided on same date     Recommended annually to age 76 As recommended by your PCP or Specialist     Seasonal Influenza Vaccination (annually)  Completed   Recommended Annually Due Fall 2017   TDAP Vaccination  Completed   11/11/14  Recommended every 10 years As recommended by your PCP or Specialist   Zoster (Shingles) Vaccination Covered by Medicare Part D through the pharmacy- PCP provides prescription     Recommended once over age 48  As recommended by your PCP or Specialist      Pneumococcal Vaccination (once after 72)  Pneumo 23-   Recommended once over the age of 72    Prevnar 15-  Recommended once over the age of 72 Completed  11/30/16         Screening Mammography (biennial age 54-69) Annually (age 36 or over) Completed    As recommended by your PCP or Specialist     Screening Pap Tests and Pelvic Examination (up to age 79 and after 79 if unknown history or abnormal study last 8 years) Every 25 months except high risk As recommended by your PCP or Specialist   As recommended by your PCP or Specialist     Ultrasound Screening for Abdominal Aortic Aneurysm (AAA) (once) Patient must be referred through IPPE and not have had a screening for abdominal aortic aneurysm before under Medicare. Limited to patients who meet one of the following criteria:  - Men who are 73-68 years old and have smoked more than 100 cigarettes in their lifetime.  -Anyone with a FH of AAA  -Anyone recommended for screening by USPSTF Not indicated unless recommended by PCP   Not indicated unless recommended by PCP     Family Practice Management 2011    Medicare Wellness Visit, Male    The best way to live healthy is to have a healthy lifestyle by eating a well-balanced diet, exercising regularly, limiting alcohol and stopping smoking. Regular physical exams and screening tests are another way to keep healthy. Preventive exams provided by your health care provider can find health problems before they become diseases or illnesses.  Preventive services including immunizations, screening tests, monitoring and exams can help you take care of your own health. All people over age 72 should have a pneumovax  and and a prevnar shot to prevent pneumonia. These are once in a lifetime unless you and your provider decide differently. All people over 65 should have a yearly flu shot and a tetanus vaccine every 10 years. Screening for diabetes mellitus with a blood sugar test should be done every year. Glaucoma is a disease of the eye due to increased ocular pressure that can lead to blindness and it should be done every year by an eye professional.    Cardiovascular screening tests that check for elevated lipids (fatty part of blood) which can lead to heart disease and strokes should be done every 5 years. Colorectal screening that evaluates for blood or polyps in your colon should be done yearly as a stool test or every five years as a flexible sigmoidoscope or every 10 years as a colonoscopy up to age 76. Men up to age 76 may need a screening blood test for prostate cancer at certain intervals, depending on their personal and family history. This decision is between the patient and his provider. If you have been a smoker or had family history of abdominal aortic aneurysms, you and your provider may decide to schedule an ultrasound test of your aorta. Hepatitis C screening is also recommended for anyone born between [birth year] through [address]. A shingles vaccine is also recommended once in a lifetime after age 61. Your Medicare Wellness Exam is recommended annually.     Here is a list of your current Health Maintenance items with a due date:  Health Maintenance Due   Topic Date Due    Glaucoma Screening   03/18/2017

## 2017-09-22 NOTE — ACP (ADVANCE CARE PLANNING)
Patient states conversation about Advanced Medical Directive has been started, but nothing written down yet. RN encouraged patient to complete Advanced Medical Directive paperwork & bring a copy to the office for scanning into the medical record. Patient verbalized understanding & agreement.

## 2017-09-26 ENCOUNTER — TELEPHONE (OUTPATIENT)
Dept: FAMILY MEDICINE CLINIC | Age: 67
End: 2017-09-26

## 2017-09-26 NOTE — TELEPHONE ENCOUNTER
----- Message from Santosh Carmona MD sent at 9/26/2017 12:47 PM EDT -----  Call patient please-retinal scan is normal.  Repeat this in 1 year.   Dr. Herminio Matias

## 2017-09-26 NOTE — TELEPHONE ENCOUNTER
Phone call to patient. Informed patient per Dr. Dakota Quintero:     Call patient please-retinal scan is normal.  Repeat this in 1 year. Dr. Dakota Quintero      Patient verbalized understanding.

## 2017-09-30 LAB
25(OH)D3+25(OH)D2 SERPL-MCNC: 33.1 NG/ML (ref 30–100)
ALBUMIN SERPL-MCNC: 4.3 G/DL (ref 3.6–4.8)
ALBUMIN/GLOB SERPL: 1.5 {RATIO} (ref 1.2–2.2)
ALP SERPL-CCNC: 71 IU/L (ref 39–117)
ALT SERPL-CCNC: 12 IU/L (ref 0–44)
AST SERPL-CCNC: 19 IU/L (ref 0–40)
BILIRUB SERPL-MCNC: 0.5 MG/DL (ref 0–1.2)
BUN SERPL-MCNC: 15 MG/DL (ref 8–27)
BUN/CREAT SERPL: 21 (ref 10–24)
CALCIUM SERPL-MCNC: 9.8 MG/DL (ref 8.6–10.2)
CHLORIDE SERPL-SCNC: 105 MMOL/L (ref 96–106)
CHOLEST SERPL-MCNC: 163 MG/DL (ref 100–199)
CO2 SERPL-SCNC: 21 MMOL/L (ref 18–29)
CREAT SERPL-MCNC: 0.7 MG/DL (ref 0.76–1.27)
GLOBULIN SER CALC-MCNC: 2.9 G/DL (ref 1.5–4.5)
GLUCOSE SERPL-MCNC: 96 MG/DL (ref 65–99)
HDLC SERPL-MCNC: 52 MG/DL
LDLC SERPL CALC-MCNC: 97 MG/DL (ref 0–99)
POTASSIUM SERPL-SCNC: 4.3 MMOL/L (ref 3.5–5.2)
PROT SERPL-MCNC: 7.2 G/DL (ref 6–8.5)
SODIUM SERPL-SCNC: 142 MMOL/L (ref 134–144)
T4 FREE SERPL-MCNC: 1.27 NG/DL (ref 0.82–1.77)
TRIGL SERPL-MCNC: 70 MG/DL (ref 0–149)
TSH SERPL DL<=0.005 MIU/L-ACNC: 1.05 UIU/ML (ref 0.45–4.5)
VLDLC SERPL CALC-MCNC: 14 MG/DL (ref 5–40)

## 2017-10-03 DIAGNOSIS — I10 ESSENTIAL HYPERTENSION WITH GOAL BLOOD PRESSURE LESS THAN 140/90: Chronic | ICD-10-CM

## 2017-10-03 DIAGNOSIS — E03.4 HYPOTHYROIDISM DUE TO ACQUIRED ATROPHY OF THYROID: Chronic | ICD-10-CM

## 2017-10-03 RX ORDER — LEVOTHYROXINE SODIUM 150 MCG
TABLET ORAL
Qty: 30 TAB | Refills: 0 | Status: SHIPPED | OUTPATIENT
Start: 2017-10-03 | End: 2017-11-02 | Stop reason: SDUPTHER

## 2017-10-03 NOTE — TELEPHONE ENCOUNTER
Last office visit on 9/22/17 and last labs on 9/29/17. Please advise since Dr. Brittnee Martínez is out of the office. Thank you.

## 2017-10-11 ENCOUNTER — CLINICAL SUPPORT (OUTPATIENT)
Dept: FAMILY MEDICINE CLINIC | Age: 67
End: 2017-10-11

## 2017-10-11 ENCOUNTER — TELEPHONE (OUTPATIENT)
Dept: FAMILY MEDICINE CLINIC | Age: 67
End: 2017-10-11

## 2017-10-11 VITALS — BODY MASS INDEX: 29.54 KG/M2 | WEIGHT: 211 LBS | TEMPERATURE: 97.8 F | HEIGHT: 71 IN

## 2017-10-11 DIAGNOSIS — Z23 ENCOUNTER FOR IMMUNIZATION: Primary | ICD-10-CM

## 2017-10-11 NOTE — TELEPHONE ENCOUNTER
Pt would like a call back regarding medication (Zetia) and cramps in leg.  He thinks the med is the caused of it     Please call pt at [phone]

## 2017-10-12 NOTE — TELEPHONE ENCOUNTER
Attempted to reach patient by phone, unsuccessful. Received message below:     Pt would like a call back regarding medication (Zetia) and cramps in leg. He thinks the med is the caused of it. Please advise.

## 2017-10-12 NOTE — TELEPHONE ENCOUNTER
Returned phone call to patient. Informed patient per Dr. Fausto Fischer:    Tell him to stop taking the Zetia. We will reassess his lipids next visit. Patient verbalized understanding.

## 2017-10-16 ENCOUNTER — TELEPHONE (OUTPATIENT)
Dept: FAMILY MEDICINE CLINIC | Age: 67
End: 2017-10-16

## 2017-10-16 NOTE — TELEPHONE ENCOUNTER
Received call from [facility] Neurology resident. [place] [facility] [place]. They are inquiring about his anticoagulation medication history to figure out how best to treat him now. Based on chart review here it appears that he had a superficial thrombophlebitis that was treated with warfarin for 2 months and therapy stopped at which point he resumed his Aggranox. Per [facility] resident, pt has a h/o known unprovoked DVT as well during one of his prior admissions. He has never had a work-up for hypercoagulable states. They are planning to start him on anticoagulation and wanted to make sure his PCP is ok with that and that they are not missing any of the history. Will forward to pt's PCP who will be back in the office tomorrow but given the history this plan seems appropriate.

## 2017-10-17 NOTE — TELEPHONE ENCOUNTER
Phone call to [facility]. Transferred to Neurology department, left message on Keiry's voicemail for her to call [facility] regarding patient. Per Dr. Browne Miss: \"The patient has a history of the CVA as well as a superficial thrombophlebitis and his right arm after neck surgery. 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 He does not have a history of DVT as far as I know but he does have a history of prior stroke. Please contact the resident at [facility] and tell him that.    Dr. Browne Miss \"

## 2017-10-17 NOTE — TELEPHONE ENCOUNTER
Mary Ann Huizar returned my call. Mary Ann Huizar informed me that Dr. Slava Haynes is the attending and the resident is Antonio Royal who called and spoke with Dr. Clifford Sawyer. The number to [facility] direct line is [phone]. Mary Ann Huizar gave me three different numbers to try to get in touch with the Neurology resident. The first number: [phone] (Unable to take the call, went to voicemail.)  The second number to the  -- [phone]. Left message with the . The  paged Dr. Antonio Royal but states that he is going to be out of the office until Friday. Asked if I could speak with the attending or another resident who is caring for patient, informed that a nurse call our office. The third number went to [facility]'s voicemail -- [phone].

## 2017-10-17 NOTE — TELEPHONE ENCOUNTER
Received phone call from Yvonne, of the nurses in the [facility] Neurology department. Informed her per Dr. Teja Chowdhury: The patient has a history of the CVA as well as a superficial thrombophlebitis and his right arm after neck surgery. 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 He does not have a history of DVT as far as I know but he does have a history of prior stroke. [place] verbalized understanding and agreed to inform the residents/attending assigned to patients care.

## 2017-10-31 DIAGNOSIS — J30.2 OTHER SEASONAL ALLERGIC RHINITIS: ICD-10-CM

## 2017-10-31 NOTE — TELEPHONE ENCOUNTER
Last office visit on 9/22/2017 and last labs on 9/29/17. Please advise since Dr. Kacie Mariee is out of the office. Thank you.

## 2017-11-01 RX ORDER — FLUTICASONE PROPIONATE 50 MCG
SPRAY, SUSPENSION (ML) NASAL
Qty: 16 G | Refills: 0 | Status: SHIPPED | OUTPATIENT
Start: 2017-11-01 | End: 2018-03-21 | Stop reason: SDUPTHER

## 2017-12-04 DIAGNOSIS — E03.4 HYPOTHYROIDISM DUE TO ACQUIRED ATROPHY OF THYROID: Chronic | ICD-10-CM

## 2017-12-04 DIAGNOSIS — I10 ESSENTIAL HYPERTENSION WITH GOAL BLOOD PRESSURE LESS THAN 140/90: Chronic | ICD-10-CM

## 2017-12-04 RX ORDER — LEVOTHYROXINE SODIUM 150 MCG
TABLET ORAL
Qty: 30 TAB | Refills: 5 | Status: SHIPPED | OUTPATIENT
Start: 2017-12-04 | End: 2018-04-28 | Stop reason: SDUPTHER

## 2018-01-23 ENCOUNTER — OFFICE VISIT (OUTPATIENT)
Dept: FAMILY MEDICINE CLINIC | Age: 68
End: 2018-01-23

## 2018-01-23 VITALS
OXYGEN SATURATION: 98 % | DIASTOLIC BLOOD PRESSURE: 82 MMHG | RESPIRATION RATE: 18 BRPM | WEIGHT: 209 LBS | BODY MASS INDEX: 29.26 KG/M2 | HEART RATE: 64 BPM | HEIGHT: 71 IN | TEMPERATURE: 98.2 F | SYSTOLIC BLOOD PRESSURE: 137 MMHG

## 2018-01-23 DIAGNOSIS — I10 ESSENTIAL HYPERTENSION: Chronic | ICD-10-CM

## 2018-01-23 DIAGNOSIS — E78.00 PURE HYPERCHOLESTEROLEMIA: ICD-10-CM

## 2018-01-23 DIAGNOSIS — E78.2 MIXED HYPERLIPIDEMIA: Chronic | ICD-10-CM

## 2018-01-23 DIAGNOSIS — Z83.3 FAMILY HISTORY OF DIABETES MELLITUS: ICD-10-CM

## 2018-01-23 DIAGNOSIS — I63.9 CEREBELLAR STROKE (HCC): Primary | ICD-10-CM

## 2018-01-23 DIAGNOSIS — E03.4 HYPOTHYROIDISM DUE TO ACQUIRED ATROPHY OF THYROID: Chronic | ICD-10-CM

## 2018-01-23 RX ORDER — PRAVASTATIN SODIUM 40 MG/1
TABLET ORAL
Qty: 12 TAB | Refills: 5 | Status: SHIPPED | OUTPATIENT
Start: 2018-01-23 | End: 2018-08-11 | Stop reason: SDUPTHER

## 2018-01-23 RX ORDER — AMLODIPINE BESYLATE 2.5 MG/1
TABLET ORAL
Qty: 30 TAB | Refills: 5 | Status: SHIPPED | OUTPATIENT
Start: 2018-01-23 | End: 2019-01-09 | Stop reason: SDUPTHER

## 2018-01-23 NOTE — PATIENT INSTRUCTIONS
Learning About High Cholesterol  What is high cholesterol? Cholesterol is a type of fat in your blood. It is needed for many body functions, such as making new cells. Cholesterol is made by your body. It also comes from food you eat. If you have too much cholesterol, it starts to build up in your arteries. This is called hardening of the arteries, or atherosclerosis. High cholesterol raises your risk of a heart attack and stroke. There are different types of cholesterol. LDL is the \"bad\" cholesterol. High LDL can raise your risk for heart disease, heart attack, and stroke. HDL is the \"good\" cholesterol. High HDL is linked with a lower risk for heart disease, heart attack, and stroke. Your cholesterol levels help your doctor find out your risk for having a heart attack or stroke. How can you prevent high cholesterol? A heart-healthy lifestyle can help you prevent high cholesterol. This lifestyle helps lower your risk for a heart attack and stroke. · Eat heart-healthy foods. ¨ Eat fruits, vegetables, whole grains (like oatmeal), dried beans and peas, nuts and seeds, soy products (like tofu), and fat-free or low-fat dairy products. ¨ Replace butter, margarine, and hydrogenated or partially hydrogenated oils with olive and canola oils. (Canola oil margarine without trans fat is fine.)  ¨ Replace red meat with fish, poultry, and soy protein (like tofu). ¨ Limit processed and packaged foods like chips, crackers, and cookies. · Be active. Exercise can improve your cholesterol level. Get at least 30 minutes of exercise on most days of the week. Walking is a good choice. You also may want to do other activities, such as running, swimming, cycling, or playing tennis or team sports. · Stay at a healthy weight. Lose weight if you need to. · Don't smoke. If you need help quitting, talk to your doctor about stop-smoking programs and medicines. These can increase your chances of quitting for good.   How is high cholesterol treated? The goal of treatment is to reduce your chances of having a heart attack or stroke. The goal is not to lower your cholesterol numbers only. · You may make lifestyle changes, such as eating healthy foods, not smoking, losing weight, and being more active. · You may have to take medicine. Follow-up care is a key part of your treatment and safety. Be sure to make and go to all appointments, and call your doctor if you are having problems. It's also a good idea to know your test results and keep a list of the medicines you take. Where can you learn more? Go to http://rosa-becky.info/. Enter F732 in the search box to learn more about \"Learning About High Cholesterol. \"  Current as of: September 21, 2016  Content Version: 11.4  © 8690-5465 Healthwise, Incorporated. Care instructions adapted under license by [employer] (which disclaims liability or warranty for this information). If you have questions about a medical condition or this instruction, always ask your healthcare professional. [address] any warranty or liability for your use of this information.

## 2018-01-23 NOTE — MR AVS SNAPSHOT
[address] [place] [address] 
[phone] Patient: Renu Nguyen MRN: [id number] ELS:[birth year] Visit Information Date & Time Provider Department Dept. Phone Encounter #  
 1/23/2018  8:00 AM Jannet Epstein MD [address] [id number] Follow-up Instructions Return in about 3 months (around 4/23/2018). Upcoming Health Maintenance Date Due  
 GLAUCOMA SCREENING Q2Y 3/18/2017 MEDICARE YEARLY EXAM 9/23/2018 Pneumococcal 65+ High/Highest Risk (2 of 2 - PPSV23) 9/1/2020 DTaP/Tdap/Td series (2 - Td) 11/11/2024 COLONOSCOPY 10/4/2026 Allergies as of 1/23/2018  Review Complete On: 1/23/2018 By: Keke Ortega Severity Noted Reaction Type Reactions Lipitor [Atorvastatin]  04/27/2010    Other (comments) Myalgia Simvastatin  04/27/2010    Other (comments)  
 myalgia Current Immunizations  Reviewed on 10/11/2017 Name Date Influenza High Dose Vaccine PF 10/11/2017 Influenza Vaccine 10/23/2013 Influenza Vaccine (Quad) PF 9/21/2016, 10/16/2015 Influenza Vaccine Split 10/26/2012, 10/21/2011, 11/10/2010, 12/3/2009 Pneumococcal Conjugate (PCV-13) 11/30/2016 Pneumococcal Polysaccharide (PPSV-23) 9/1/2015 Tdap 11/11/2014 Not reviewed this visit You Were Diagnosed With   
  
 Codes Comments [facility]bellar [facility] ([facility]    -  Primary ICD-10-CM: I63.9 ICD-9-CM: 434.91 Essential hypertension     ICD-10-CM: I10 
ICD-9-CM: 401.9 Hypothyroidism due to acquired atrophy of thyroid     ICD-10-CM: E03.4 ICD-9-CM: 244.8, 246.8 Mixed hyperlipidemia     ICD-10-CM: E78.2 ICD-9-CM: 272.2 Family history of diabetes mellitus     ICD-10-CM: Z83.3 ICD-9-CM: V18.0 Vitals BP Pulse Temp Resp Height(growth percentile) Weight(growth percentile)  137/82 (BP 1 Location: Left arm, BP Patient Position: Sitting) 64 98.2 °F (36.8 °C) (Oral) 18 5' 11\" (1.803 m) 209 lb (94.8 kg) SpO2 BMI Smoking Status 98% 29.15 kg/m2 Former Smoker Vitals History BMI and BSA Data Body Mass Index Body Surface Area  
 29.15 kg/m 2 2.18 m 2 Preferred Pharmacy Pharmacy Name Phone [address] [place], [place] - [address] [facility]  [phone] Your Updated Medication List  
  
   
This list is accurate as of: 1/23/18  8:27 AM.  Always use your most recent med list. amLODIPine 2.5 mg tablet Commonly known as:  [place] TAKE ONE TABLET BY MOUTH EVERY DAY  
  
 aspirin-dipyridamole  mg per SR capsule Commonly known as:  AGGRENOX Take 1 Cap by mouth two (2) times a day. avanafil 100 mg tablet Commonly known as:  OVKJFQT Take  by mouth as needed for Other. FISH OIL 1,000 mg Cap Generic drug:  omega-3 fatty acids-vitamin e Take 1 Cap by mouth two (2) times a day. fluticasone 50 mcg/actuation nasal spray Commonly known as:  FLONASE  
USE 2 SPRAYS IN EACH NOSTRIL DAILY  
  
 * MAG-OXIDE 400 mg tablet Generic drug:  magnesium oxide Take 400 mg by mouth daily. * magnesium oxide 400 mg tablet Commonly known as:  MAG-OX Take 400 mg by mouth daily. * magnesium oxide 400 mg tablet Commonly known as:  MAG-OXIDE Take 1 Tab by mouth daily. pravastatin 40 mg tablet Commonly known as:  PRAVACHOL  
TAKE ONE TABLET BY MOUTH AT NIGHT ON MONDAY, [facility] & FRIDAY Indications: hyperlipidemia Senna 8.6 mg tablet Generic drug:  senna Take 1 Tab by mouth daily. SYNTHROID 150 mcg tablet Generic drug:  levothyroxine TAKE 1 TABLET BY MOUTH DAILY BEFORE BREAKFAST  
  
 VITAMIN B-12 1,000 mcg tablet Generic drug:  cyanocobalamin Take 1,000 mcg by mouth daily. ZETIA 10 mg tablet Generic drug:  ezetimibe TAKE ONE TABLET BY MOUTH EVERY DAY  
  
 * Notice: This list has 3 medication(s) that are the same as other medications prescribed for you. Read the directions carefully, and ask your doctor or other care provider to review them with you. We Performed the Following HEMOGLOBIN A1C WITH EAG [22640 CPT(R)] LIPID PANEL [75727 CPT(R)] METABOLIC PANEL, COMPREHENSIVE [75538 CPT(R)] T4, FREE [id number] CPT(R)] TSH 3RD GENERATION [73356 CPT(R)] Follow-up Instructions Return in about 3 months (around 4/23/2018). Patient Instructions Learning About High Cholesterol What is high cholesterol? Cholesterol is a type of fat in your blood. It is needed for many body functions, such as making new cells. Cholesterol is made by your body. It also comes from food you eat. If you have too much cholesterol, it starts to build up in your arteries. This is called hardening of the arteries, or atherosclerosis. High cholesterol raises your risk of a heart attack and stroke. There are different types of cholesterol. LDL is the \"bad\" cholesterol. High LDL can raise your risk for heart disease, heart attack, and stroke. HDL is the \"good\" cholesterol. High HDL is linked with a lower risk for heart disease, heart attack, and stroke. Your cholesterol levels help your doctor find out your risk for having a heart attack or stroke. How can you prevent high cholesterol? A heart-healthy lifestyle can help you prevent high cholesterol. This lifestyle helps lower your risk for a heart attack and stroke. · Eat heart-healthy foods. ¨ Eat fruits, vegetables, whole grains (like oatmeal), dried beans and peas, nuts and seeds, soy products (like tofu), and fat-free or low-fat dairy products. ¨ Replace butter, margarine, and hydrogenated or partially hydrogenated oils with olive and canola oils. (Canola oil margarine without trans fat is fine.) ¨ Replace red meat with fish, poultry, and soy protein (like tofu). ¨ Limit processed and packaged foods like chips, crackers, and cookies. · Be active. Exercise can improve your cholesterol level. Get at least 30 minutes of exercise on most days of the week. Walking is a good choice. You also may want to do other activities, such as running, swimming, cycling, or playing tennis or team sports. · Stay at a healthy weight. Lose weight if you need to. · Don't smoke. If you need help quitting, talk to your doctor about stop-smoking programs and medicines. These can increase your chances of quitting for good. How is high cholesterol treated? The goal of treatment is to reduce your chances of having a heart attack or stroke. The goal is not to lower your cholesterol numbers only. · You may make lifestyle changes, such as eating healthy foods, not smoking, losing weight, and being more active. · You may have to take medicine. Follow-up care is a key part of your treatment and safety. Be sure to make and go to all appointments, and call your doctor if you are having problems. It's also a good idea to know your test results and keep a list of the medicines you take. Where can you learn more? Go to http://rosa-becky.info/. Enter Q533 in the search box to learn more about \"Learning About High Cholesterol. \" Current as of: September 21, 2016 Content Version: 11.4 © 9452-8635 Healthwise, Incorporated. Care instructions adapted under license by [employer] (which disclaims liability or warranty for this information). If you have questions about a medical condition or this instruction, always ask your healthcare professional. [address] any warranty or liability for your use of this information. Introducing [facility]! Colin Bazzi introduces [employer] patient portal. Now you can access parts of your medical record, email your doctor's office, and request medication refills online.    
 
1. In your internet browser, go to [email or web address] 2. Click on the First Time User? Click Here link in the Sign In box. You will see the New Member Sign Up page. 3. Enter your [employer] Access Code exactly as it appears below. You will not need to use this code after youve completed the sign-up process. If you do not sign up before the expiration date, you must request a new code. · [employer] Access Code: [email or web address] Expires: 4/23/2018  8:03 AM 
 
4. Enter the last four digits of your Social Security Number (xxxx) and Date of Birth (mm/dd/yyyy) as indicated and click Submit. You will be taken to the next sign-up page. 5. Create a Loc[employer] ID. This will be your [employer] login ID and cannot be changed, so think of one that is secure and easy to remember. 6. Create a [employer] password. You can change your password at any time. 7. Enter your Password Reset Question and Answer. This can be used at a later time if you forget your password. 8. Enter your e-mail address. You will receive e-mail notification when new information is available in [address] 9. Click Sign Up. You can now view and download portions of your medical record. 10. Click the Download Summary menu link to download a portable copy of your medical information. If you have questions, please visit the Frequently Asked Questions section of the [employer] website. Remember, [employer] is NOT to be used for urgent needs. For medical emergencies, dial 911. Now available from your iPhone and Android! Please provide this summary of care documentation to your next provider. Your primary care clinician is listed as [address]. If you have any questions after today's visit, please call [phone].

## 2018-01-23 NOTE — PROGRESS NOTES
Reviewed record in preparation for visit and have necessary documentation  Pt did not bring medication to office visit for review    Goals that were addressed and/or need to be completed during or after this appointment include   Health Maintenance Due   Topic Date Due    GLAUCOMA SCREENING Q2Y  03/18/2017

## 2018-01-23 NOTE — PROGRESS NOTES
Progress Note    Patient: Fernando Pritchett MRN: [id number]  SSN: [id number]    YOB: 1950  Age: 74 y.o. Sex: male        Chief Complaint   Patient presents with    Hypothyroidism    Hypertension         Subjective:     Encounter Diagnoses   Name Primary?  Cerebellar stroke ([address]- left cerebellar cva since I have seen him. Minimal residual.   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 He has neurology appt tommorow. [facility] to ASA 81 mg now after 3 months of ASA and plavix. Yes    Essential hypertension: controlled  BP Readings from Last 3 Encounters:   01/23/18 137/82   09/22/17 123/85   05/26/17 145/85     The patient reports:  taking medications as instructed, no medication side effects noted, no TIA's, no chest pain on exertion, no dyspnea on exertion, no swelling of ankles. Lab Results   Component Value Date/Time    Sodium 142 09/29/2017 11:52 AM    Potassium 4.3 09/29/2017 11:52 AM    Chloride 105 09/29/2017 11:52 AM    CO2 21 09/29/2017 11:52 AM    Anion gap 6 05/24/2011 02:28 PM    Glucose 96 09/29/2017 11:52 AM    BUN 15 09/29/2017 11:52 AM    Creatinine 0.70 09/29/2017 11:52 AM    BUN/Creatinine ratio 21 09/29/2017 11:52 AM    GFR est  09/29/2017 11:52 AM    GFR est non-AA 99 09/29/2017 11:52 AM    Calcium 9.8 09/29/2017 11:52 AM    Bilirubin, total 0.5 09/29/2017 11:52 AM    AST (SGOT) 19 09/29/2017 11:52 AM    Alk. phosphatase 71 09/29/2017 11:52 AM    Protein, total 7.2 09/29/2017 11:52 AM    Albumin 4.3 09/29/2017 11:52 AM    Globulin 3.9 05/24/2011 02:28 PM    A-G Ratio 1.5 09/29/2017 11:52 AM    ALT (SGPT) 12 09/29/2017 11:52 AM     Our goal is to normalize the blood pressure to decrease the risks of strokes and heart attacks. The patient is in agreement with the plan.          Hypothyroidism due to acquired atrophy of thyroid:  Lab Results   Component Value Date/Time    TSH 1.050 09/29/2017 11:52 AM    T4, Free 1.27 09/29/2017 11:52 AM    T4, Total 9.6 11/10/2010 01:21 AM      Denies fatigue, nervousness,weight changes, heat orcold intolerance, bowel changes,skin changes, cardiovascular symptoms, hair loss, feeling excessive energy, tremor, palpitations and weight loss. Thyroid medication has been unchanged since last medication check and labs.  Mixed hyperlipidemia:would prefer LDL lower. statin intolerance  Cardiovascular risks for him are: LDL goal is under 80  prior CVA/TIA or known carotid artery disease. Currently he takes Pravachol (pravastatin) , 40 mg 3x week. Lab Results   Component Value Date/Time    Cholesterol, total 163 09/29/2017 11:52 AM    HDL Cholesterol 52 09/29/2017 11:52 AM    LDL, calculated 97 09/29/2017 11:52 AM    Triglyceride 70 09/29/2017 11:52 AM    CHOL/HDL Ratio 3.9 11/10/2010 01:21 AM     Lab Results   Component Value Date/Time    ALT (SGPT) 12 09/29/2017 11:52 AM    AST (SGOT) 19 09/29/2017 11:52 AM    Alk. phosphatase 71 09/29/2017 11:52 AM    Bilirubin, total 0.5 09/29/2017 11:52 AM      Myalgias: No   Fatigue: No   Other side effects: no  Wt Readings from Last 3 Encounters:   01/23/18 209 lb (94.8 kg)   10/11/17 211 lb (95.7 kg)   09/22/17 211 lb 12.8 oz (96.1 kg)     The patient is aware of our goal to reduce or eliminate the long term problems (such as strokes and heart attacks) related to poorly controlled hyperlipidemia.  Family history of diabetes mellitus:  No sx of fulminant diabetes. No significant weight loss or gain. Lab Results   Component Value Date/Time    Hemoglobin A1c 5.5 09/21/2016 09:01 AM     Lab Results   Component Value Date/Time    Glucose 96 09/29/2017 11:52 AM     Wt Readings from Last 3 Encounters:   01/23/18 209 lb (94.8 kg)   10/11/17 211 lb (95.7 kg)   09/22/17 211 lb 12.8 oz (96.1 kg)     Body mass index is 29.15 kg/(m^2).          Pure hypercholesterolemia- see above              Current and past medical information:    Current Medications after this visit[de-identified]   Current Outpatient Prescriptions   Medication Sig    amLODIPine (NORVASC) 2.5 mg tablet TAKE ONE TABLET BY MOUTH EVERY DAY    SYNTHROID 150 mcg tablet TAKE 1 TABLET BY MOUTH DAILY BEFORE BREAKFAST    fluticasone (FLONASE) 50 mcg/actuation nasal spray USE 2 SPRAYS IN EACH NOSTRIL DAILY    magnesium oxide (MAG-OX) 400 mg tablet Take 400 mg by mouth daily.  pravastatin (PRAVACHOL) 40 mg tablet TAKE ONE TABLET BY MOUTH AT NIGHT ON MONDAY, [facility] & FRIDAY Indications: hyperlipidemia    ZETIA 10 mg tablet TAKE ONE TABLET BY MOUTH EVERY DAY    senna (SENNA) 8.6 mg tablet Take 1 Tab by mouth daily.  cyanocobalamin (VITAMIN B-12) 1,000 mcg tablet Take 1,000 mcg by mouth daily.  avanafil (STENDRA) 100 mg tablet Take  by mouth as needed for Other.  omega-3 fatty acids-vitamin e (FISH OIL) 1,000 mg Cap Take 1 Cap by mouth two (2) times a day.  magnesium oxide (MAG-OXIDE) 400 mg tablet Take 400 mg by mouth daily.  aspirin-dipyridamole (AGGRENOX)  mg per SR capsule Take 1 Cap by mouth two (2) times a day.  [CANCELED] magnesium oxide (MAG-OXIDE) 400 mg tablet Take 1 Tab by mouth daily. No current facility-administered medications for this visit.         Patient Active Problem List    Diagnosis Date Noted    Mixed hyperlipidemia 04/27/2010     Priority: 1 - One    Benign neoplasm of colon 04/27/2010     Priority: 1 - One    Tobacco use disorder 04/27/2010     Priority: 1 - One    Keloid 04/27/2010     Priority: 1 - One    Internal hemorrhoid 04/27/2010     Priority: 1 - One    Thyroglossal duct cyst 09/20/2010     Priority: 3 - Three    History of CVA (cerebrovascular accident) 05/22/2015    HTN (hypertension) 11/11/2014    Prostate cancer ([facility] 04/17/2011    Hypothyroidism 09/25/2010    Boil 09/20/2010       Past Medical History:   Diagnosis Date    Benign neoplasm of colon 4/27/2010    Cancer ([facility]     PROSTATE    HTN (hypertension) 11/11/2014    Hypercholesterolemia  Internal hemorrhoid 4/27/2010    Keloid 4/27/2010    Mixed hyperlipidemia 4/27/2010    Neck mass 4/27/2010    Other ill-defined conditions(799.89)     HI CHOLESTEROL    Prostate cancer ([facility] 4/17/2011    Smoker     Stroke [facility])     Per pt 2014    Thyroid disease     HYPO    Tobacco use disorder 4/27/2010       Allergies   Allergen Reactions    Lipitor [Atorvastatin] Other (comments)     Myalgia      Simvastatin Other (comments)     myalgia         Past Surgical History:   Procedure Laterality Date    HX GI      COLONOSCOPY    HX OTHER SURGICAL      KELOIDS--BACK    HX PROSTATECTOMY      PROSTATE BIOPSY    HX SPLENECTOMY  1969    adhesions----1997       Social History     Social History    Marital status:      Spouse name: N/A    Number of children: N/A    Years of education: N/A     Social History Main Topics    Smoking status: Former Smoker     Packs/day: 0.25     Years: 40.00     Types: Cigarettes     Quit date: 2/9/2015    Smokeless tobacco: Never Used      Comment: smokes 1/2 pack week    Alcohol use 3.0 oz/week     6 Cans of beer per week      Comment: moderately    Drug use: No    Sexual activity: Yes     Other Topics Concern    None     Social History Narrative       Review of Systems   Constitutional: Negative. Negative for chills, fever, malaise/fatigue and weight loss. HENT: Negative. Negative for hearing loss. Eyes: Negative. Negative for blurred vision and double vision. Respiratory: Negative. Negative for cough, hemoptysis, sputum production and shortness of breath. Cardiovascular: Negative. Negative for chest pain, palpitations and orthopnea. Gastrointestinal: Negative. Negative for abdominal pain, blood in stool, heartburn, nausea and vomiting. Genitourinary: Negative. Negative for dysuria, frequency and urgency. Musculoskeletal: Positive for joint pain. Negative for back pain, myalgias and neck pain. Skin: Negative. Negative for rash. Neurological: Positive for focal weakness. Negative for dizziness, tingling, tremors, weakness and headaches. Left arm and hand weaknees since before c-spine surgery. Has had a TIA and 2 strokes now. Endo/Heme/Allergies: Negative. Psychiatric/Behavioral: Negative. Negative for depression, substance abuse and suicidal ideas. Objective:     Vitals:    01/23/18 0805   BP: 137/82   Pulse: 64   Resp: 18   Temp: 98.2 °F (36.8 °C)   TempSrc: Oral   SpO2: 98%   Weight: 209 lb (94.8 kg)   Height: 5' 11\" (1.803 m)      Body mass index is 29.15 kg/(m^2). Physical Exam   Constitutional: He is oriented to person, place, and time and well-developed, well-nourished, and in no distress. HENT:   Head: Normocephalic and atraumatic. Mouth/Throat: Oropharynx is clear and moist.   Eyes: Right eye exhibits no discharge. Left eye exhibits no discharge. No scleral icterus. Neck: No tracheal deviation present. No thyromegaly present. No bruit. Cardiovascular: Normal rate, regular rhythm and normal heart sounds. Pulmonary/Chest: Effort normal and breath sounds normal.   Abdominal: Soft. Musculoskeletal:   Unable to raise left arm, floppy left biceps, decreased left . Neurological: He is alert and oriented to person, place, and time. Skin: No rash noted. No erythema. Psychiatric: Mood and affect normal.   Nursing note and vitals reviewed. Health Maintenance Due   Topic Date Due    GLAUCOMA SCREENING Q2Y  03/18/2017         Assessment and orders:     Encounter Diagnoses     ICD-10-CM ICD-9-CM   1. Cerebellar stroke (HCC) I63.9 434.91   2. Essential hypertension I10 401.9   3. Hypothyroidism due to acquired atrophy of thyroid E03.4 244.8     246.8   4. Mixed hyperlipidemia E78.2 272.2   5. Family history of diabetes mellitus Z83.3 V18.0   6. Pure hypercholesterolemia E78.00 272.0     Diagnoses and all orders for this visit:    1.  Cerebellar stroke ([facility]- has recovered well  -     LIPID PANEL  -     HEMOGLOBIN A1C WITH EAG  -     METABOLIC PANEL, COMPREHENSIVE    2. Essential hypertension- controlled  -     LIPID PANEL  -     METABOLIC PANEL, COMPREHENSIVE  -     T4, FREE  -     TSH 3RD GENERATION  -     amLODIPine (NORVASC) 2.5 mg tablet; TAKE ONE TABLET BY MOUTH EVERY DAY    3. Hypothyroidism due to acquired atrophy of thyroid-retest  -     T4, FREE  -     TSH 3RD GENERATION    4. Mixed hyperlipidemia- retest with statin intolerance. -     LIPID PANEL  -     METABOLIC PANEL, COMPREHENSIVE  -     pravastatin (PRAVACHOL) 40 mg tablet; TAKE ONE TABLET BY MOUTH AT NIGHT ON MONDAY, [facility] & FRIDAY Indications: hyperlipidemia    5. Family history of diabetes mellitus  -     HEMOGLOBIN A1C WITH EAG    6. Pure hypercholesterolemia  -     pravastatin (PRAVACHOL) 40 mg tablet; TAKE ONE TABLET BY MOUTH AT NIGHT ON MONDAY, [facility] & FRIDAY Indications: hyperlipidemia      Plan of care:  Discussed diagnoses in detail with patient. Medication risks/benefits/side effects discussed with patient. All of the patient's questions were addressed. The patient understands and agrees with our plan of care. The patient knows to call back if they are unsure of or forget any changes we discussed today or if the symptoms change. The patient received an After-Visit Summary which contains VS, orders, medication list and allergy list. This can be used as a \"mini-medical record\" should they have to seek medical care while out of town. Patient Care Team:  Carlene Devlin MD as PCP - General (Family Practice)  Rosita Dyer MD (Orthopedic Surgery)  Clementina Wang MD as Physician (Gastroenterology)  Karon Rodriguez RN as Ambulatory Care Navigator    Follow-up Disposition:  Return in about 3 months (around 4/23/2018).     Future Appointments  Date Time Provider Department Center   4/23/2018 8:20 Chris Schuster MD [facility] SCHED       Signed By: Carlene Devlin MD     January 23, 2018

## 2018-01-24 LAB
ALBUMIN SERPL-MCNC: 4.4 G/DL (ref 3.6–4.8)
ALBUMIN/GLOB SERPL: 1.5 {RATIO} (ref 1.2–2.2)
ALP SERPL-CCNC: 84 IU/L (ref 39–117)
ALT SERPL-CCNC: 18 IU/L (ref 0–44)
AST SERPL-CCNC: 21 IU/L (ref 0–40)
BILIRUB SERPL-MCNC: 0.4 MG/DL (ref 0–1.2)
BUN SERPL-MCNC: 16 MG/DL (ref 8–27)
BUN/CREAT SERPL: 18 (ref 10–24)
CALCIUM SERPL-MCNC: 10.3 MG/DL (ref 8.6–10.2)
CHLORIDE SERPL-SCNC: 104 MMOL/L (ref 96–106)
CHOLEST SERPL-MCNC: 186 MG/DL (ref 100–199)
CO2 SERPL-SCNC: 22 MMOL/L (ref 18–29)
CREAT SERPL-MCNC: 0.87 MG/DL (ref 0.76–1.27)
EST. AVERAGE GLUCOSE BLD GHB EST-MCNC: 94 MG/DL
GLOBULIN SER CALC-MCNC: 3 G/DL (ref 1.5–4.5)
GLUCOSE SERPL-MCNC: 94 MG/DL (ref 65–99)
HBA1C MFR BLD: 4.9 % (ref 4.8–5.6)
HDLC SERPL-MCNC: 49 MG/DL
LDLC SERPL CALC-MCNC: 121 MG/DL (ref 0–99)
POTASSIUM SERPL-SCNC: 5 MMOL/L (ref 3.5–5.2)
PROT SERPL-MCNC: 7.4 G/DL (ref 6–8.5)
SODIUM SERPL-SCNC: 142 MMOL/L (ref 134–144)
T4 FREE SERPL-MCNC: 1.41 NG/DL (ref 0.82–1.77)
TRIGL SERPL-MCNC: 80 MG/DL (ref 0–149)
TSH SERPL DL<=0.005 MIU/L-ACNC: 1.04 UIU/ML (ref 0.45–4.5)
VLDLC SERPL CALC-MCNC: 16 MG/DL (ref 5–40)

## 2018-02-28 ENCOUNTER — OFFICE VISIT (OUTPATIENT)
Dept: FAMILY MEDICINE CLINIC | Age: 68
End: 2018-02-28

## 2018-02-28 ENCOUNTER — TELEPHONE (OUTPATIENT)
Dept: FAMILY MEDICINE CLINIC | Age: 68
End: 2018-02-28

## 2018-02-28 VITALS
HEART RATE: 102 BPM | HEIGHT: 71 IN | RESPIRATION RATE: 18 BRPM | BODY MASS INDEX: 27.61 KG/M2 | WEIGHT: 197.2 LBS | TEMPERATURE: 99.2 F | SYSTOLIC BLOOD PRESSURE: 139 MMHG | OXYGEN SATURATION: 98 % | DIASTOLIC BLOOD PRESSURE: 89 MMHG

## 2018-02-28 DIAGNOSIS — R50.9 ACUTE FEBRILE ILLNESS: ICD-10-CM

## 2018-02-28 DIAGNOSIS — R19.7 NAUSEA, VOMITING AND DIARRHEA: ICD-10-CM

## 2018-02-28 DIAGNOSIS — K52.9 GASTROENTERITIS: Primary | ICD-10-CM

## 2018-02-28 DIAGNOSIS — I10 ESSENTIAL HYPERTENSION: Chronic | ICD-10-CM

## 2018-02-28 DIAGNOSIS — R11.2 NAUSEA, VOMITING AND DIARRHEA: ICD-10-CM

## 2018-02-28 LAB
QUICKVUE INFLUENZA TEST: NEGATIVE
VALID INTERNAL CONTROL?: YES

## 2018-02-28 RX ORDER — OMEPRAZOLE 40 MG/1
40 CAPSULE, DELAYED RELEASE ORAL DAILY
Qty: 14 CAP | Refills: 0 | Status: SHIPPED | OUTPATIENT
Start: 2018-02-28 | End: 2018-04-23 | Stop reason: ALTCHOICE

## 2018-02-28 RX ORDER — ONDANSETRON 8 MG/1
8 TABLET, ORALLY DISINTEGRATING ORAL
Qty: 20 TAB | Refills: 0 | Status: SHIPPED | OUTPATIENT
Start: 2018-02-28 | End: 2018-07-13

## 2018-02-28 RX ORDER — PROMETHAZINE HYDROCHLORIDE 25 MG/1
25 TABLET ORAL
Qty: 20 TAB | Refills: 0 | Status: SHIPPED | OUTPATIENT
Start: 2018-02-28 | End: 2018-07-13

## 2018-02-28 RX ORDER — ONDANSETRON 8 MG/1
8 TABLET, ORALLY DISINTEGRATING ORAL
Qty: 1 TAB | Refills: 0
Start: 2018-02-28 | End: 2018-02-28

## 2018-02-28 NOTE — MR AVS SNAPSHOT
[facility] 
 
 
 [address] [address] 
[phone] Patient: Maddie Siddiqi MRN: [id number] Visit Information Date & Time Provider Department Dept. Phone Encounter #  
 2/28/2018 10:20 AM Giovanny Morel MD [facility] [id number] Follow-up Instructions Return if symptoms worsen or fail to improve. Your Appointments 4/23/2018  8:20 AM  
ROUTINE CARE with Geo Sosa MD  
[address] [place] [address]) Appt Note: 3 mo f/u  
 [address] [address]  
[place] [address] [address] Upcoming Health Maintenance Date Due  
 GLAUCOMA SCREENING Q2Y 3/18/2017 MEDICARE YEARLY EXAM 9/23/2018 Pneumococcal 65+ High/Highest Risk (2 of 2 - PPSV23) 9/1/2020 DTaP/Tdap/Td series (2 - Td) 11/11/2024 COLONOSCOPY 10/4/2026 Allergies as of 2/28/2018  Review Complete On: 2/28/2018 By: Giovanny Morel MD  
  
 Severity Noted Reaction Type Reactions Lipitor [Atorvastatin]  04/27/2010    Other (comments) Myalgia Simvastatin  04/27/2010    Other (comments)  
 myalgia Current Immunizations  Reviewed on 10/11/2017 Name Date Influenza High Dose Vaccine PF 10/11/2017 Influenza Vaccine 10/23/2013 Influenza Vaccine (Quad) PF 9/21/2016, 10/16/2015 Influenza Vaccine Split 10/26/2012, 10/21/2011, 11/10/2010, 12/3/2009 Pneumococcal Conjugate (PCV-13) 11/30/2016 Pneumococcal Polysaccharide (PPSV-23) 9/1/2015 Tdap 11/11/2014 Not reviewed this visit You Were Diagnosed With   
  
 Codes Comments Gastroenteritis    -  Primary ICD-10-CM: K52.9 ICD-9-CM: 558. 9 Acute febrile illness     ICD-10-CM: R50.9 ICD-9-CM: 780.60 Vitals BP Pulse Temp Resp Height(growth percentile) Weight(growth percentile) 139/89 (BP 1 Location: Left arm, BP Patient Position: Sitting) (!) 102 99.2 °F (37.3 °C) (Oral) 18 5' 11\" (1.803 m) 197 lb 3.2 oz (89.4 kg) SpO2 BMI Smoking Status 98% 27.5 kg/m2 Former Smoker Vitals History BMI and BSA Data Body Mass Index Body Surface Area  
 27.5 kg/m 2 2.12 m 2 Preferred Pharmacy Pharmacy Name Phone [address] [place], [place] - [address] [phone] Your Updated Medication List  
  
   
This list is accurate as of 2/28/18 11:19 AM.  Always use your most recent med list. amLODIPine 2.5 mg tablet Commonly known as:  Lauryn Glalakeishaer TAKE ONE TABLET BY MOUTH EVERY DAY  
  
 aspirin-dipyridamole  mg per SR capsule Commonly known as:  AGGRENOX Take 1 Cap by mouth two (2) times a day. avanafil 100 mg tablet Commonly known as:  ADYFSOM Take  by mouth as needed for Other. FISH OIL 1,000 mg Cap Generic drug:  omega-3 fatty acids-vitamin e Take 1 Cap by mouth two (2) times a day. fluticasone 50 mcg/actuation nasal spray Commonly known as:  FLONASE  
USE 2 SPRAYS IN EACH NOSTRIL DAILY  
  
 * MAG-OXIDE 400 mg tablet Generic drug:  magnesium oxide Take 400 mg by mouth daily. * magnesium oxide 400 mg tablet Commonly known as:  MAG-OX Take 400 mg by mouth daily. * magnesium oxide 400 mg tablet Commonly known as:  MAG-OXIDE Take 1 Tab by mouth daily. omeprazole 40 mg capsule Commonly known as:  PRILOSEC Take 1 Cap by mouth daily. ondansetron 8 mg disintegrating tablet Commonly known as:  ZOFRAN ODT Take 1 Tab by mouth now for 1 dose. pravastatin 40 mg tablet Commonly known as:  PRAVACHOL  
TAKE ONE TABLET BY MOUTH AT NIGHT ON MONDAY, [facility] & FRIDAY Indications: hyperlipidemia  
  
 promethazine 25 mg tablet Commonly known as:  PHENERGAN Take 1 Tab by mouth every eight (8) hours as needed for Nausea. Senna 8.6 mg tablet Generic drug:  senna Take 1 Tab by mouth daily. SYNTHROID 150 mcg tablet Generic drug:  levothyroxine TAKE 1 TABLET BY MOUTH DAILY BEFORE BREAKFAST  
  
 VITAMIN B-12 1,000 mcg tablet Generic drug:  cyanocobalamin Take 1,000 mcg by mouth daily. ZETIA 10 mg tablet Generic drug:  ezetimibe TAKE ONE TABLET BY MOUTH EVERY DAY  
  
 * Notice: This list has 3 medication(s) that are the same as other medications prescribed for you. Read the directions carefully, and ask your doctor or other care provider to review them with you. Prescriptions Sent to Pharmacy Refills  
 promethazine (PHENERGAN) 25 mg tablet 0 Sig: Take 1 Tab by mouth every eight (8) hours as needed for Nausea. Class: Normal  
 Pharmacy: [address] [place] [address] #: [phone] Route: Oral  
 omeprazole (PRILOSEC) 40 mg capsule 0 Sig: Take 1 Cap by mouth daily. Class: Normal  
 Pharmacy: [address] [place] [address] #: [phone] Route: Oral  
  
We Performed the Following AMB POC RAPID INFLUENZA TEST [88291 CPT(R)] CBC W/O DIFF [11681 CPT(R)] MAGNESIUM [id number] CPT(R)] METABOLIC PANEL, COMPREHENSIVE [24975 CPT(R)] Follow-up Instructions Return if symptoms worsen or fail to improve. Patient Instructions Gastroenteritis: Care Instructions Your Care Instructions Gastroenteritis is an illness that may cause nausea, vomiting, and diarrhea. It is sometimes called \"stomach flu. \" It can be caused by bacteria or a virus. You will probably begin to feel better in 1 to 2 days. In the meantime, get plenty of rest and make sure you do not become dehydrated. Dehydration occurs when your body loses too much fluid. Follow-up care is a key part of your treatment and safety.  Be sure to make and go to all appointments, and call your doctor if you are having problems. It's also a good idea to know your test results and keep a list of the medicines you take. How can you care for yourself at home? · If your doctor prescribed antibiotics, take them as directed. Do not stop taking them just because you feel better. You need to take the full course of antibiotics. · Drink plenty of fluids to prevent dehydration, enough so that your urine is light yellow or clear like water. Choose water and other caffeine-free clear liquids until you feel better. If you have kidney, heart, or liver disease and have to limit fluids, talk with your doctor before you increase your fluid intake. · Drink fluids slowly, in frequent, small amounts, because drinking too much too fast can cause vomiting. · Begin eating mild foods, such as dry toast, yogurt, applesauce, bananas, and rice. Avoid spicy, hot, or high-fat foods, and do not drink alcohol or caffeine for a day or two. Do not drink milk or eat ice cream until you are feeling better. How to prevent gastroenteritis · Keep hot foods hot and cold foods cold. · Do not eat meats, dressings, salads, or other foods that have been kept at room temperature for more than 2 hours. · Use a thermometer to check your refrigerator. It should be between 34°F and 40°F. 
· Defrost meats in the refrigerator or microwave, not on the kitchen counter. · Keep your hands and your kitchen clean. Wash your hands, cutting boards, and countertops with hot soapy water frequently. · Cook meat until it is well done. · Do not eat raw eggs or uncooked sauces made with raw eggs. · Do not take chances. If food looks or tastes spoiled, throw it out. When should you call for help? Call 911 anytime you think you may need emergency care. For example, call if: 
? · You vomit blood or what looks like coffee grounds. ? · You passed out (lost consciousness). ? · You pass maroon or very bloody stools. ?Call your doctor now or seek immediate medical care if: ? · You have severe belly pain. ? · You have signs of needing more fluids. You have sunken eyes, a dry mouth, and pass only a little dark urine. ? · You feel like you are going to faint. ? · You have increased belly pain that does not go away in 1 to 2 days. ? · You have new or increased nausea, or you are vomiting. ? · You have a new or higher fever. ? · Your stools are black and tarlike or have streaks of blood. ? Watch closely for changes in your health, and be sure to contact your doctor if: 
? · You are dizzy or lightheaded. ? · You urinate less than usual, or your urine is dark yellow or brown. ? · You do not feel better with each day that goes by. Where can you learn more? Go to http://rosa-becky.info/. Enter N142 in the search box to learn more about \"Gastroenteritis: Care Instructions. \" Current as of: March 3, 2017 Content Version: 11.4 © 7630-7355 [employer]. Care instructions adapted under license by [employer] (which disclaims liability or warranty for this information). If you have questions about a medical condition or this instruction, always ask your healthcare professional. [address] any warranty or liability for your use of this information. Introducing [facility]! Hans Pearson introduces HammerKit patient portal. Now you can access parts of your medical record, email your doctor's office, and request medication refills online. 1. In your internet browser, go to [email or web address] 2. Click on the First Time User? Click Here link in the Sign In box. You will see the New Member Sign Up page. 3. Enter your HammerKit Access Code exactly as it appears below. You will not need to use this code after youve completed the sign-up process. If you do not sign up before the expiration date, you must request a new code.  
 
· [employer] Access Code: [email or web address] 
 Expires: 4/23/2018  8:03 AM 
 
4. Enter the last four digits of your Social Security Number (xxxx) and Date of Birth (mm/dd/yyyy) as indicated and click Submit. You will be taken to the next sign-up page. 5. Create a Andel ID. This will be your Andel login ID and cannot be changed, so think of one that is secure and easy to remember. 6. Create a Andel password. You can change your password at any time. 7. Enter your Password Reset Question and Answer. This can be used at a later time if you forget your password. 8. Enter your e-mail address. You will receive e-mail notification when new information is available in [address] 9. Click Sign Up. You can now view and download portions of your medical record. 10. Click the Download Summary menu link to download a portable copy of your medical information. If you have questions, please visit the Frequently Asked Questions section of the Andel website. Remember, Andel is NOT to be used for urgent needs. For medical emergencies, dial 911. Now available from your iPhone and Android! Please provide this summary of care documentation to your next provider. Your primary care clinician is listed as [address]. If you have any questions after today's visit, please call [phone].

## 2018-02-28 NOTE — PATIENT INSTRUCTIONS
Gastroenteritis: Care Instructions  Your Care Instructions    Gastroenteritis is an illness that may cause nausea, vomiting, and diarrhea. It is sometimes called \"stomach flu. \" It can be caused by bacteria or a virus. You will probably begin to feel better in 1 to 2 days. In the meantime, get plenty of rest and make sure you do not become dehydrated. Dehydration occurs when your body loses too much fluid. Follow-up care is a key part of your treatment and safety. Be sure to make and go to all appointments, and call your doctor if you are having problems. It's also a good idea to know your test results and keep a list of the medicines you take. How can you care for yourself at home? · If your doctor prescribed antibiotics, take them as directed. Do not stop taking them just because you feel better. You need to take the full course of antibiotics. · Drink plenty of fluids to prevent dehydration, enough so that your urine is light yellow or clear like water. Choose water and other caffeine-free clear liquids until you feel better. If you have kidney, heart, or liver disease and have to limit fluids, talk with your doctor before you increase your fluid intake. · Drink fluids slowly, in frequent, small amounts, because drinking too much too fast can cause vomiting. · Begin eating mild foods, such as dry toast, yogurt, applesauce, bananas, and rice. Avoid spicy, hot, or high-fat foods, and do not drink alcohol or caffeine for a day or two. Do not drink milk or eat ice cream until you are feeling better. How to prevent gastroenteritis  · Keep hot foods hot and cold foods cold. · Do not eat meats, dressings, salads, or other foods that have been kept at room temperature for more than 2 hours. · Use a thermometer to check your refrigerator. It should be between 34°F and 40°F.  · Defrost meats in the refrigerator or microwave, not on the kitchen counter. · Keep your hands and your kitchen clean.  Wash your hands, cutting boards, and countertops with hot soapy water frequently. · Cook meat until it is well done. · Do not eat raw eggs or uncooked sauces made with raw eggs. · Do not take chances. If food looks or tastes spoiled, throw it out. When should you call for help? Call 911 anytime you think you may need emergency care. For example, call if:  ? · You vomit blood or what looks like coffee grounds. ? · You passed out (lost consciousness). ? · You pass maroon or very bloody stools. ?Call your doctor now or seek immediate medical care if:  ? · You have severe belly pain. ? · You have signs of needing more fluids. You have sunken eyes, a dry mouth, and pass only a little dark urine. ? · You feel like you are going to faint. ? · You have increased belly pain that does not go away in 1 to 2 days. ? · You have new or increased nausea, or you are vomiting. ? · You have a new or higher fever. ? · Your stools are black and tarlike or have streaks of blood. ? Watch closely for changes in your health, and be sure to contact your doctor if:  ? · You are dizzy or lightheaded. ? · You urinate less than usual, or your urine is dark yellow or brown. ? · You do not feel better with each day that goes by. Where can you learn more? Go to http://rosa-bceky.info/. Enter N142 in the search box to learn more about \"Gastroenteritis: Care Instructions. \"  Current as of: March 3, 2017  Content Version: 11.4  © 4039-1470 [employer]. Care instructions adapted under license by [employer] (which disclaims liability or warranty for this information). If you have questions about a medical condition or this instruction, always ask your healthcare professional. [address] any warranty or liability for your use of this information.

## 2018-02-28 NOTE — PROGRESS NOTES
Patient: Renu Nguyen MRN: [id number]  SSN: [id number]    YOB: 1950  Age: 74 y.o. Sex: male      Chief Complaint   Patient presents with    Vomiting    Diarrhea    Stool Color Change     Renu Nguyen is a 74 y.o. male with complaint of gastrointestinal symptoms of watery diarrhea, nausea, vomiting for 3  day(s). There is no bloody diarrhea, abdominal pain, blood in emesis. Symptoms are moderate. The patient is drinking plenty of fluids. There has been weight loss since last OV. There have not been contacts with similar infections. Patient reported improvement of nausea post 8 mg Zifran given in office. BP Readings from Last 3 Encounters:   02/28/18 139/89   01/23/18 137/82   09/22/17 123/85     Wt Readings from Last 3 Encounters:   02/28/18 197 lb 3.2 oz (89.4 kg)   01/23/18 209 lb (94.8 kg)   10/11/17 211 lb (95.7 kg)     Body mass index is 27.5 kg/(m^2). Medications:     Current Outpatient Prescriptions   Medication Sig    ondansetron (ZOFRAN ODT) 8 mg disintegrating tablet Take 1 Tab by mouth now for 1 dose.  promethazine (PHENERGAN) 25 mg tablet Take 1 Tab by mouth every eight (8) hours as needed for Nausea.  omeprazole (PRILOSEC) 40 mg capsule Take 1 Cap by mouth daily.  ondansetron (ZOFRAN ODT) 8 mg disintegrating tablet Take 1 Tab by mouth every eight (8) hours as needed for Nausea.  amLODIPine (NORVASC) 2.5 mg tablet TAKE ONE TABLET BY MOUTH EVERY DAY    pravastatin (PRAVACHOL) 40 mg tablet TAKE ONE TABLET BY MOUTH AT NIGHT ON MONDAY, WEDNESDAY & FRIDAY Indications: hyperlipidemia    SYNTHROID 150 mcg tablet TAKE 1 TABLET BY MOUTH DAILY BEFORE BREAKFAST    fluticasone (FLONASE) 50 mcg/actuation nasal spray USE 2 SPRAYS IN EACH NOSTRIL DAILY    magnesium oxide (MAG-OX) 400 mg tablet Take 400 mg by mouth daily.  ZETIA 10 mg tablet TAKE ONE TABLET BY MOUTH EVERY DAY    senna (SENNA) 8.6 mg tablet Take 1 Tab by mouth daily.     aspirin-dipyridamole (AGGRENOX)  mg per SR capsule Take 1 Cap by mouth two (2) times a day.  cyanocobalamin (VITAMIN B-12) 1,000 mcg tablet Take 1,000 mcg by mouth daily.  avanafil (STENDRA) 100 mg tablet Take  by mouth as needed for Other.  omega-3 fatty acids-vitamin e (FISH OIL) 1,000 mg Cap Take 1 Cap by mouth two (2) times a day.  magnesium oxide (MAG-OXIDE) 400 mg tablet Take 400 mg by mouth daily.  [CANCELED] magnesium oxide (MAG-OXIDE) 400 mg tablet Take 1 Tab by mouth daily. No current facility-administered medications for this visit. Problem List:     Patient Active Problem List    Diagnosis Date Noted    History of CVA (cerebrovascular accident) 05/22/2015    HTN (hypertension) 11/11/2014    Prostate cancer ([facility] 04/17/2011    Hypothyroidism 09/25/2010    Thyroglossal duct cyst 09/20/2010    Boil 09/20/2010    Mixed hyperlipidemia 04/27/2010    Benign neoplasm of colon 04/27/2010    Tobacco use disorder 04/27/2010    Keloid 04/27/2010    Internal hemorrhoid 04/27/2010       Medical History:     Past Medical History:   Diagnosis Date    Benign neoplasm of colon 4/27/2010    Cancer ([facility] [address]     PROSTATE    HTN (hypertension) 11/11/2014    Hypercholesterolemia     Internal hemorrhoid 4/27/2010    Keloid 4/27/2010    Mixed hyperlipidemia 4/27/2010    Neck mass 4/27/2010    Other ill-defined conditions(799.89)     HI CHOLESTEROL    Prostate cancer ([facility] [address] 4/17/2011    Smoker     Stroke [facility])     Per pt 2014    Thyroid disease     HYPO    Tobacco use disorder 4/27/2010       Allergies:      Allergies   Allergen Reactions    Lipitor [Atorvastatin] Other (comments)     Myalgia      Simvastatin Other (comments)     myalgia         Surgical History:     Past Surgical History:   Procedure Laterality Date    HX GI      COLONOSCOPY    HX OTHER SURGICAL      KELOIDS--BACK    HX PROSTATECTOMY      PROSTATE BIOPSY    HX SPLENECTOMY  1969    adhesions----1997       Social History:     Social History     Social History    Marital status:      Spouse name: N/A    Number of children: N/A    Years of education: N/A     Social History Main Topics    Smoking status: Former Smoker     Packs/day: 0.25     Years: 40.00     Types: Cigarettes     Quit date: 2/9/2015    Smokeless tobacco: Never Used      Comment: smokes 1/2 pack week    Alcohol use 3.0 oz/week     6 Cans of beer per week      Comment: moderately    Drug use: No    Sexual activity: Yes     Other Topics Concern    None     Social History Narrative       Review of Symptoms:  Constitutional: No fever, chills, fatigue, malaise  HEENT: Negative for sore throat, congestion, otalgia  Cardiovascular: Negative for chest pain or palpitations  Respiratory: Negative for cough, wheezing or SOB  Gastrointestinal: see HPI  Urinary: Negative for dysuria or voiding dysfunction  Musculoskeletal: Negative for cramping, myalgias or arthralgias   Neurological: Negative for headache, weakness or paresthesia     Vitals:    02/28/18 1020   BP: 139/89   Pulse: (!) 102   Resp: 18   Temp: 99.2 °F (37.3 °C)   TempSrc: Oral   SpO2: 98%   Weight: 197 lb 3.2 oz (89.4 kg)   Height: 5' 11\" (1.803 m)      Physical Examination:   General: well developed, well nourished, appears fatigued  Hydration status: mildly dehydrated. .   Head: Normocephalic, atraumatic  Eyes: Sclera clear, EOMI  Oropharynx: Mucous membranes moist  Neck: full range of motion, no lymphadenopathy  Cardiovascular: normal S1, S2, Regular Rate and Rhythm  Respiratory: Clear to auscultation bilaterally with symmetrical effort  Abdomen: abdomen is soft without significant tenderness, masses, organomegaly or guarding. [place] Extrmities: Full Range of motion  Neurological: Active, alert and oriented, No focal deficits    Diagnoses and all orders for this visit:    1. Gastroenteritis  -     ondansetron (ZOFRAN ODT) 8 mg disintegrating tablet; Take 1 Tab by mouth now for 1 dose.   -     promethazine (PHENERGAN) 25 mg tablet; Take 1 Tab by mouth every eight (8) hours as needed for Nausea. -     omeprazole (PRILOSEC) 40 mg capsule; Take 1 Cap by mouth daily.  -     METABOLIC PANEL, COMPREHENSIVE  -     CBC W/O DIFF  -     MAGNESIUM    2. Acute febrile illness  -     AMB POC RAPID INFLUENZA TEST    Other orders  -     ondansetron (ZOFRAN ODT) 8 mg disintegrating tablet; Take 1 Tab by mouth every eight (8) hours as needed for Nausea. I have recommended small amounts clear fluids frequently, water, 'BRAT' diet and advance diet as tolerated. Return for office visit if symptoms persist or worsen; I have alerted the patient to call if high fever, dehydration , marked weakness, fainting, increased abdominal pain, blood in stool or vomit. Continue currently prescribed medications as written. I have discussed the diagnosis with the patient and the intended plan as seen in the above orders. The patient expresses understanding and agreement with our plan of care. All of the patient's questions were answered to apparent satisfaction. The patient has received an after-visit summary. The patient knows to call our office if there are any questions or concerns regarding diagnosis and treatment plans. I have discussed medication side effects and warnings with the patient as well. Follow-up Disposition:  Return if symptoms worsen or fail to improve.

## 2018-02-28 NOTE — TELEPHONE ENCOUNTER
[facility] Pool                     Pt is calling to speak with the nurse regarding questions about his medications. He is currently at the pharmacy. The best contact is [phone].

## 2018-03-01 LAB
ALBUMIN SERPL-MCNC: 4.7 G/DL (ref 3.6–4.8)
ALBUMIN/GLOB SERPL: 1.2 {RATIO} (ref 1.2–2.2)
ALP SERPL-CCNC: 71 IU/L (ref 39–117)
ALT SERPL-CCNC: 19 IU/L (ref 0–44)
AST SERPL-CCNC: 22 IU/L (ref 0–40)
BILIRUB SERPL-MCNC: 0.7 MG/DL (ref 0–1.2)
BUN SERPL-MCNC: 25 MG/DL (ref 8–27)
BUN/CREAT SERPL: 23 (ref 10–24)
CALCIUM SERPL-MCNC: 11.1 MG/DL (ref 8.6–10.2)
CHLORIDE SERPL-SCNC: 97 MMOL/L (ref 96–106)
CO2 SERPL-SCNC: 21 MMOL/L (ref 18–29)
CREAT SERPL-MCNC: 1.09 MG/DL (ref 0.76–1.27)
ERYTHROCYTE [DISTWIDTH] IN BLOOD BY AUTOMATED COUNT: 12.8 % (ref 12.3–15.4)
GFR SERPLBLD CREATININE-BSD FMLA CKD-EPI: 71 ML/MIN/1.73
GFR SERPLBLD CREATININE-BSD FMLA CKD-EPI: 82 ML/MIN/1.73
GLOBULIN SER CALC-MCNC: 3.9 G/DL (ref 1.5–4.5)
GLUCOSE SERPL-MCNC: 101 MG/DL (ref 65–99)
HCT VFR BLD AUTO: 42 % (ref 37.5–51)
HGB BLD-MCNC: 14.4 G/DL (ref 13–17.7)
MAGNESIUM SERPL-MCNC: 2.3 MG/DL (ref 1.6–2.3)
MCH RBC QN AUTO: 30 PG (ref 26.6–33)
MCHC RBC AUTO-ENTMCNC: 34.3 G/DL (ref 31.5–35.7)
MCV RBC AUTO: 88 FL (ref 79–97)
PLATELET # BLD AUTO: 350 X10E3/UL (ref 150–379)
POTASSIUM SERPL-SCNC: 4 MMOL/L (ref 3.5–5.2)
PROT SERPL-MCNC: 8.6 G/DL (ref 6–8.5)
RBC # BLD AUTO: 4.8 X10E6/UL (ref 4.14–5.8)
SODIUM SERPL-SCNC: 138 MMOL/L (ref 134–144)
WBC # BLD AUTO: 6.9 X10E3/UL (ref 3.4–10.8)

## 2018-04-20 ENCOUNTER — OFFICE VISIT (OUTPATIENT)
Dept: FAMILY MEDICINE CLINIC | Age: 68
End: 2018-04-20

## 2018-04-20 VITALS
HEART RATE: 58 BPM | RESPIRATION RATE: 16 BRPM | SYSTOLIC BLOOD PRESSURE: 134 MMHG | TEMPERATURE: 96.8 F | DIASTOLIC BLOOD PRESSURE: 71 MMHG | HEIGHT: 71 IN | WEIGHT: 202 LBS | OXYGEN SATURATION: 98 % | BODY MASS INDEX: 28.28 KG/M2

## 2018-04-20 DIAGNOSIS — L03.114 CELLULITIS OF LEFT UPPER EXTREMITY: Primary | ICD-10-CM

## 2018-04-20 PROBLEM — R53.1 WEAKNESS: Status: ACTIVE | Noted: 2018-04-20

## 2018-04-20 PROBLEM — M89.09 SHOULDER-HAND SYNDROME: Status: ACTIVE | Noted: 2018-04-20

## 2018-04-20 PROBLEM — E78.00 HYPERCHOLESTEROLEMIA: Status: ACTIVE | Noted: 2018-04-20

## 2018-04-20 PROBLEM — M48.02 SPINAL STENOSIS OF CERVICAL REGION: Status: ACTIVE | Noted: 2018-04-20

## 2018-04-20 PROBLEM — R52 PAIN: Status: ACTIVE | Noted: 2018-04-20

## 2018-04-20 RX ORDER — ASPIRIN 325 MG
325 TABLET ORAL DAILY
COMMUNITY
Start: 2018-04-20

## 2018-04-20 RX ORDER — CEPHALEXIN 500 MG/1
500 CAPSULE ORAL 4 TIMES DAILY
Qty: 40 CAP | Refills: 0 | Status: SHIPPED | OUTPATIENT
Start: 2018-04-20 | End: 2018-04-30

## 2018-04-20 NOTE — PROGRESS NOTES
Progress Note    Patient: Nishant Sepulveda MRN: [id number]  SSN: [id number]    YOB: 1950  Age: 79 y.o. Sex: male        Chief Complaint   Patient presents with    Skin Problem         Subjective:     Patient presents fr red rash on top of his left wrist  Noticed it Tuesday, has been working in his yard periodically recently  He thinks there might be some \"bites\" but he didn't see any ticks or bugs  The rash is red, not warm, not getting any better or worse  He has no pain in the underlying wrist joint  Hasn't noticed any warmth to the joint    Current and past medical information:    Current Medications after this visit[de-identified]     Current Outpatient Prescriptions   Medication Sig    aspirin (ASPIRIN) 325 mg tablet Take 1 Tab by mouth daily.  cephALEXin (KEFLEX) 500 mg capsule Take 1 Cap by mouth four (4) times daily for 10 days.  fluticasone (FLONASE) 50 mcg/actuation nasal spray USE 2 SPRAYS IN EACH NOSTRIL DAILY    promethazine (PHENERGAN) 25 mg tablet Take 1 Tab by mouth every eight (8) hours as needed for Nausea.  omeprazole (PRILOSEC) 40 mg capsule Take 1 Cap by mouth daily.  ondansetron (ZOFRAN ODT) 8 mg disintegrating tablet Take 1 Tab by mouth every eight (8) hours as needed for Nausea.  amLODIPine (NORVASC) 2.5 mg tablet TAKE ONE TABLET BY MOUTH EVERY DAY    pravastatin (PRAVACHOL) 40 mg tablet TAKE ONE TABLET BY MOUTH AT NIGHT ON MONDAY, WEDNESDAY & FRIDAY Indications: hyperlipidemia    SYNTHROID 150 mcg tablet TAKE 1 TABLET BY MOUTH DAILY BEFORE BREAKFAST    magnesium oxide (MAG-OX) 400 mg tablet Take 400 mg by mouth daily.  senna (SENNA) 8.6 mg tablet Take 1 Tab by mouth daily.  cyanocobalamin (VITAMIN B-12) 1,000 mcg tablet Take 1,000 mcg by mouth daily.  avanafil (STENDRA) 100 mg tablet Take  by mouth as needed for Other.  omega-3 fatty acids-vitamin e (FISH OIL) 1,000 mg Cap Take 1 Cap by mouth two (2) times a day.     aspirin-dipyridamole (AGGRENOX)  mg per SR capsule Take 1 Cap by mouth two (2) times a day.  [CANCELED] magnesium oxide (MAG-OXIDE) 400 mg tablet Take 1 Tab by mouth daily. No current facility-administered medications for this visit.         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    Weakness 04/20/2018    History of CVA (cerebrovascular accident) 05/22/2015    C[facility]brovascular accident ([facility]A) ([facility] [address] 02/01/2015    Hypertension 11/11/2014    Prostate cancer ([facility] [address] 04/17/2011    Hypothyroidism 09/25/2010    Thyroglossal duct cyst 09/20/2010    Boil 09/20/2010    Mixed hyperlipidemia 04/27/2010    Benign neoplasm of colon 04/27/2010    Tobacco use disorder 04/27/2010    Keloid 04/27/2010    Internal hemorrhoid 04/27/2010       Past Medical History:   Diagnosis Date    Benign neoplasm of colon 4/27/2010    Cancer ([facility] [address]     PROSTATE    HTN (hypertension) 11/11/2014    Hypercholesterolemia     Internal hemorrhoid 4/27/2010    Keloid 4/27/2010    Mixed hyperlipidemia 4/27/2010    Neck mass 4/27/2010    Other ill-defined conditions(799.89)     HI CHOLESTEROL    Prostate cancer ([facility] [address] 4/17/2011    Smoker     Stroke ([facility] [address]     Per pt 2014    Thyroid disease     HYPO    Tobacco use disorder 4/27/2010       Allergies   Allergen Reactions    Lipitor [Atorvastatin] Other (comments)     Other reaction(s): Cramps  Myalgia      Simvastatin Other (comments)     Other reaction(s): Cramps  myalgia         Past Surgical History:   Procedure Laterality Date    HX GI      COLONOSCOPY    HX OTHER SURGICAL      KELOIDS--BACK    HX PROSTATECTOMY      PROSTATE BIOPSY    HX SPLENECTOMY  1969    adhesions----1997       Social History     Social History    Marital status:      Spouse name: N/A    Number of children: N/A    Years of education: N/A     Social History Main Topics    Smoking status: Former Smoker     Packs/day: 0.25     Years: 40.00     Types: Cigarettes     Quit date: 2/9/2015    Smokeless tobacco: Never Used      Comment: smokes 1/2 pack week    Alcohol use 3.0 oz/week     6 Cans of beer per week      Comment: moderately    Drug use: No    Sexual activity: Yes     Other Topics Concern    None     Social History Narrative       Review of Systems   Respiratory: Negative for cough and hemoptysis. Cardiovascular: Negative for chest pain and palpitations. Gastrointestinal: Negative for nausea and vomiting. Objective:     Vitals:    04/20/18 0826   BP: 134/71   Pulse: (!) 58   Resp: 16   Temp: 96.8 °F (36 °C)   TempSrc: Oral   SpO2: 98%   Weight: 202 lb (91.6 kg)   Height: 5' 11\" (1.803 m)      Body mass index is 28.17 kg/(m^2). Physical Exam   Constitutional: He is oriented to person, place, and time. He appears well-developed and well-nourished. No distress. Eyes: Pupils are equal, round, and reactive to light. No scleral icterus. Cardiovascular: Normal rate and regular rhythm. Exam reveals no gallop. No murmur heard. Pulmonary/Chest: Effort normal. No respiratory distress. He has no wheezes. He has no rales. Abdominal: Soft. He exhibits no distension. There is no tenderness. Musculoskeletal:   Left wrist joint is cool to touch and has FROM, no pain with passive or active ROM. There is diffuse swelling under the erythema   Neurological: He is alert and oriented to person, place, and time. Skin: He is not diaphoretic. There is erythema over the dorsum of the left wrist, 4/6cm, not well defined, with 6 white papules in the middle of the erythema. Psychiatric: He has a normal mood and affect. His behavior is normal.             Assessment and Plan:       ICD-10-CM ICD-9-CM    1. Cellulitis of left upper extremity L03.114 682. 3      Will treat given persistent erythema and high risk area (wrist/hand). Has follow up on Monday.   Advised to go to ER if it extends beyond the marked area. Plan of care:  Discussed diagnoses in detail with patient. Medication risks/benefits/side effects discussed with patient. All of the patient's questions were addressed. The patient understands and agrees with our plan of care. The patient knows to call back if they are unsure of or forget any changes we discussed today or if the symptoms change. The patient received an After-Visit Summary which contains VS, orders, medication list and allergy list. This can be used as a \"mini-medical record\" should they have to seek medical care while out of town.     Patient Care Team:  Geetha Mitchell MD as PCP - General (Family Practice)  Talib Ledesma MD (Orthopedic Surgery)  Valarie Velasquez MD as Physician (Gastroenterology)  Yarely De La Paz RN [facility]    Follow-up Disposition: Not on File    Future Appointments  Date Time Provider Rocky Karimi   4/23/2018 8:20 Zack Li MD [facility]       Signed By: Jimi Vargas MD     April 20, 2018

## 2018-04-20 NOTE — PROGRESS NOTES
1. Have you been to the ER, urgent care clinic since your last visit? Hospitalized since your last visit? No    2. Have you seen or consulted any other health care providers outside of the [address] [place] [address] since your last visit? Include any pap smears or colon screening.  No  Reviewed record in preparation for visit and have necessary documentation  Pt did not bring medication to office visit for review    Goals that were addressed and/or need to be completed during or after this appointment include   Health Maintenance Due   Topic Date Due    GLAUCOMA SCREENING Q2Y  03/18/2017     Pt will schedule an appointment with eye doctor

## 2018-04-20 NOTE — PATIENT INSTRUCTIONS
Cellulitis: Care Instructions  Your Care Instructions    Cellulitis is a skin infection. It often occurs after a break in the skin from a scrape, cut, bite, or puncture, or after a rash. The doctor has checked you carefully, but problems can develop later. If you notice any problems or new symptoms, get medical treatment right away. Follow-up care is a key part of your treatment and safety. Be sure to make and go to all appointments, and call your doctor if you are having problems. It's also a good idea to know your test results and keep a list of the medicines you take. How can you care for yourself at home? · Take your antibiotics as directed. Do not stop taking them just because you feel better. You need to take the full course of antibiotics. · Prop up the infected area on pillows to reduce pain and swelling. Try to keep the area above the level of your heart as often as you can. · If your doctor told you how to care for your wound, follow your doctor's instructions. If you did not get instructions, follow this general advice:  ¨ Wash the wound with clean water 2 times a day. Don't use hydrogen peroxide or alcohol, which can slow healing. ¨ You may cover the wound with a thin layer of petroleum jelly, such as Vaseline, and a nonstick bandage. ¨ Apply more petroleum jelly and replace the bandage as needed. · Be safe with medicines. Take pain medicines exactly as directed. ¨ If the doctor gave you a prescription medicine for pain, take it as prescribed. ¨ If you are not taking a prescription pain medicine, ask your doctor if you can take an over-the-counter medicine. To prevent cellulitis in the future  · Try to prevent cuts, scrapes, or other injuries to your skin. Cellulitis most often occurs where there is a break in the skin. · If you get a scrape, cut, mild burn, or bite, wash the wound with clean water as soon as you can to help avoid infection.  Don't use hydrogen peroxide or alcohol, which can slow healing. · If you have swelling in your legs (edema), support stockings and good skin care may help prevent leg sores and cellulitis. · Take care of your feet, especially if you have diabetes or other conditions that increase the risk of infection. Wear shoes and socks. Do not go barefoot. If you have athlete's foot or other skin problems on your feet, talk to your doctor about how to treat them. When should you call for help? Call your doctor now or seek immediate medical care if:  ? · You have signs that your infection is getting worse, such as:  ¨ Increased pain, swelling, warmth, or redness. ¨ Red streaks leading from the area. ¨ Pus draining from the area. ¨ A fever. ? · You get a rash. ? Watch closely for changes in your health, and be sure to contact your doctor if:  ? · You are not getting better after 1 day (24 hours). ? · You do not get better as expected. Where can you learn more? Go to http://rosa-becky.info/. Harsh Begum in the search box to learn more about \"Cellulitis: Care Instructions. \"  Current as of: October 13, 2016  Content Version: 11.4  © 3453-9568 [employer]. Care instructions adapted under license by [employer] (which disclaims liability or warranty for this information). If you have questions about a medical condition or this instruction, always ask your healthcare professional. [place] [address] any warranty or liability for your use of this information.

## 2018-04-20 NOTE — MR AVS SNAPSHOT
[address] [facility] 
 
 
 [address] [address] 
[phone] Patient: Lulu Mcdermott MRN: [id number] PTJ:[birth year] Visit Information Date & Time Provider Department Dept. Phone Encounter #  
 4/20/2018  8:20 AM Kathy Radford MD 7 Ana Schmitt [id number] Your Appointments 4/23/2018  8:20 AM  
ROUTINE CARE with Reggie Phillips MD  
[facility]: [facility] mo f/u  
 [address] [address]  
[place] [address] [address] Upcoming Health Maintenance Date Due  
 GLAUCOMA SCREENING Q2Y 3/18/2017 MEDICARE YEARLY EXAM 9/23/2018 Pneumococcal 65+ High/Highest Risk (2 of 2 - PPSV23) 9/1/2020 DTaP/Tdap/Td series (2 - Td) 11/11/2024 COLONOSCOPY 10/4/2026 Allergies as of 4/20/2018  Review Complete On: 2/28/2018 By: Keshia Constantino MD  
  
 Severity Noted Reaction Type Reactions Lipitor [Atorvastatin]  04/27/2010    Other (comments) Other reaction(s): Cramps Myalgia Simvastatin  04/27/2010    Other (comments) Other reaction(s): Cramps 
myalgia Current Immunizations  Reviewed on 10/11/2017 Name Date Influenza High Dose Vaccine PF 10/11/2017 Influenza Vaccine 10/23/2013 Influenza Vaccine (Quad) PF 9/21/2016, 10/16/2015 Influenza Vaccine Split 10/26/2012, 10/21/2011, 11/10/2010, 12/3/2009 Pneumococcal Conjugate (PCV-13) 11/30/2016 Pneumococcal Polysaccharide (PPSV-23) 9/1/2015 Tdap 11/11/2014 Not reviewed this visit You Were Diagnosed With   
  
 Codes Comments Cellulitis of left upper extremity    -  Primary ICD-10-CM: L03.114 
ICD-9-CM: 682. 3 Vitals BP Pulse Temp Resp Height(growth percentile) Weight(growth percentile)  134/71 (BP 1 Location: Right arm, BP Patient Position: Sitting) (!) 58 96.8 °F (36 °C) (Oral) 16 5' 11\" (1.803 m) 202 lb (91.6 kg) SpO2 BMI Smoking Status 98% 28.17 kg/m2 Former Smoker Vitals History BMI and BSA Data Body Mass Index Body Surface Area  
 28.17 kg/m 2 2.14 m 2 Preferred Pharmacy Pharmacy Name Phone [facility] [place] [address] [facility] [phone] Your Updated Medication List  
  
   
This list is accurate as of 4/20/18  9:21 AM.  Always use your most recent med list. amLODIPine 2.5 mg tablet Commonly known as:  Leward Rubalcava TAKE ONE TABLET BY MOUTH EVERY DAY  
  
 aspirin 325 mg tablet Commonly known as:  ASPIRIN Take 1 Tab by mouth daily. aspirin-dipyridamole  mg per SR capsule Commonly known as:  AGGRENOX Take 1 Cap by mouth two (2) times a day. avanafil 100 mg tablet Commonly known as:  VUCOJIY Take  by mouth as needed for Other. cephALEXin 500 mg capsule Commonly known as:  Kristen Bees Take 1 Cap by mouth four (4) times daily for 10 days. FISH OIL 1,000 mg Cap Generic drug:  omega-3 fatty acids-vitamin e Take 1 Cap by mouth two (2) times a day. fluticasone 50 mcg/actuation nasal spray Commonly known as:  FLONASE  
USE 2 SPRAYS IN EACH NOSTRIL DAILY * magnesium oxide 400 mg tablet Commonly known as:  MAG-OX Take 400 mg by mouth daily. * magnesium oxide 400 mg tablet Commonly known as:  MAG-OXIDE Take 1 Tab by mouth daily. omeprazole 40 mg capsule Commonly known as:  PRILOSEC Take 1 Cap by mouth daily. ondansetron 8 mg disintegrating tablet Commonly known as:  ZOFRAN ODT Take 1 Tab by mouth every eight (8) hours as needed for Nausea. pravastatin 40 mg tablet Commonly known as:  PRAVACHOL  
TAKE ONE TABLET BY MOUTH AT NIGHT ON MONDAY, [facility] & FRIDAY Indications: hyperlipidemia  
  
 promethazine 25 mg tablet Commonly known as:  PHENERGAN  
 Take 1 Tab by mouth every eight (8) hours as needed for Nausea. Senna 8.6 mg tablet Generic drug:  senna Take 1 Tab by mouth daily. SYNTHROID 150 mcg tablet Generic drug:  levothyroxine TAKE 1 TABLET BY MOUTH DAILY BEFORE BREAKFAST  
  
 VITAMIN B-12 1,000 mcg tablet Generic drug:  cyanocobalamin Take 1,000 mcg by mouth daily. * Notice: This list has 2 medication(s) that are the same as other medications prescribed for you. Read the directions carefully, and ask your doctor or other care provider to review them with you. Prescriptions Sent to Pharmacy Refills  
 cephALEXin (KEFLEX) 500 mg capsule 0 Sig: Take 1 Cap by mouth four (4) times daily for 10 days. Class: Normal  
 Pharmacy: [address] [place] [address] #: [phone] Route: Oral  
  
Patient Instructions Cellulitis: Care Instructions Your Care Instructions Cellulitis is a skin infection. It often occurs after a break in the skin from a scrape, cut, bite, or puncture, or after a rash. The doctor has checked you carefully, but problems can develop later. If you notice any problems or new symptoms, get medical treatment right away. Follow-up care is a key part of your treatment and safety. Be sure to make and go to all appointments, and call your doctor if you are having problems. It's also a good idea to know your test results and keep a list of the medicines you take. How can you care for yourself at home? · Take your antibiotics as directed. Do not stop taking them just because you feel better. You need to take the full course of antibiotics. · Prop up the infected area on pillows to reduce pain and swelling. Try to keep the area above the level of your heart as often as you can. · If your doctor told you how to care for your wound, follow your doctor's instructions. If you did not get instructions, follow this general advice: ¨ Wash the wound with clean water 2 times a day. Don't use hydrogen peroxide or alcohol, which can slow healing. ¨ You may cover the wound with a thin layer of petroleum jelly, such as Vaseline, and a nonstick bandage. ¨ Apply more petroleum jelly and replace the bandage as needed. · Be safe with medicines. Take pain medicines exactly as directed. ¨ If the doctor gave you a prescription medicine for pain, take it as prescribed. ¨ If you are not taking a prescription pain medicine, ask your doctor if you can take an over-the-counter medicine. To prevent cellulitis in the future · Try to prevent cuts, scrapes, or other injuries to your skin. Cellulitis most often occurs where there is a break in the skin. · If you get a scrape, cut, mild burn, or bite, wash the wound with clean water as soon as you can to help avoid infection. Don't use hydrogen peroxide or alcohol, which can slow healing. · If you have swelling in your legs (edema), support stockings and good skin care may help prevent leg sores and cellulitis. · Take care of your feet, especially if you have diabetes or other conditions that increase the risk of infection. Wear shoes and socks. Do not go barefoot. If you have athlete's foot or other skin problems on your feet, talk to your doctor about how to treat them. When should you call for help? Call your doctor now or seek immediate medical care if: 
? · You have signs that your infection is getting worse, such as: 
¨ Increased pain, swelling, warmth, or redness. ¨ Red streaks leading from the area. ¨ Pus draining from the area. ¨ A fever. ? · You get a rash. ? Watch closely for changes in your health, and be sure to contact your doctor if: 
? · You are not getting better after 1 day (24 hours). ? · You do not get better as expected. Where can you learn more? Go to http://rosa-becky.info/.  
Darius Valentine in the search box to learn more about \"Cellulitis: Care Instructions. \" Current as of: October 13, 2016 Content Version: 11.4 © 5494-0149 Healthwise, Acert[employer]. Care instructions adapted under license by [employer] (which disclaims liability or warranty for this information). If you have questions about a medical condition or this instruction, always ask your healthcare professional. [address] any warranty or liability for your use of this information. Introducing [facility] & HEALTH SERVICES! [employer] introduces [employer] patient portal. Now you can access parts of your medical record, email your doctor's office, and request medication refills online. 1. In your internet browser, go to [email or web address] 2. Click on the First Time User? Click Here link in the Sign In box. You will see the New Member Sign Up page. 3. Enter your [employer] Access Code exactly as it appears below. You will not need to use this code after youve completed the sign-up process. If you do not sign up before the expiration date, you must request a new code. · [employer] Access Code: [email or web address] Expires: 4/23/2018  9:03 AM 
 
4. Enter the last four digits of your Social Security Number (xxxx) and Date of Birth (mm/dd/yyyy) as indicated and click Submit. You will be taken to the next sign-up page. 5. Create a [employer] ID. This will be your [employer] login ID and cannot be changed, so think of one that is secure and easy to remember. 6. Create a [employer] password. You can change your password at any time. 7. Enter your Password Reset Question and Answer. This can be used at a later time if you forget your password. 8. Enter your e-mail address. You will receive e-mail notification when new information is available in [address] 9. Click Sign Up. You can now view and download portions of your medical record. 10. Click the Download Summary menu link to download a portable copy of your medical information. If you have questions, please visit the Frequently Asked Questions section of the [employer] website. Remember, [employer] is NOT to be used for urgent needs. For medical emergencies, dial 911. Now available from your iPhone and Android! Please provide this summary of care documentation to your next provider. Your primary care clinician is listed as [address]. If you have any questions after today's visit, please call [phone].

## 2018-04-23 ENCOUNTER — OFFICE VISIT (OUTPATIENT)
Dept: FAMILY MEDICINE CLINIC | Age: 68
End: 2018-04-23

## 2018-04-23 VITALS
BODY MASS INDEX: 28.03 KG/M2 | WEIGHT: 200.2 LBS | RESPIRATION RATE: 16 BRPM | SYSTOLIC BLOOD PRESSURE: 134 MMHG | HEIGHT: 71 IN | DIASTOLIC BLOOD PRESSURE: 78 MMHG | OXYGEN SATURATION: 100 % | TEMPERATURE: 98 F | HEART RATE: 54 BPM

## 2018-04-23 DIAGNOSIS — I63.9 CEREBROVASCULAR ACCIDENT (CVA), UNSPECIFIED MECHANISM (HCC): Chronic | ICD-10-CM

## 2018-04-23 DIAGNOSIS — E78.2 MIXED HYPERLIPIDEMIA: Primary | Chronic | ICD-10-CM

## 2018-04-23 DIAGNOSIS — I10 ESSENTIAL HYPERTENSION: Chronic | ICD-10-CM

## 2018-04-23 DIAGNOSIS — M48.02 SPINAL STENOSIS OF CERVICAL REGION: Chronic | ICD-10-CM

## 2018-04-23 DIAGNOSIS — L03.114 CELLULITIS OF LEFT ARM: ICD-10-CM

## 2018-04-23 DIAGNOSIS — E03.4 HYPOTHYROIDISM DUE TO ACQUIRED ATROPHY OF THYROID: Chronic | ICD-10-CM

## 2018-04-23 DIAGNOSIS — M89.09 SHOULDER-HAND SYNDROME: Chronic | ICD-10-CM

## 2018-04-23 NOTE — MR AVS SNAPSHOT
[address] [place] [address] 
[phone] Patient: Heather Flores MRN: [id number] LLU:[birth year] Visit Information Date & Time Provider Department Dept. Phone Encounter #  
 4/23/2018  8:20 AM Vern Mcgee MD [address] [place] [address] [phone] [id number] Follow-up Instructions Return in about 4 months (around 8/23/2018). Upcoming Health Maintenance Date Due  
 GLAUCOMA SCREENING Q2Y 3/18/2017 MEDICARE YEARLY EXAM 9/23/2018 Pneumococcal 65+ High/Highest Risk (2 of 2 - PPSV23) 9/1/2020 DTaP/Tdap/Td series (2 - Td) 11/11/2024 COLONOSCOPY 10/4/2026 Allergies as of 4/23/2018  Review Complete On: 2/28/2018 By: Emmy Rodriguez MD  
  
 Severity Noted Reaction Type Reactions Lipitor [Atorvastatin]  04/27/2010    Other (comments) Other reaction(s): Cramps Myalgia Simvastatin  04/27/2010    Other (comments) Other reaction(s): Cramps 
myalgia Current Immunizations  Reviewed on 10/11/2017 Name Date Influenza High Dose Vaccine PF 10/11/2017 Influenza Vaccine 10/23/2013 Influenza Vaccine (Quad) PF 9/21/2016, 10/16/2015 Influenza Vaccine Split 10/26/2012, 10/21/2011, 11/10/2010, 12/3/2009 Pneumococcal Conjugate (PCV-13) 11/30/2016 Pneumococcal Polysaccharide (PPSV-23) 9/1/2015 Tdap 11/11/2014 Not reviewed this visit You Were Diagnosed With   
  
 Codes Comments Mixed hyperlipidemia    -  Primary ICD-10-CM: L17.7 ICD-9-CM: 272.2 Cerebrovascular accident (CVA), unspecified mechanism ([facility] [address]     ICD-10-CM: I63.9 ICD-9-CM: 434.91 Essential hypertension     ICD-10-CM: I10 
ICD-9-CM: 401.9 Hypothyroidism due to acquired atrophy of thyroid     ICD-10-CM: E03.4 ICD-9-CM: 244.8, 246.8 Spinal stenosis of cervical region     ICD-10-CM: M48.02 
ICD-9-CM: 723.0 Shoulder-hand syndrome     ICD-10-CM: M89.09 
ICD-9-CM: 337.9 Vitals BP Pulse Temp Resp Height(growth percentile) Weight(growth percentile) 144/79 (BP 1 Location: Right arm, BP Patient Position: Sitting) (!) 55 98 °F (36.7 °C) (Oral) 16 5' 11\" (1.803 m) 200 lb 3.2 oz (90.8 kg) SpO2 BMI Smoking Status 100% 27.92 kg/m2 Former Smoker Vitals History BMI and BSA Data Body Mass Index Body Surface Area  
 27.92 kg/m 2 2.13 m 2 Preferred Pharmacy Pharmacy Name Phone 900 [facility] [place] [address] [facility] [phone] Your Updated Medication List  
  
   
This list is accurate as of 4/23/18  8:41 AM.  Always use your most recent med list. amLODIPine 2.5 mg tablet Commonly known as:  Eids Emerald TAKE ONE TABLET BY MOUTH EVERY DAY  
  
 aspirin 325 mg tablet Commonly known as:  ASPIRIN Take 1 Tab by mouth daily. avanafil 100 mg tablet Commonly known as:  DKDQUVK Take  by mouth as needed for Other. cephALEXin 500 mg capsule Commonly known as:  Lindie Cheyenne Take 1 Cap by mouth four (4) times daily for 10 days. FISH OIL 1,000 mg Cap Generic drug:  omega-3 fatty acids-vitamin e Take 1 Cap by mouth two (2) times a day. fluticasone 50 mcg/actuation nasal spray Commonly known as:  FLONASE  
USE 2 SPRAYS IN EACH NOSTRIL DAILY * magnesium oxide 400 mg tablet Commonly known as:  MAG-OX Take 400 mg by mouth daily. * magnesium oxide 400 mg tablet Commonly known as:  MAG-OXIDE Take 1 Tab by mouth daily. ondansetron 8 mg disintegrating tablet Commonly known as:  ZOFRAN ODT Take 1 Tab by mouth every eight (8) hours as needed for Nausea. pravastatin 40 mg tablet Commonly known as:  PRAVACHOL  
TAKE ONE TABLET BY MOUTH AT NIGHT ON MONDAY, [facility] & FRIDAY Indications: hyperlipidemia  
  
 promethazine 25 mg tablet Commonly known as:  PHENERGAN Take 1 Tab by mouth every eight (8) hours as needed for Nausea. Senna 8.6 mg tablet Generic drug:  senna Take 1 Tab by mouth daily. SYNTHROID 150 mcg tablet Generic drug:  levothyroxine TAKE 1 TABLET BY MOUTH DAILY BEFORE BREAKFAST  
  
 VITAMIN B-12 1,000 mcg tablet Generic drug:  cyanocobalamin Take 1,000 mcg by mouth daily. * Notice: This list has 2 medication(s) that are the same as other medications prescribed for you. Read the directions carefully, and ask your doctor or other care provider to review them with you. We Performed the Following HEMOGLOBIN [id number] CPT(R)] LIPID PANEL [28885 CPT(R)] METABOLIC PANEL, COMPREHENSIVE [32736 CPT(R)] T4, FREE [id number] CPT(R)] TSH 3RD GENERATION [09160 CPT(R)] Follow-up Instructions Return in about 4 months (around 8/23/2018). Patient Instructions DASH Diet: Care Instructions Your Care Instructions The DASH diet is an eating plan that can help lower your blood pressure. DASH stands for Dietary Approaches to Stop Hypertension. Hypertension is high blood pressure. The DASH diet focuses on eating foods that are high in calcium, potassium, and magnesium. These nutrients can lower blood pressure. The foods that are highest in these nutrients are fruits, vegetables, low-fat dairy products, nuts, seeds, and legumes. But taking calcium, potassium, and magnesium supplements instead of eating foods that are high in those nutrients does not have the same effect. The DASH diet also includes whole grains, fish, and poultry. The DASH diet is one of several lifestyle changes your doctor may recommend to lower your high blood pressure. Your doctor may also want you to decrease the amount of sodium in your diet. Lowering sodium while following the DASH diet can lower blood pressure even further than just the DASH diet alone. Follow-up care is a key part of your treatment and safety.  Be sure to make and go to all appointments, and call your doctor if you are having problems. It's also a good idea to know your test results and keep a list of the medicines you take. How can you care for yourself at home? Following the DASH diet · Eat 4 to 5 servings of fruit each day. A serving is 1 medium-sized piece of fruit, ½ cup chopped or canned fruit, 1/4 cup dried fruit, or 4 ounces (½ cup) of fruit juice. Choose fruit more often than fruit juice. · Eat 4 to 5 servings of vegetables each day. A serving is 1 cup of lettuce or raw leafy vegetables, ½ cup of chopped or cooked vegetables, or 4 ounces (½ cup) of vegetable juice. Choose vegetables more often than vegetable juice. · Get 2 to 3 servings of low-fat and fat-free dairy each day. A serving is 8 ounces of milk, 1 cup of yogurt, or 1 ½ ounces of cheese. · Eat 6 to 8 servings of grains each day. A serving is 1 slice of bread, 1 ounce of dry cereal, or ½ cup of cooked rice, pasta, or cooked cereal. Try to choose whole-grain products as much as possible. · Limit lean meat, poultry, and fish to 2 servings each day. A serving is 3 ounces, about the size of a deck of cards. · Eat 4 to 5 servings of nuts, seeds, and legumes (cooked dried beans, lentils, and split peas) each week. A serving is 1/3 cup of nuts, 2 tablespoons of seeds, or ½ cup of cooked beans or peas. · Limit fats and oils to 2 to 3 servings each day. A serving is 1 teaspoon of vegetable oil or 2 tablespoons of salad dressing. · Limit sweets and added sugars to 5 servings or less a week. A serving is 1 tablespoon jelly or jam, ½ cup sorbet, or 1 cup of lemonade. · Eat less than 2,300 milligrams (mg) of sodium a day. If you limit your sodium to 1,500 mg a day, you can lower your blood pressure even more. Tips for success · Start small. Do not try to make dramatic changes to your diet all at once. You might feel that you are missing out on your favorite foods and then be more likely to not follow the plan.  Make small changes, and stick with them. Once those changes become habit, add a few more changes. · Try some of the following: ¨ Make it a goal to eat a fruit or vegetable at every meal and at snacks. This will make it easy to get the recommended amount of fruits and vegetables each day. ¨ Try yogurt topped with fruit and nuts for a snack or healthy dessert. ¨ Add lettuce, tomato, cucumber, and onion to sandwiches. ¨ Combine a ready-made pizza crust with low-fat mozzarella cheese and lots of vegetable toppings. Try using tomatoes, squash, spinach, broccoli, carrots, cauliflower, and onions. ¨ Have a variety of cut-up vegetables with a low-fat dip as an appetizer instead of chips and dip. ¨ Sprinkle sunflower seeds or chopped almonds over salads. Or try adding chopped walnuts or almonds to cooked vegetables. ¨ Try some vegetarian meals using beans and peas. Add garbanzo or kidney beans to salads. Make burritos and tacos with mashed hudson beans or black beans. Where can you learn more? Go to http://rosa-becky.info/. Enter M677 in the search box to learn more about \"DASH Diet: Care Instructions. \" Current as of: September 21, 2016 Content Version: 11.4 © 4064-5849 Healthwise, [employer]. Care instructions adapted under license by [employer] (which disclaims liability or warranty for this information). If you have questions about a medical condition or this instruction, always ask your healthcare professional. [place] [address] any warranty or liability for your use of this information. Introducing \[facility]\"" & HEALTH SERVICES! [employer] introduces [employer] patient portal. Now you can access parts of your medical record, email your doctor's office, and request medication refills online. 1. In your internet browser, go to [email or web address] 2. Click on the First Time User? Click Here link in the Sign In box. You will see the New Member Sign Up page. 3. Enter your [employer] Access Code exactly as it appears below. You will not need to use this code after youve completed the sign-up process. If you do not sign up before the expiration date, you must request a new code. · [employer] Access Code: [email or web address] Expires: 4/23/2018  9:03 AM 
 
4. Enter the last four digits of your Social Security Number (xxxx) and Date of Birth (mm/dd/yyyy) as indicated and click Submit. You will be taken to the next sign-up page. 5. Create a [employer] ID. This will be your [employer] login ID and cannot be changed, so think of one that is secure and easy to remember. 6. Create a [employer] password. You can change your password at any time. 7. Enter your Password Reset Question and Answer. This can be used at a later time if you forget your password. 8. Enter your e-mail address. You will receive e-mail notification when new information is available in [address] 9. Click Sign Up. You can now view and download portions of your medical record. 10. Click the Download Summary menu link to download a portable copy of your medical information. If you have questions, please visit the Frequently Asked Questions section of the [employer] website. Remember, [employer] is NOT to be used for urgent needs. For medical emergencies, dial 911. Now available from your iPhone and Android! Please provide this summary of care documentation to your next provider. Your primary care clinician is listed as [address]. If you have any questions after today's visit, please call [phone].

## 2018-04-23 NOTE — PATIENT INSTRUCTIONS

## 2018-04-23 NOTE — PROGRESS NOTES
Chief Complaint   Patient presents with    Cholesterol Problem    Labs     Fasting    Skin Infection       Body mass index is 27.92 kg/(m^2). 1. Have you been to the ER, urgent care clinic since your last visit? Hospitalized since your last visit? No    2. Have you seen or consulted any other health care providers outside of the [facility] since your last visit? Include any pap smears or colon screening.  No    Reviewed record in preparation for visit and have necessary documentation  Pt did not bring medication to office visit for review  Information was given to pt on Advanced Directives, Living Will  Information was given on Shingles Vaccine  Opportunity was given for questions  Goals that were addressed and/or need to be completed after this appointment include:     Health Maintenance Due   Topic Date Due    GLAUCOMA SCREENING Q2Y  03/18/2017

## 2018-04-23 NOTE — PROGRESS NOTES
Progress Note    Patient: Callie Campbell MRN: [id number]  SSN: [id number]    YOB: 1950  Age: 79 y.o. Sex: male        Chief Complaint   Patient presents with    Cholesterol Problem    Labs     Fasting    Skin Infection         Subjective:     Encounter Diagnoses   Name Primary?  Mixed hyperlipidemia:  Cardiovascular risks for him are: LDL goal is under 100  hypertension  hyperlipidemia  prior CVA/TIA or known carotid artery disease. Currently he takes Pravachol (pravastatin) , 40 mg  Lab Results   Component Value Date/Time    Cholesterol, total 186 01/23/2018 11:39 AM    HDL Cholesterol 49 01/23/2018 11:39 AM    LDL, calculated 121 (H) 01/23/2018 11:39 AM    Triglyceride 80 01/23/2018 11:39 AM    CHOL/HDL Ratio 3.9 11/10/2010 01:21 AM     Lab Results   Component Value Date/Time    ALT (SGPT) 24 03/07/2018 10:14 AM    AST (SGOT) 23 03/07/2018 10:14 AM    Alk. phosphatase 62 03/07/2018 10:14 AM    Bilirubin, total 0.5 03/07/2018 10:14 AM      Myalgias: No   Fatigue: No   Other side effects: no  Wt Readings from Last 3 Encounters:   04/23/18 200 lb 3.2 oz (90.8 kg)   04/20/18 202 lb (91.6 kg)   02/28/18 197 lb 3.2 oz (89.4 kg)     The patient is aware of our goal to reduce or eliminate the long term problems (such as strokes and heart attacks) related to poorly controlled hyperlipidemia. Yes    Cerebrovascular accident (CVA), unspecified mechanism ([address]): no recurrence. Worry about LDL being so high. He has side effects with Lipitor and simvastatin. May need to try low-dose Crestor.  Essential hypertension: Controlled  BP Readings from Last 3 Encounters:   04/23/18 134/78   04/20/18 134/71   02/28/18 139/89   taking medications as instructed, no medication side effects noted, no TIA's, no chest pain on exertion, no dyspnea on exertion, no swelling of ankles  The patient reports:  .      Lab Results   Component Value Date/Time    Sodium 142 03/07/2018 10:14 AM    Potassium 4.3 03/07/2018 10:14 AM    Chloride 104 03/07/2018 10:14 AM    CO2 24 03/07/2018 10:14 AM    Anion gap 6 05/24/2011 02:28 PM    Glucose 89 03/07/2018 10:14 AM    BUN 11 03/07/2018 10:14 AM    Creatinine 0.73 (L) 03/07/2018 10:14 AM    BUN/Creatinine ratio 15 03/07/2018 10:14 AM    GFR est  03/07/2018 10:14 AM    GFR est non-AA 97 03/07/2018 10:14 AM    Calcium 9.5 03/07/2018 10:14 AM    Bilirubin, total 0.5 03/07/2018 10:14 AM    AST (SGOT) 23 03/07/2018 10:14 AM    Alk. phosphatase 62 03/07/2018 10:14 AM    Protein, total 7.3 03/07/2018 10:14 AM    Albumin 3.7 03/07/2018 10:14 AM    Globulin 3.9 05/24/2011 02:28 PM    A-G Ratio 1.0 (L) 03/07/2018 10:14 AM    ALT (SGPT) 24 03/07/2018 10:14 AM     Low salt diet? yes--->dash diet  Aerobic exercise? dicussed  Our goal is to normalize the blood pressure to decrease the risks of strokes and heart attacks. The patient is in agreement with the plan.  Hypothyroidism due to acquired atrophy of thyroid  Lab Results   Component Value Date/Time    TSH 1.040 01/23/2018 11:39 AM    T4, Free 1.41 01/23/2018 11:39 AM    T4, Total 9.6 11/10/2010 01:21 AM      Denies fatigue, nervousness,weight changes, heat orcold intolerance, bowel changes,skin changes, cardiovascular symptoms, hair loss, feeling excessive energy, tremor, palpitations and weight loss. Thyroid medication has been unchanged since last medication check and labs.  Spinal stenosis of cervical region:  Symptoms of pinched nerves have resolved. 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 He is left with only 50% of his strength in his left arm and only 50% of his range of motion. He has difficulty with elevation and abduction.  Cellulitis of left arm: He is at the end of his Keflex course. The area of infection is now cleared and not warm.   He will finish his antibiotic        Current and past medical information:    Current Medications after this visit[de-identified] Current Outpatient Prescriptions   Medication Sig    aspirin (ASPIRIN) 325 mg tablet Take 1 Tab by mouth daily.  cephALEXin (KEFLEX) 500 mg capsule Take 1 Cap by mouth four (4) times daily for 10 days.  fluticasone (FLONASE) 50 mcg/actuation nasal spray USE 2 SPRAYS IN EACH NOSTRIL DAILY    promethazine (PHENERGAN) 25 mg tablet Take 1 Tab by mouth every eight (8) hours as needed for Nausea.  ondansetron (ZOFRAN ODT) 8 mg disintegrating tablet Take 1 Tab by mouth every eight (8) hours as needed for Nausea.  amLODIPine (NORVASC) 2.5 mg tablet TAKE ONE TABLET BY MOUTH EVERY DAY    pravastatin (PRAVACHOL) 40 mg tablet TAKE ONE TABLET BY MOUTH AT NIGHT ON MONDAY, WEDNESDAY & FRIDAY Indications: hyperlipidemia    SYNTHROID 150 mcg tablet TAKE 1 TABLET BY MOUTH DAILY BEFORE BREAKFAST    magnesium oxide (MAG-OX) 400 mg tablet Take 400 mg by mouth daily.  senna (SENNA) 8.6 mg tablet Take 1 Tab by mouth daily.  cyanocobalamin (VITAMIN B-12) 1,000 mcg tablet Take 1,000 mcg by mouth daily.  avanafil (STENDRA) 100 mg tablet Take  by mouth as needed for Other.  omega-3 fatty acids-vitamin e (FISH OIL) 1,000 mg Cap Take 1 Cap by mouth two (2) times a day.  [CANCELED] magnesium oxide (MAG-OXIDE) 400 mg tablet Take 1 Tab by mouth daily. No current facility-administered medications for this visit.         Patient Active Problem List    Diagnosis Date Noted    Mixed hyperlipidemia 04/27/2010     Priority: 1 - One    Benign neoplasm of colon 04/27/2010     Priority: 1 - One    Tobacco use disorder 04/27/2010     Priority: 1 - One    Keloid 04/27/2010     Priority: 1 - One    Internal hemorrhoid 04/27/2010     Priority: 1 - One    Thyroglossal duct cyst 09/20/2010     Priority: 3 - Three    Spinal stenosis of cervical region 04/20/2018    Hypercholesterolemia 04/20/2018    Pain 04/20/2018    Shoulder-hand syndrome 04/20/2018    Weakness 04/20/2018    History of CVA (cerebrovascular accident) 05/22/2015    C[facility]brovascular accident (CVA) ([facility] 02/01/2015    Hypertension 11/11/2014    Prostate cancer ([facility] 04/17/2011    Hypothyroidism 09/25/2010    Boil 09/20/2010       Past Medical History:   Diagnosis Date    Benign neoplasm of colon 4/27/2010    Cancer ([facility]     PROSTATE    HTN (hypertension) 11/11/2014    Hypercholesterolemia     Internal hemorrhoid 4/27/2010    Keloid 4/27/2010    Mixed hyperlipidemia 4/27/2010    Neck mass 4/27/2010    Other ill-defined conditions(799.89)     HI CHOLESTEROL    Prostate cancer ([facility] 4/17/2011    Smoker     Stroke [facility]     Per pt 2014    Thyroid disease     HYPO    Tobacco use disorder 4/27/2010       Allergies   Allergen Reactions    Lipitor [Atorvastatin] Other (comments)     Other reaction(s): Cramps  Myalgia      Simvastatin Other (comments)     Other reaction(s): Cramps  myalgia         Past Surgical History:   Procedure Laterality Date    HX GI      COLONOSCOPY    HX OTHER SURGICAL      KELOIDS--BACK    HX PROSTATECTOMY      PROSTATE BIOPSY    HX SPLENECTOMY  1969    adhesions----1997       Social History     Social History    Marital status:      Spouse name: N/A    Number of children: N/A    Years of education: N/A     Social History Main Topics    Smoking status: Former Smoker     Packs/day: 0.25     Years: 40.00     Types: Cigarettes     Quit date: 2/9/2015    Smokeless tobacco: Never Used      Comment: smokes 1/2 pack week    Alcohol use 3.0 oz/week     6 Cans of beer per week      Comment: moderately    Drug use: No    Sexual activity: Yes     Other Topics Concern    None     Social History Narrative     Review of Systems   Constitutional: Negative. Negative for chills, fever, malaise/fatigue and weight loss. HENT: Negative. Negative for hearing loss. Eyes: Negative. Negative for blurred vision and double vision. Respiratory: Negative.   Negative for cough, hemoptysis, sputum production and shortness of breath. Cardiovascular: Negative. Negative for chest pain, palpitations and orthopnea. Gastrointestinal: Negative. Negative for abdominal pain, blood in stool, heartburn, nausea and vomiting. Genitourinary: Negative. Negative for dysuria, frequency and urgency. Musculoskeletal: Negative. Negative for back pain, myalgias and neck pain. Left shoulder hand syndrome. Skin: Negative. Negative for rash. Neurological: Negative. Negative for dizziness, tingling, tremors, weakness and headaches. Endo/Heme/Allergies: Negative. Psychiatric/Behavioral: Negative. Negative for depression. Objective:     Vitals:    04/23/18 0823 04/23/18 0843   BP: 144/79 134/78   Pulse: (!) 55 (!) 54   Resp: 16    Temp: 98 °F (36.7 °C)    TempSrc: Oral    SpO2: 100%    Weight: 200 lb 3.2 oz (90.8 kg)    Height: 5' 11\" (1.803 m)       Body mass index is 27.92 kg/(m^2). Physical Exam      Health Maintenance Due   Topic Date Due    GLAUCOMA SCREENING Q2Y  03/18/2017         Assessment and orders:     Encounter Diagnoses     ICD-10-CM ICD-9-CM   1. Mixed hyperlipidemia E78.2 272.2   2. Cerebrovascular accident (CVA), unspecified mechanism ([facility] I63.9 434.91   3. Essential hypertension I10 401.9   4. Hypothyroidism due to acquired atrophy of thyroid E03.4 244.8     246.8   5. Spinal stenosis of cervical region M48.02 723.0   6. Shoulder-hand syndrome M89.09 337.9   7. Cellulitis of left arm L03.114 682.3     Diagnoses and all orders for this visit:    1. Mixed hyperlipidemia-recheck. See above discussion.  -     LIPID PANEL  -     METABOLIC PANEL, COMPREHENSIVE    2. Cerebrovascular accident (CVA), unspecified mechanism (HCC)-no recurrence  -     LIPID PANEL  -     METABOLIC PANEL, COMPREHENSIVE    3. Essential hypertension-controlled  -     LIPID PANEL  -     METABOLIC PANEL, COMPREHENSIVE  -     HEMOGLOBIN    4.  Hypothyroidism due to acquired atrophy of thyroid-retest  -     T4, FREE  - TSH 3RD GENERATION    5. Spinal stenosis of cervical region-resolved    6. Shoulder-hand syndrome-left arm, unlikely to achieve any more strength or range of motion    7. Cellulitis of left arm-treated and improved        Plan of care:  Discussed diagnoses in detail with patient. Medication risks/benefits/side effects discussed with patient. All of the patient's questions were addressed. The patient understands and agrees with our plan of care. The patient knows to call back if they are unsure of or forget any changes we discussed today or if the symptoms change. The patient received an After-Visit Summary which contains VS, orders, medication list and allergy list. This can be used as a \"mini-medical record\" should they have to seek medical care while out of town. Patient Care Team:  Marquita Reyna MD as PCP - General (Family Practice)  Sigrid Ramirez MD (Orthopedic Surgery)  Shobha Archer MD as Physician (Gastroenterology)  Criss Antunez RN as [place], [place] (Optometry)    Follow-up Disposition:  Return in about 4 months (around 8/23/2018). No future appointments.     Signed By: Marquita Reyna MD     April 23, 2018

## 2018-04-24 LAB
ALBUMIN SERPL-MCNC: 4.2 G/DL (ref 3.6–4.8)
ALBUMIN/GLOB SERPL: 1.2 {RATIO} (ref 1.2–2.2)
ALP SERPL-CCNC: 75 IU/L (ref 39–117)
ALT SERPL-CCNC: 16 IU/L (ref 0–44)
AST SERPL-CCNC: 18 IU/L (ref 0–40)
BILIRUB SERPL-MCNC: 0.6 MG/DL (ref 0–1.2)
BUN SERPL-MCNC: 10 MG/DL (ref 8–27)
BUN/CREAT SERPL: 13 (ref 10–24)
CALCIUM SERPL-MCNC: 10.1 MG/DL (ref 8.6–10.2)
CHLORIDE SERPL-SCNC: 104 MMOL/L (ref 96–106)
CHOLEST SERPL-MCNC: 184 MG/DL (ref 100–199)
CO2 SERPL-SCNC: 23 MMOL/L (ref 18–29)
CREAT SERPL-MCNC: 0.78 MG/DL (ref 0.76–1.27)
GFR SERPLBLD CREATININE-BSD FMLA CKD-EPI: 109 ML/MIN/1.73
GFR SERPLBLD CREATININE-BSD FMLA CKD-EPI: 94 ML/MIN/1.73
GLOBULIN SER CALC-MCNC: 3.6 G/DL (ref 1.5–4.5)
GLUCOSE SERPL-MCNC: 90 MG/DL (ref 65–99)
HDLC SERPL-MCNC: 53 MG/DL
HGB BLD-MCNC: 12.9 G/DL (ref 13–17.7)
LDLC SERPL CALC-MCNC: 116 MG/DL (ref 0–99)
POTASSIUM SERPL-SCNC: 4.6 MMOL/L (ref 3.5–5.2)
PROT SERPL-MCNC: 7.8 G/DL (ref 6–8.5)
SODIUM SERPL-SCNC: 140 MMOL/L (ref 134–144)
T4 FREE SERPL-MCNC: 1.44 NG/DL (ref 0.82–1.77)
TRIGL SERPL-MCNC: 77 MG/DL (ref 0–149)
TSH SERPL DL<=0.005 MIU/L-ACNC: 1.37 UIU/ML (ref 0.45–4.5)
VLDLC SERPL CALC-MCNC: 15 MG/DL (ref 5–40)

## 2018-07-10 ENCOUNTER — OFFICE VISIT (OUTPATIENT)
Dept: FAMILY MEDICINE CLINIC | Age: 68
End: 2018-07-10

## 2018-07-10 VITALS
TEMPERATURE: 98.7 F | SYSTOLIC BLOOD PRESSURE: 136 MMHG | HEIGHT: 71 IN | OXYGEN SATURATION: 96 % | WEIGHT: 203 LBS | DIASTOLIC BLOOD PRESSURE: 83 MMHG | HEART RATE: 68 BPM | RESPIRATION RATE: 20 BRPM | BODY MASS INDEX: 28.42 KG/M2

## 2018-07-10 DIAGNOSIS — Z78.9 POOR HISTORIAN: ICD-10-CM

## 2018-07-10 DIAGNOSIS — J30.89 ENVIRONMENTAL AND SEASONAL ALLERGIES: Primary | ICD-10-CM

## 2018-07-10 DIAGNOSIS — L98.9 SKIN LESIONS: ICD-10-CM

## 2018-07-10 RX ORDER — CETIRIZINE HCL 10 MG
10 TABLET ORAL DAILY
Qty: 30 TAB | Refills: 2 | Status: SHIPPED | OUTPATIENT
Start: 2018-07-10 | End: 2019-11-22 | Stop reason: ALTCHOICE

## 2018-07-10 NOTE — MR AVS SNAPSHOT
[address] [facility] 
 
 
 2003 A [facility] [address] [address] 
[phone] Patient: Charlette Meckel MRN: [id number] Visit Information Date & Time Provider Department Dept. Phone Encounter #  
 7/10/2018  8:25 AM Florence Jacinto  [facility] [phone] [id number] Follow-up Instructions Return if symptoms worsen or fail to improve, for F/U after surgery evaluation. Your Appointments 8/24/2018  8:00 AM  
ROUTINE CARE with Fabián Kang MD  
[facility]) Appt Note: 4 mnth fu /est pt /refills/labs/HTN  
 2003 A [facility] [address] [address]  
[place] [address] [address] Upcoming Health Maintenance Date Due  
 GLAUCOMA SCREENING Q2Y 3/18/2017 Influenza Age 5 to Adult 8/1/2018 MEDICARE YEARLY EXAM 9/23/2018 Pneumococcal 65+ High/Highest Risk (2 of 2 - PPSV23) 9/1/2020 DTaP/Tdap/Td series (2 - Td) 11/11/2024 COLONOSCOPY 10/4/2026 Allergies as of 7/10/2018  Review Complete On: 7/10/2018 By: Vonnie Whitney LPN Severity Noted Reaction Type Reactions Lipitor [Atorvastatin]  04/27/2010    Other (comments) Other reaction(s): Cramps Myalgia Simvastatin  04/27/2010    Other (comments) Other reaction(s): Cramps 
myalgia Current Immunizations  Reviewed on 10/11/2017 Name Date Influenza High Dose Vaccine PF 10/11/2017 Influenza Vaccine 10/23/2013 Influenza Vaccine (Quad) PF 9/21/2016, 10/16/2015 Influenza Vaccine Split 10/26/2012, 10/21/2011, 11/10/2010, 12/3/2009 Pneumococcal Conjugate (PCV-13) 11/30/2016 Pneumococcal Polysaccharide (PPSV-23) 9/1/2015 Tdap 11/11/2014 Not reviewed this visit You Were Diagnosed With   
  
 Codes Comments  Environmental and seasonal allergies    -  Primary ICD-10-CM: J30.89 
 ICD-9-CM: 477.8 Abscess of skin of abdomen     ICD-10-CM: L02.211 ICD-9-CM: 849. 2 Vitals BP Pulse Temp Resp Height(growth percentile) Weight(growth percentile) 136/83 (BP 1 Location: Left arm, BP Patient Position: Sitting) 68 98.7 °F (37.1 °C) (Oral) 20 5' 11\" (1.803 m) 203 lb (92.1 kg) SpO2 BMI Smoking Status 96% 28.31 kg/m2 Former Smoker Vitals History BMI and BSA Data Body Mass Index Body Surface Area  
 28.31 kg/m 2 2.15 m 2 Preferred Pharmacy Pharmacy Name Phone [address] [place], [place] - [address] [phone] Your Updated Medication List  
  
   
This list is accurate as of 7/10/18  9:36 AM.  Always use your most recent med list. amLODIPine 2.5 mg tablet Commonly known as:  Arlyss Renee TAKE ONE TABLET BY MOUTH EVERY DAY  
  
 aspirin 325 mg tablet Commonly known as:  ASPIRIN Take 1 Tab by mouth daily. avanafil 100 mg tablet Commonly known as:  AWYNUKZ Take  by mouth as needed for Other. cetirizine 10 mg tablet Commonly known as:  ZYRTEC Take 1 Tab by mouth daily. FISH OIL 1,000 mg Cap Generic drug:  omega-3 fatty acids-vitamin e Take 1 Cap by mouth two (2) times a day. fluticasone 50 mcg/actuation nasal spray Commonly known as:  FLONASE  
USE 2 SPRAYS IN EACH NOSTRIL DAILY * magnesium oxide 400 mg tablet Commonly known as:  MAG-OX Take 400 mg by mouth daily. * magnesium oxide 400 mg tablet Commonly known as:  MAG-OXIDE Take 1 Tab by mouth daily. ondansetron 8 mg disintegrating tablet Commonly known as:  ZOFRAN ODT Take 1 Tab by mouth every eight (8) hours as needed for Nausea. pravastatin 40 mg tablet Commonly known as:  PRAVACHOL  
TAKE ONE TABLET BY MOUTH AT NIGHT ON MONDAY, [facility] & FRIDAY Indications: hyperlipidemia  
  
 promethazine 25 mg tablet Commonly known as:  PHENERGAN  
 Take 1 Tab by mouth every eight (8) hours as needed for Nausea. Senna 8.6 mg tablet Generic drug:  senna Take 1 Tab by mouth daily. SYNTHROID 150 mcg tablet Generic drug:  levothyroxine TAKE 1 TABLET BY MOUTH DAILY BEFORE BREAKFAST  
  
 VITAMIN B-12 1,000 mcg tablet Generic drug:  cyanocobalamin Take 1,000 mcg by mouth daily. * Notice: This list has 2 medication(s) that are the same as other medications prescribed for you. Read the directions carefully, and ask your doctor or other care provider to review them with you. Prescriptions Sent to Pharmacy Refills  
 cetirizine (ZYRTEC) 10 mg tablet 2 Sig: Take 1 Tab by mouth daily. Class: Normal  
 Pharmacy: [address] [place] [address] #: [phone] Route: Oral  
  
We Performed the Following REFERRAL TO GENERAL SURGERY [REF27 Custom] Comments:  
 Please evaluate this patient for chronic abscess of the abdomen skin. Follow-up Instructions Return if symptoms worsen or fail to improve, for F/U after surgery evaluation. Referral Information Referral ID Referred By Referred To  
  
 [id number] [place], [address] [place], [address] [facility]  Phone: [phone] Fax: [phone] Visits Status Start Date End Date 1 New Request 7/10/18 7/10/19 If your referral has a status of pending review or denied, additional information will be sent to support the outcome of this decision. Patient Instructions Allergies: Care Instructions Your Care Instructions Allergies occur when your body's defense system (immune system) overreacts to certain substances. The immune system treats a harmless substance as if it were a harmful germ or virus.  Many things can cause this overreaction, including pollens, medicine, food, dust, animal dander, and mold. Allergies can be mild or severe. Mild allergies can be managed with home treatment. But medicine may be needed to prevent problems. Managing your allergies is an important part of staying healthy. Your doctor may suggest that you have allergy testing to help find out what is causing your allergies. When you know what things trigger your symptoms, you can avoid them. This can prevent allergy symptoms and other health problems. For severe allergies that cause reactions that affect your whole body (anaphylactic reactions), your doctor may prescribe a shot of epinephrine to carry with you in case you have a severe reaction. Learn how to give yourself the shot and keep it with you at all times. Make sure it is not . Follow-up care is a key part of your treatment and safety. Be sure to make and go to all appointments, and call your doctor if you are having problems. It's also a good idea to know your test results and keep a list of the medicines you take. How can you care for yourself at home? · If you have been told by your doctor that dust or dust mites are causing your allergy, decrease the dust around your bed: 
[facility] sheets, pillowcases, and other bedding in hot water every week. ¨ Use dust-proof covers for pillows, duvets, and mattresses. Avoid plastic covers because they tear easily and do not \"breathe. \" Wash as instructed on the label. ¨ Do not use any blankets and pillows that you do not need. ¨ Use blankets that you can wash in your washing machine. ¨ Consider removing drapes and carpets, which attract and hold dust, from your bedroom. · If you are allergic to house dust and mites, do not use home humidifiers. Your doctor can suggest ways you can control dust and mites. · Look for signs of cockroaches. Cockroaches cause allergic reactions. Use cockroach baits to get rid of them. Then, clean your home well. Cockroaches like areas where grocery bags, newspapers, empty bottles, or cardboard boxes are stored. Do not keep these inside your home, and keep trash and food containers sealed. Seal off any spots where cockroaches might enter your home. · If you are allergic to mold, get rid of furniture, rugs, and drapes that smell musty. Check for mold in the bathroom. · If you are allergic to outdoor pollen or mold spores, use air-conditioning. Change or clean all filters every month. Keep windows closed. · If you are allergic to pollen, stay inside when pollen counts are high. Use a vacuum  with a HEPA filter or a double-thickness filter at least two times each week. · Stay inside when air pollution is bad. Avoid paint fumes, perfumes, and other strong odors. · Avoid conditions that make your allergies worse. Stay away from smoke. Do not smoke or let anyone else smoke in your house. Do not use fireplaces or wood-burning stoves. · If you are allergic to your pets, change the air filter in your furnace every month. Use high-efficiency filters. · If you are allergic to pet dander, keep pets outside or out of your bedroom. Old carpet and cloth furniture can hold a lot of animal dander. You may need to replace them. When should you call for help? Give an epinephrine shot if: 
? · You think you are having a severe allergic reaction. ? · You have symptoms in more than one body area, such as mild nausea and an itchy mouth. ? After giving an epinephrine shot call 911, even if you feel better. ?Call 911 if: 
? · You have symptoms of a severe allergic reaction. These may include: 
¨ Sudden raised, red areas (hives) all over your body. ¨ Swelling of the throat, mouth, lips, or tongue. ¨ Trouble breathing. ¨ Passing out (losing consciousness). Or you may feel very lightheaded or suddenly feel weak, confused, or restless. ? · You have been given an epinephrine shot, even if you feel better. ?Call your doctor now or seek immediate medical care if: 
? · You have symptoms of an allergic reaction, such as: ¨ A rash or hives (raised, red areas on the skin). ¨ Itching. ¨ Swelling. ¨ Belly pain, nausea, or vomiting. ? Watch closely for changes in your health, and be sure to contact your doctor if: 
? · You do not get better as expected. Where can you learn more? Go to http://rosa-becky.info/. Enter P354 in the search box to learn more about \"Allergies: Care Instructions. \" Current as of: September 29, 2016 Content Version: 11.4 © 2088-2862 [employer]. Care instructions adapted under license by [employer] (which disclaims liability or warranty for this information). If you have questions about a medical condition or this instruction, always ask your healthcare professional. [address] any warranty or liability for your use of this information. Home Blood Pressure Test: About This Test 
What is it? A home blood pressure test allows you to keep track of your blood pressure at home. Blood pressure is a measure of the force of blood against the walls of your arteries. Blood pressure readings include two numbers, such as 130/80 (say \"130 over 80\"). The first number is the systolic pressure. The second number is the diastolic pressure. Why is this test done? You may do this test at home to: · Find out if you have high blood pressure. · Track your blood pressure if you have high blood pressure. · Track how well medicine is working to reduce high blood pressure. · Check how lifestyle changes, such as weight loss and exercise, are affecting blood pressure. How can you prepare for the test? 
· Do not use caffeine, tobacco, or medicines known to raise blood pressure (such as nasal decongestant sprays) for at least 30 minutes before taking your blood pressure. · Do not exercise for at least 30 minutes before taking your blood pressure. What happens before the test? 
Take your blood pressure while you feel comfortable and relaxed. Sit quietly with both feet on the floor for at least 5 minutes before the test. 
What happens during the test? 
· Sit with your arm slightly bent and resting on a table so that your upper arm is at the same level as your heart. · Roll up your sleeve or take off your shirt to expose your upper arm. · Wrap the blood pressure cuff around your upper arm so that the lower edge of the cuff is about 1 inch above the bend of your elbow. Proceed with the following steps depending on if you are using an automatic or manual pressure monitor. Automatic blood pressure monitors · Press the on/off button on the automatic monitor and wait until the ready-to-measure \"heart\" symbol appears next to zero in the display window. · Press the start button. The cuff will inflate and deflate by itself. · Your blood pressure numbers will appear on the screen. · Write your numbers in your log book, along with the date and time. Manual blood pressure monitors · Place the earpieces of a stethoscope in your ears, and place the bell of the stethoscope over the artery, just below the cuff. · Close the valve on the rubber inflating bulb. · Squeeze the bulb rapidly with your opposite hand to inflate the cuff until the dial or column of mercury reads about 30 mm Hg higher than your usual systolic pressure. If you do not know your usual pressure, inflate the cuff to 210 mm Hg or until the pulse at your wrist disappears. · Open the pressure valve just slightly by twisting or pressing the valve on the bulb. · As you watch the pressure slowly fall, note the level on the dial at which you first start to hear a pulsing or tapping sound through the stethoscope. This is your systolic blood pressure. · Continue letting the air out slowly.  The sounds will become muffled and will finally disappear. Note the pressure when the sounds completely disappear. This is your diastolic blood pressure. Let out all the remaining air. · Write your numbers in your log book, along with the date and time. What else should you know about the test? 
Results for adults ages 25 and older (mm Hg): · Normal (ideal): Systolic 210 or below. Diastolic 79 or below. · Prehypertension: Systolic 387 to 501. Diastolic 80 to 89. · Hypertension: Systolic 595 or above. Diastolic 90 or above. Follow-up care is a key part of your treatment and safety. Be sure to make and go to all appointments, and call your doctor if you are having problems. It's also a good idea to keep a list of the medicines you take. Where can you learn more? Go to http://rosa-becky.info/. Enter C427 in the search box to learn more about \"Home Blood Pressure Test: About This Test.\" Current as of: 2016 Content Version: 11.4 © 1316-1013 [employer]. Care instructions adapted under license by [employer] (which disclaims liability or warranty for this information). If you have questions about a medical condition or this instruction, always ask your healthcare professional. [place] [address] any warranty or liability for your use of this information. Mary Ellen [address] Affiliated with [address] [place] [address] 
[phone] Monitor blood pressure outside the office several times weekly at different times during the day and evening. Bring the record to me in 3 weeks for review. Blood Pressure Record Patient Name:  ______________________ :  ______________________ Date/Time BP Reading Pulse Introducing hospitals & HEALTH SERVICES! [employer] introduces [employer] patient portal. Now you can access parts of your medical record, email your doctor's office, and request medication refills online. 1. In your internet browser, go to [email or web address] 2. Click on the First Time User? Click Here link in the Sign In box. You will see the New Member Sign Up page. 3. Enter your [employer] Access Code exactly as it appears below. You will not need to use this code after youve completed the sign-up process. If you do not sign up before the expiration date, you must request a new code. · [employer] Access Code: [email or web address] Expires: 10/8/2018  9:36 AM 
 
4. Enter the last four digits of your Social Security Number (xxxx) and Date of Birth (mm/dd/yyyy) as indicated and click Submit. You will be taken to the next sign-up page. 5. Create a [employer] ID. This will be your [employer] login ID and cannot be changed, so think of one that is secure and easy to remember. 6. Create a [employer] password. You can change your password at any time. 7. Enter your Password Reset Question and Answer. This can be used at a later time if you forget your password. 8. Enter your e-mail address. You will receive e-mail notification when new information is available in [address] 9. Click Sign Up. You can now view and download portions of your medical record. 10. Click the Download Summary menu link to download a portable copy of your medical information. If you have questions, please visit the Frequently Asked Questions section of the [employer] website. Remember, [employer] is NOT to be used for urgent needs. For medical emergencies, dial 911. Now available from your iPhone and Android! Please provide this summary of care documentation to your next provider. Your primary care clinician is listed as [address]. If you have any questions after today's visit, please call [phone].

## 2018-07-10 NOTE — PROGRESS NOTES
[facility] Res[facility] Program  
 
Outpatient Resident Progress Note Encounter Date: 7/10/2018 Chief Complaint Patient presents with  Cold Symptoms  
  cough, nasal congestion, afebrile  Skin Problem Chronic History of Present Illness Patient is a poor historian. Patient is a 79 y.o. male, who presents to clinic for Cold Symptoms (cough, nasal congestion, afebrile) and Skin Problem (Chronic ) Patient presents with Allergy symptoms for the past 7 days. Symptoms include: sinus pressure, clear rhinorrhea, itchy nose, nasal congestion, postnasal drip and dry cough. .  There is no fever or rash. Seasonal or environmental triggers: YES. Severity: tolerable, overall course: symptoms have progressed to a point and plateaued. Modifiers: has not tried OTCs for relief of pain. Trend of symptoms: is unchanged. He also reports 2 skin lesions on his abdomen that become infected on and off since about 6 years. He said that he had an evaluation by a \"dermatologist\" about 3 years ago, but on further inquiry, patient was not sure of which kind of doctor saw the lesions and what kind of treatment he received if any. As of now, he says that they are not tender, and that they don't look infected. Denies erythema, swelling, pain or tenderness, drainage, or any other signs or symptoms of the mentioned areas. Review of Systems (Negative unless in bold) A complete ROS was reviewed and only pertinent items documented in the HPI. Allergies - reviewed: Allergies Allergen Reactions  Lipitor [Atorvastatin] Other (comments) Other reaction(s): Cramps Myalgia  Simvastatin Other (comments) Other reaction(s): Cramps 
myalgia Medications - reviewed:  
Current Outpatient Prescriptions Medication Sig  cetirizine (ZYRTEC) 10 mg tablet Take 1 Tab by mouth daily.   
 SYNTHROID 150 mcg tablet TAKE 1 TABLET BY MOUTH DAILY BEFORE BREAKFAST  aspirin (ASPIRIN) 325 mg tablet Take 1 Tab by mouth daily.  fluticasone (FLONASE) 50 mcg/actuation nasal spray USE 2 SPRAYS IN EACH NOSTRIL DAILY  amLODIPine (NORVASC) 2.5 mg tablet TAKE ONE TABLET BY MOUTH EVERY DAY  pravastatin (PRAVACHOL) 40 mg tablet TAKE ONE TABLET BY MOUTH AT NIGHT ON MONDAY, [facility] & FRIDAY Indications: hyperlipidemia  magnesium oxide (MAG-OX) 400 mg tablet Take 400 mg by mouth daily.  senna (SENNA) 8.6 mg tablet Take 1 Tab by mouth daily.  cyanocobalamin (VITAMIN B-12) 1,000 mcg tablet Take 1,000 mcg by mouth daily.  avanafil (STENDRA) 100 mg tablet Take  by mouth as needed for Other.  omega-3 fatty acids-vitamin e (FISH OIL) 1,000 mg Cap Take 1 Cap by mouth two (2) times a day.  promethazine (PHENERGAN) 25 mg tablet Take 1 Tab by mouth every eight (8) hours as needed for Nausea.  ondansetron (ZOFRAN ODT) 8 mg disintegrating tablet Take 1 Tab by mouth every eight (8) hours as needed for Nausea.  [CANCELED] magnesium oxide (MAG-OXIDE) 400 mg tablet Take 1 Tab by mouth daily. No current facility-administered medications for this visit. Past Medical History - reviewed: 
Past Medical History:  
Diagnosis Date  Benign neoplasm of colon 4/27/2010  Cancer ([address] PROSTATE  
 HTN (hypertension) 11/11/2014  Hypercholesterolemia Sarahi Internal hemorrhoid 4/27/2010  Keloid 4/27/2010  Mixed hyperlipidemia 4/27/2010  Neck mass 4/27/2010  Other ill-defined conditions(799.89) HI CHOLESTEROL  Prostate cancer ([address] 4/17/2011  Smoker  Stroke ([address] Per pt 2014  Thyroid disease HYPO  
 Tobacco use disorder 4/27/2010 Family Medical History - reviewed: 
Family History Problem Relation Age of Onset  Diabetes Father  Asthma Sister  Alcohol abuse Neg Hx  Arthritis-rheumatoid Neg Hx  Bleeding Prob Neg Hx  Cancer Neg Hx  Elevated Lipids Neg Hx   
 Headache Neg Hx   
 Heart Disease Neg Hx  Hypertension Neg Hx  Lung Disease Neg Hx  Migraines Neg Hx  Psychiatric Disorder Neg Hx  Stroke Neg Hx  Mental Retardation Neg Hx Objective Visit Vitals  /83 (BP 1 Location: Left arm, BP Patient Position: Sitting)  Pulse 68  Temp 98.7 °F (37.1 °C) (Oral)  Resp 20  
 Ht 5' 11\" (1.803 m)  Wt 203 lb (92.1 kg)  SpO2 96%  BMI 28.31 kg/m2 Body mass index is 28.31 kg/(m^2). Nursing note and vitals reviewed. Physical Exam 
Constitutional: Well-developed, well-nourished, and in no distress. HENT:  
Head: Normocephalic and atraumatic. Right Ear: External ear normal. Left Ear: External ear normal.  
Nose: Nose normal. Clear nasal secretions. Mouth/Throat: Oropharynx is clear and moist. Oropharyngeal erythema, no exudate. Eyes: Conjunctivae and EOM are normal. Pupils are equal, round, and reactive to light. Right eye exhibits no discharge. Left eye exhibits no discharge. No scleral icterus. Neck: Normal range of motion. Neck supple. No JVD present. No tracheal deviation present. No thyromegaly present. Lymphadenopathy: No cervical adenopathy. Cardiovascular: Normal rate, regular rhythm, normal heart sounds and intact distal pulses. Exam reveals no gallop and no friction rub. No murmur heard. Pulmonary/Chest: Effort normal and breath sounds normal. No respiratory distress. No wheezes, no rales, no tenderness. Abdominal: Soft. Bowel sounds are normal. No distension and no mass. There is no tenderness. There is no rebound and no guarding. Musculoskeletal: Normal range of motion. Exhibits no edema, tenderness or deformity. Neurological: Alert and oriented to person, place, and time. Normal reflexes. No cranial nerve deficit. Gait normal. Coordination normal.  
Skin: Skin is warm and dry. No rash noted. Not diaphoretic. No erythema. No pallor.  Patient have a prominent longitudinal midline abdominal scar that encompass the whole longitude of the abdomen, along with other smaller scars across the abdomen, consistent with an extensive exploratory laparotomy. There are 2 enlarge nodular lesions (one to the Rt and other to the Lt of the midline of the abdomen) with erythema, no drainage, or tenderness. Psychiatric: Mood, memory, affect and judgment normal.  
 
 
Abdominal Abscess 1. Rt of the abdominal midline. Abdominal Abscess 2. Lt of the abdominal midline. Assessment / Plan Mr. Catalina Quinones is a 79 y.o. male with the following medical condition(s): 
 
1. Environmental and seasonal allergies Patient have Flonase at home which was recommended to start using per both nostrils once per day, along with prescribed Zyrtec. - cetirizine (ZYRTEC) 10 mg tablet; Take 1 Tab by mouth daily. Dispense: 30 Tab; Refill: 2 2. Skin lesions of abdomen Patient was referred for evaluation of nodular lesions on the abdomen by general surgery. No Abx prescribed at this time as on PE, though of being impressive, they does not show all signs of a suspected active infection. - [address] [facility] 3. Poor historian Patient had discrepancies on Hx given and actual presentation of both seasonal rhinitis and lesions on abdomen. Patient was advised to return to clinic in case of worsening of symptoms or fail to improve. Life threatening signs and symptoms were discussed and patient advised to go to the nearest ED for prompt evaluation and care in case of onset of any of these. Follow-up Disposition: 
Return if symptoms worsen or fail to improve, for F/U after surgery evaluation. · I have discussed the diagnosis with the patient and the intended plan as seen in the above orders. The patient has received an after-visit summary and questions were answered concerning future plans. I have discussed medication side effects and warnings with the patient as well. Patient/Plan discussed with Dr. Delon Ibanez (Attending Physician) Braulio Chin MD Elise 
PGY-3 Family Medicine Resident Encounter Date: 7/10/2018

## 2018-07-10 NOTE — PATIENT INSTRUCTIONS
Allergies: Care Instructions Your Care Instructions Allergies occur when your body's defense system (immune system) overreacts to certain substances. The immune system treats a harmless substance as if it were a harmful germ or virus. Many things can cause this overreaction, including pollens, medicine, food, dust, animal dander, and mold. Allergies can be mild or severe. Mild allergies can be managed with home treatment. But medicine may be needed to prevent problems. Managing your allergies is an important part of staying healthy. Your doctor may suggest that you have allergy testing to help find out what is causing your allergies. When you know what things trigger your symptoms, you can avoid them. This can prevent allergy symptoms and other health problems. For severe allergies that cause reactions that affect your whole body (anaphylactic reactions), your doctor may prescribe a shot of epinephrine to carry with you in case you have a severe reaction. Learn how to give yourself the shot and keep it with you at all times. Make sure it is not . Follow-up care is a key part of your treatment and safety. Be sure to make and go to all appointments, and call your doctor if you are having problems. It's also a good idea to know your test results and keep a list of the medicines you take. How can you care for yourself at home? · If you have been told by your doctor that dust or dust mites are causing your allergy, decrease the dust around your bed: 
[facility] sheets, pillowcases, and other bedding in hot water every week. ¨ Use dust-proof covers for pillows, duvets, and mattresses. Avoid plastic covers because they tear easily and do not \"breathe. \" Wash as instructed on the label. ¨ Do not use any blankets and pillows that you do not need. ¨ Use blankets that you can wash in your washing machine. ¨ Consider removing drapes and carpets, which attract and hold dust, from your bedroom.  
· If you are allergic to house dust and mites, do not use home humidifiers. Your doctor can suggest ways you can control dust and mites. · Look for signs of cockroaches. Cockroaches cause allergic reactions. Use cockroach baits to get rid of them. Then, clean your home well. Cockroaches like areas where grocery bags, newspapers, empty bottles, or cardboard boxes are stored. Do not keep these inside your home, and keep trash and food containers sealed. Seal off any spots where cockroaches might enter your home. · If you are allergic to mold, get rid of furniture, rugs, and drapes that smell musty. Check for mold in the bathroom. · If you are allergic to outdoor pollen or mold spores, use air-conditioning. Change or clean all filters every month. Keep windows closed. · If you are allergic to pollen, stay inside when pollen counts are high. Use a vacuum  with a HEPA filter or a double-thickness filter at least two times each week. · Stay inside when air pollution is bad. Avoid paint fumes, perfumes, and other strong odors. · Avoid conditions that make your allergies worse. Stay away from smoke. Do not smoke or let anyone else smoke in your house. Do not use fireplaces or wood-burning stoves. · If you are allergic to your pets, change the air filter in your furnace every month. Use high-efficiency filters. · If you are allergic to pet dander, keep pets outside or out of your bedroom. Old carpet and cloth furniture can hold a lot of animal dander. You may need to replace them. When should you call for help? Give an epinephrine shot if: 
? · You think you are having a severe allergic reaction. ? · You have symptoms in more than one body area, such as mild nausea and an itchy mouth. ? After giving an epinephrine shot call 911, even if you feel better. ?Call 911 if: 
? · You have symptoms of a severe allergic reaction. These may include: 
¨ Sudden raised, red areas (hives) all over your body.  
¨ Swelling of the throat, mouth, lips, or tongue. ¨ Trouble breathing. ¨ Passing out (losing consciousness). Or you may feel very lightheaded or suddenly feel weak, confused, or restless. ? · You have been given an epinephrine shot, even if you feel better. ?Call your doctor now or seek immediate medical care if: 
? · You have symptoms of an allergic reaction, such as: ¨ A rash or hives (raised, red areas on the skin). ¨ Itching. ¨ Swelling. ¨ Belly pain, nausea, or vomiting. ? Watch closely for changes in your health, and be sure to contact your doctor if: 
? · You do not get better as expected. Where can you learn more? Go to http://rosa-becky.info/. Enter M483 in the search box to learn more about \"Allergies: Care Instructions. \" Current as of: September 29, 2016 Content Version: 11.4 © 3106-7223 [employer]. Care instructions adapted under license by [employer] (which disclaims liability or warranty for this information). If you have questions about a medical condition or this instruction, always ask your healthcare professional. [place] [address] any warranty or liability for your use of this information. Home Blood Pressure Test: About This Test 
What is it? A home blood pressure test allows you to keep track of your blood pressure at home. Blood pressure is a measure of the force of blood against the walls of your arteries. Blood pressure readings include two numbers, such as 130/80 (say \"130 over 80\"). The first number is the systolic pressure. The second number is the diastolic pressure. Why is this test done? You may do this test at home to: · Find out if you have high blood pressure. · Track your blood pressure if you have high blood pressure. · Track how well medicine is working to reduce high blood pressure. · Check how lifestyle changes, such as weight loss and exercise, are affecting blood pressure.  
How can you prepare for the test? 
· Do not use caffeine, tobacco, or medicines known to raise blood pressure (such as nasal decongestant sprays) for at least 30 minutes before taking your blood pressure. · Do not exercise for at least 30 minutes before taking your blood pressure. What happens before the test? 
Take your blood pressure while you feel comfortable and relaxed. Sit quietly with both feet on the floor for at least 5 minutes before the test. 
What happens during the test? 
· Sit with your arm slightly bent and resting on a table so that your upper arm is at the same level as your heart. · Roll up your sleeve or take off your shirt to expose your upper arm. · Wrap the blood pressure cuff around your upper arm so that the lower edge of the cuff is about 1 inch above the bend of your elbow. Proceed with the following steps depending on if you are using an automatic or manual pressure monitor. Automatic blood pressure monitors · Press the on/off button on the automatic monitor and wait until the ready-to-measure \"heart\" symbol appears next to zero in the display window. · Press the start button. The cuff will inflate and deflate by itself. · Your blood pressure numbers will appear on the screen. · Write your numbers in your log book, along with the date and time. Manual blood pressure monitors · Place the earpieces of a stethoscope in your ears, and place the bell of the stethoscope over the artery, just below the cuff. · Close the valve on the rubber inflating bulb. · Squeeze the bulb rapidly with your opposite hand to inflate the cuff until the dial or column of mercury reads about 30 mm Hg higher than your usual systolic pressure. If you do not know your usual pressure, inflate the cuff to 210 mm Hg or until the pulse at your wrist disappears. · Open the pressure valve just slightly by twisting or pressing the valve on the bulb.  
· As you watch the pressure slowly fall, note the level on the dial at which you first start to hear a pulsing or tapping sound through the stethoscope. This is your systolic blood pressure. · Continue letting the air out slowly. The sounds will become muffled and will finally disappear. Note the pressure when the sounds completely disappear. This is your diastolic blood pressure. Let out all the remaining air. · Write your numbers in your log book, along with the date and time. What else should you know about the test? 
Results for adults ages 25 and older (mm Hg): · Normal (ideal): Systolic 854 or below. Diastolic 79 or below. · Prehypertension: Systolic 683 to 905. Diastolic 80 to 89. · Hypertension: Systolic 820 or above. Diastolic 90 or above. Follow-up care is a key part of your treatment and safety. Be sure to make and go to all appointments, and call your doctor if you are having problems. It's also a good idea to keep a list of the medicines you take. Where can you learn more? Go to http://rosa-becky.info/. Enter C427 in the search box to learn more about \"Home Blood Pressure Test: About This Test.\" Current as of: 2016 Content Version: 11.4 © 2207-6849 [employer]. Care instructions adapted under license by [employer] (which disclaims liability or warranty for this information). If you have questions about a medical condition or this instruction, always ask your healthcare professional. [address] any warranty or liability for your use of this information. [address] Affiliated with [address] [place] [address], [address] 
[phone] Monitor blood pressure outside the office several times weekly at different times during the day and evening. Bring the record to me in 3 weeks for review. Blood Pressure Record Patient Name:  ______________________ :  ______________________ Date/Time BP Reading Pulse

## 2018-07-10 NOTE — PROGRESS NOTES
1. Have you been to the ER, urgent care clinic since your last visit? Hospitalized since your last visit? No    2. Have you seen or consulted any other health care providers outside of the [facility] since your last visit? Include any pap smears or colon screening.  No  Reviewed record in preparation for visit and have necessary documentation  Pt did not bring medication to office visit for review    Goals that were addressed and/or need to be completed during or after this appointment include   Health Maintenance Due   Topic Date Due    GLAUCOMA SCREENING Q2Y  03/18/2017

## 2018-07-12 PROBLEM — Z78.9 POOR HISTORIAN: Status: ACTIVE | Noted: 2018-07-12

## 2018-07-19 ENCOUNTER — TELEPHONE (OUTPATIENT)
Dept: FAMILY MEDICINE CLINIC | Age: 68
End: 2018-07-19

## 2018-07-19 NOTE — TELEPHONE ENCOUNTER
Pt was seen last week and was told to buy a med otc (pt do not remember name) he states that its not working and would like something stronger. .    Please call at [phone]

## 2018-07-23 ENCOUNTER — TELEPHONE (OUTPATIENT)
Dept: FAMILY MEDICINE CLINIC | Age: 68
End: 2018-07-23

## 2018-07-23 DIAGNOSIS — J30.2 SEASONAL ALLERGIC RHINITIS, UNSPECIFIED TRIGGER: Primary | ICD-10-CM

## 2018-07-23 RX ORDER — MINERAL OIL
180 ENEMA (ML) RECTAL
Qty: 30 TAB | Refills: 2 | Status: SHIPPED | OUTPATIENT
Start: 2018-07-23 | End: 2019-11-22 | Stop reason: ALTCHOICE

## 2018-07-23 NOTE — TELEPHONE ENCOUNTER
Called patient to discuss current symptoms of allergic rhinitis. Patient using Zyrtec and Flonase as prescribed in the morning. Will add Allegra at bedtime. Patient advised to return to clinic in case of no improvement. 1. Seasonal allergic rhinitis, unspecified trigger  - fexofenadine (ALLEGRA) 180 mg tablet; Take 1 Tab by mouth nightly. Dispense: 30 Tab;  Refill: 2    Ayla De Paz MD  PGY-3 Family Medicine Resident

## 2018-07-27 ENCOUNTER — TELEPHONE (OUTPATIENT)
Dept: FAMILY MEDICINE CLINIC | Age: 68
End: 2018-07-27

## 2018-07-27 NOTE — TELEPHONE ENCOUNTER
Pt saw Dr. Christian Valero and was sent to Nilesh Sprague MD. @ [facility] [place]. He may have to be put under if he has to have surgery. They need to know what kind of allergies he has. Please call Pt so he will need to know what to actually say. They want to know what kind of allergies he has. He has another question as well.  Please call him on home phone first.

## 2018-07-31 ENCOUNTER — OFFICE VISIT (OUTPATIENT)
Dept: FAMILY MEDICINE CLINIC | Age: 68
End: 2018-07-31

## 2018-07-31 VITALS
DIASTOLIC BLOOD PRESSURE: 81 MMHG | WEIGHT: 206.2 LBS | BODY MASS INDEX: 28.87 KG/M2 | HEART RATE: 70 BPM | TEMPERATURE: 98.4 F | RESPIRATION RATE: 18 BRPM | HEIGHT: 71 IN | OXYGEN SATURATION: 96 % | SYSTOLIC BLOOD PRESSURE: 138 MMHG

## 2018-07-31 DIAGNOSIS — Z01.818 PREOP GENERAL PHYSICAL EXAM: Primary | ICD-10-CM

## 2018-07-31 NOTE — PROGRESS NOTES
[facility]     Outpatient Resident Progress Note    Encounter Date: 7/31/2018    Chief Complaint   Patient presents with    Allergies       History of Present Illness    Patient is a 79 y.o. male, who presents to clinic for Allergies  Patient presents to clinic for a PreOp work up. Patient is undergoing a drainage of 2 abscesses with possible foreign anum presence on 8/10/18. Today patient have no complains and reports to feel well. Denies rash, fever, fatigue, dizziness, lightheadedness, headaches, changes in vision, cough, sore throat, CP, SOB, sputum production, abdominal pain or tenderness, nausea, vomiting, dysuria, hematuria, diarrhea, melena, hematochezia, leg swelling, or any other complains at this moment. Review of Systems (Negative unless in bold)    A complete ROS was reviewed and only pertinent items documented on HPI. Allergies - reviewed: Allergies   Allergen Reactions    Lipitor [Atorvastatin] Other (comments)     Other reaction(s): Cramps  Myalgia      Simvastatin Other (comments)     Other reaction(s): Cramps  myalgia         Medications - reviewed:   Current Outpatient Prescriptions   Medication Sig    fexofenadine (ALLEGRA) 180 mg tablet Take 1 Tab by mouth nightly.  cetirizine (ZYRTEC) 10 mg tablet Take 1 Tab by mouth daily.  SYNTHROID 150 mcg tablet TAKE 1 TABLET BY MOUTH DAILY BEFORE BREAKFAST    aspirin (ASPIRIN) 325 mg tablet Take 1 Tab by mouth daily.  fluticasone (FLONASE) 50 mcg/actuation nasal spray USE 2 SPRAYS IN EACH NOSTRIL DAILY    amLODIPine (NORVASC) 2.5 mg tablet TAKE ONE TABLET BY MOUTH EVERY DAY    pravastatin (PRAVACHOL) 40 mg tablet TAKE ONE TABLET BY MOUTH AT NIGHT ON MONDAY, [facility] & FRIDAY Indications: hyperlipidemia    magnesium oxide (MAG-OX) 400 mg tablet Take 400 mg by mouth daily.  senna (SENNA) 8.6 mg tablet Take 1 Tab by mouth daily.     cyanocobalamin (VITAMIN B-12) 1,000 mcg tablet Take 1,000 mcg by mouth daily.  avanafil (STENDRA) 100 mg tablet Take  by mouth as needed for Other.  omega-3 fatty acids-vitamin e (FISH OIL) 1,000 mg Cap Take 1 Cap by mouth two (2) times a day.  [CANCELED] magnesium oxide (MAG-OXIDE) 400 mg tablet Take 1 Tab by mouth daily. No current facility-administered medications for this visit. Past Medical History - reviewed:  Past Medical History:   Diagnosis Date    Benign neoplasm of colon 4/27/2010    Cancer ([facility] [address]     PROSTATE    HTN (hypertension) 11/11/2014    Hypercholesterolemia     Internal hemorrhoid 4/27/2010    Keloid 4/27/2010    Mixed hyperlipidemia 4/27/2010    Neck mass 4/27/2010    Other ill-defined conditions(799.89)     HI CHOLESTEROL    Prostate cancer ([facility] [address] 4/17/2011    Smoker     Stroke [facility])     Per pt 2014    Thyroid disease     HYPO    Tobacco use disorder 4/27/2010       Family Medical History - reviewed:  Family History   Problem Relation Age of Onset    Diabetes Father     Asthma Sister     Alcohol abuse Neg Hx     Arthritis-rheumatoid Neg Hx     Bleeding Prob Neg Hx     Cancer Neg Hx     Elevated Lipids Neg Hx     Headache Neg Hx     Heart Disease Neg Hx     Hypertension Neg Hx     Lung Disease Neg Hx     Migraines Neg Hx     Psychiatric Disorder Neg Hx     Stroke Neg Hx     Mental Retardation Neg Hx        Objective  Visit Vitals    /81 (BP 1 Location: Left arm, BP Patient Position: Sitting)    Pulse 70    Temp 98.4 °F (36.9 °C) (Oral)    Resp 18    Ht 5' 11\" (1.803 m)    Wt 206 lb 3.2 oz (93.5 kg)    SpO2 96%    BMI 28.76 kg/m2     Body mass index is 28.76 kg/(m^2). Nursing note and vitals reviewed. Physical Exam  Constitutional: Well-developed, well-nourished, and in no distress. HENT:   Head: Normocephalic and atraumatic.  Right Ear: External ear normal. Left Ear: External ear normal.   Nose: Nose normal.   Mouth/Throat: Oropharynx is clear and moist. No oropharyngeal erythema, no exudate. Eyes: Conjunctivae and EOM are normal. Pupils are equal, round, and reactive to light. Right eye exhibits no discharge. Left eye exhibits no discharge. No scleral icterus. Neck: Normal range of motion. Neck supple. No JVD present. No tracheal deviation present. No thyromegaly present. Lymphadenopathy: No cervical adenopathy. Cardiovascular: Normal rate, regular rhythm, normal heart sounds and intact distal pulses. Exam reveals no gallop and no friction rub. No murmur heard. Pulmonary/Chest: Effort normal and breath sounds normal. No respiratory distress. No wheezes, no rales, no tenderness. Abdominal: Soft. Bowel sounds are normal. No distension and no mass. There is no tenderness. There is no rebound and no guarding. Musculoskeletal: Normal range of motion. Exhibits no edema, tenderness or deformity. Neurological: Alert and oriented to person, place, and time. Normal reflexes. No cranial nerve deficit. Gait normal. Coordination normal.   Skin: Skin is warm and dry. No rash noted. Not diaphoretic. No erythema. No pallor. Patient have a prominent longitudinal midline abdominal scar that encompass the whole longitude of the abdomen, along with other smaller scars across the abdomen, consistent with an extensive exploratory laparotomy. There are 2 enlarge nodular lesions (one to the Rt and other to the Lt of the midline of the abdomen) with erythema, no drainage, or tenderness. Psychiatric: Mood, memory, affect and judgment normal.     Assessment / Plan   Mr. Chandra Ross is a 79 y.o. male with the following medical condition(s):    1. Preop general physical exam  Patient medically stable for procedure. Follow-up Disposition:  Return in about 1 month (around 8/31/2018), or as necessary, for Follow up after surgery. · I have discussed the diagnosis with the patient and the intended plan as seen in the above orders.   The patient has received an after-visit summary and questions were answered concerning future plans. I have discussed medication side effects and warnings with the patient as well.       Patient/Plan discussed with Dr. Kinsey Cosby (Attending Physician)      Delfin Mccord MD  PGY-3 Family Medicine Resident  Encounter Date: 7/31/2018

## 2018-07-31 NOTE — PATIENT INSTRUCTIONS

## 2018-07-31 NOTE — PROGRESS NOTES
Chief Complaint   Patient presents with    Allergies     1. Have you been to the ER, urgent care clinic since your last visit? Hospitalized since your last visit? No    2. Have you seen or consulted any other health care providers outside of the [address] [place] [address] since your last visit? Include any pap smears or colon screening.  No

## 2018-08-11 DIAGNOSIS — E78.2 MIXED HYPERLIPIDEMIA: Chronic | ICD-10-CM

## 2018-08-11 DIAGNOSIS — E78.00 PURE HYPERCHOLESTEROLEMIA: ICD-10-CM

## 2018-08-12 RX ORDER — PRAVASTATIN SODIUM 40 MG/1
TABLET ORAL
Qty: 12 TAB | Refills: 5 | Status: SHIPPED | OUTPATIENT
Start: 2018-08-12 | End: 2018-08-28

## 2018-08-24 ENCOUNTER — OFFICE VISIT (OUTPATIENT)
Dept: FAMILY MEDICINE CLINIC | Age: 68
End: 2018-08-24

## 2018-08-24 VITALS
HEART RATE: 57 BPM | RESPIRATION RATE: 20 BRPM | WEIGHT: 203.4 LBS | SYSTOLIC BLOOD PRESSURE: 125 MMHG | HEIGHT: 71 IN | TEMPERATURE: 97.9 F | DIASTOLIC BLOOD PRESSURE: 78 MMHG | BODY MASS INDEX: 28.48 KG/M2 | OXYGEN SATURATION: 98 %

## 2018-08-24 DIAGNOSIS — Z83.3 FAMILY HISTORY OF DIABETES MELLITUS: ICD-10-CM

## 2018-08-24 DIAGNOSIS — E03.4 HYPOTHYROIDISM DUE TO ACQUIRED ATROPHY OF THYROID: Chronic | ICD-10-CM

## 2018-08-24 DIAGNOSIS — M89.09 SHOULDER-HAND SYNDROME: ICD-10-CM

## 2018-08-24 DIAGNOSIS — E78.2 MIXED HYPERLIPIDEMIA: Primary | Chronic | ICD-10-CM

## 2018-08-24 DIAGNOSIS — I10 ESSENTIAL HYPERTENSION: Chronic | ICD-10-CM

## 2018-08-24 DIAGNOSIS — M48.02 SPINAL STENOSIS OF CERVICAL REGION: ICD-10-CM

## 2018-08-24 DIAGNOSIS — C61 PROSTATE CANCER (HCC): Chronic | ICD-10-CM

## 2018-08-24 DIAGNOSIS — I63.9 CEREBROVASCULAR ACCIDENT (CVA), UNSPECIFIED MECHANISM (HCC): ICD-10-CM

## 2018-08-24 NOTE — PROGRESS NOTES
1. Have you been to the ER, urgent care clinic since your last visit? Hospitalized since your last visit? No    2. Have you seen or consulted any other health care providers outside of the [facility] since your last visit? Include any pap smears or colon screening.  No  Reviewed record in preparation for visit and have necessary documentation  Pt did not bring medication to office visit for review  Goals that were addressed and/or need to be completed during or after this appointment include     Health Maintenance Due   Topic Date Due    GLAUCOMA SCREENING Q2Y  03/18/2017    Influenza Age 5 to Adult  08/01/2018

## 2018-08-24 NOTE — PATIENT INSTRUCTIONS

## 2018-08-24 NOTE — PROGRESS NOTES
Progress Note    Patient: Fan Barber MRN: [id number]  SSN: [id number]    YOB: 1950  Age: 79 y.o. Sex: male        Chief Complaint   Patient presents with    Hypertension     4 month follow up          Subjective:     Encounter Diagnoses   Name Primary?  Mixed hyperlipidemia:  Cardiovascular risks for him are: LDL goal is under 100  hypertension  prior CVA/TIA or known carotid artery disease. Key Antihyperlipidemia Meds             pravastatin (PRAVACHOL) 40 mg tablet  (Taking) TAKE ONE TABLET BY MOUTH AT NIGHT ON MONDAY, WEDNESDAY & FRIDAY Indications: hyperlipidemia    omega-3 fatty acids-vitamin e (FISH OIL) 1,000 mg Cap  (Taking) Take 1 Cap by mouth two (2) times a day. Lab Results   Component Value Date/Time    Cholesterol, total 184 04/23/2018 12:01 PM    HDL Cholesterol 53 04/23/2018 12:01 PM    LDL, calculated 116 (H) 04/23/2018 12:01 PM    Triglyceride 77 04/23/2018 12:01 PM    CHOL/HDL Ratio 3.9 11/10/2010 01:21 AM     Lab Results   Component Value Date/Time    ALT (SGPT) 16 04/23/2018 12:01 PM    AST (SGOT) 18 04/23/2018 12:01 PM    Alk. phosphatase 75 04/23/2018 12:01 PM    Bilirubin, total 0.6 04/23/2018 12:01 PM      Myalgias: No   Fatigue: No   Other side effects: no  Wt Readings from Last 3 Encounters:   08/24/18 203 lb 6.4 oz (92.3 kg)   07/31/18 206 lb 3.2 oz (93.5 kg)   07/10/18 203 lb (92.1 kg)     The patient is aware of our goal to reduce or eliminate the long term problems (such as strokes and heart attacks) related to poorly controlled hyperlipidemia. Yes    Essential hypertension:  BP Readings from Last 3 Encounters:   08/24/18 125/78   07/31/18 138/81   07/10/18 136/83     The patient reports:  taking medications as instructed, no medication side effects noted, no TIA's, no chest pain on exertion, no dyspnea on exertion, no swelling of ankles.     Key CAD CHF Meds             pravastatin (PRAVACHOL) 40 mg tablet  (Taking) TAKE ONE TABLET BY MOUTH AT NIGHT ON MONDAY, [address] Indications: hyperlipidemia    aspirin (ASPIRIN) 325 mg tablet  (Taking) Take 1 Tab by mouth daily. amLODIPine (NORVASC) 2.5 mg tablet  (Taking) TAKE ONE TABLET BY MOUTH EVERY DAY    omega-3 fatty acids-vitamin e (FISH OIL) 1,000 mg Cap  (Taking) Take 1 Cap by mouth two (2) times a day. Lab Results   Component Value Date/Time    Sodium 140 04/23/2018 12:01 PM    Potassium 4.6 04/23/2018 12:01 PM    Chloride 104 04/23/2018 12:01 PM    CO2 23 04/23/2018 12:01 PM    Anion gap 6 05/24/2011 02:28 PM    Glucose 90 04/23/2018 12:01 PM    BUN 10 04/23/2018 12:01 PM    Creatinine 0.78 04/23/2018 12:01 PM    BUN/Creatinine ratio 13 04/23/2018 12:01 PM    GFR est  04/23/2018 12:01 PM    GFR est non-AA 94 04/23/2018 12:01 PM    Calcium 10.1 04/23/2018 12:01 PM    Bilirubin, total 0.6 04/23/2018 12:01 PM    AST (SGOT) 18 04/23/2018 12:01 PM    Alk. phosphatase 75 04/23/2018 12:01 PM    Protein, total 7.8 04/23/2018 12:01 PM    Albumin 4.2 04/23/2018 12:01 PM    Globulin 3.9 05/24/2011 02:28 PM    A-G Ratio 1.2 04/23/2018 12:01 PM    ALT (SGPT) 16 04/23/2018 12:01 PM     Low salt diet? yes  Aerobic exercise? no  Our goal is to normalize the blood pressure to decrease the risks of strokes and heart attacks. The patient is in agreement with the plan.  Hypothyroidism due to acquired atrophy of thyroid:  Lab Results   Component Value Date/Time    TSH 1.370 04/23/2018 12:01 PM    T4, Free 1.44 04/23/2018 12:01 PM    T4, Total 9.6 11/10/2010 01:21 AM      Denies fatigue, nervousness,weight changes, heat orcold intolerance, bowel changes,skin changes, cardiovascular symptoms, hair loss, feeling excessive energy, tremor, palpitations and weight loss. Thyroid medication has been unchanged since last medication check and labs.  Spinal stenosis of cervical region: No new symptoms status post surgery.  Shoulder-hand syndrome: Better use of his left arm now.  Family history of diabetes mellitus: Needs A1c testing. Current and past medical information:    Current Medications after this visit[de-identified]   Current Outpatient Prescriptions   Medication Sig    pravastatin (PRAVACHOL) 40 mg tablet TAKE ONE TABLET BY MOUTH AT NIGHT ON MONDAY, [facility] & FRIDAY Indications: hyperlipidemia    fexofenadine (ALLEGRA) 180 mg tablet Take 1 Tab by mouth nightly.  cetirizine (ZYRTEC) 10 mg tablet Take 1 Tab by mouth daily.  SYNTHROID 150 mcg tablet TAKE 1 TABLET BY MOUTH DAILY BEFORE BREAKFAST    aspirin (ASPIRIN) 325 mg tablet Take 1 Tab by mouth daily.  fluticasone (FLONASE) 50 mcg/actuation nasal spray USE 2 SPRAYS IN EACH NOSTRIL DAILY    amLODIPine (NORVASC) 2.5 mg tablet TAKE ONE TABLET BY MOUTH EVERY DAY    magnesium oxide (MAG-OX) 400 mg tablet Take 400 mg by mouth daily.  senna (SENNA) 8.6 mg tablet Take 1 Tab by mouth daily.  cyanocobalamin (VITAMIN B-12) 1,000 mcg tablet Take 1,000 mcg by mouth daily.  avanafil (STENDRA) 100 mg tablet Take  by mouth as needed for Other.  omega-3 fatty acids-vitamin e (FISH OIL) 1,000 mg Cap Take 1 Cap by mouth two (2) times a day.  [CANCELED] magnesium oxide (MAG-OXIDE) 400 mg tablet Take 1 Tab by mouth daily. No current facility-administered medications for this visit.         Patient Active Problem List    Diagnosis Date Noted    Mixed hyperlipidemia 04/27/2010     Priority: 1 - One    Benign neoplasm of colon 04/27/2010     Priority: 1 - One    Tobacco use disorder 04/27/2010     Priority: 1 - One    Keloid 04/27/2010     Priority: 1 - One    Internal hemorrhoid 04/27/2010     Priority: 1 - One    Thyroglossal duct cyst 09/20/2010     Priority: 3 - Three    Poor historian 07/12/2018    Spinal stenosis of cervical region 04/20/2018    Hypercholesterolemia 04/20/2018    Pain 04/20/2018    Shoulder-hand syndrome 04/20/2018    Weakness 04/20/2018    History of CVA (cerebrovascular accident) 05/22/2015    Cerebrovascular accident (CVA) ([facility] [address] 02/01/2015    Hypertension 11/11/2014    Prostate cancer ([facility] [address] 04/17/2011    Hypothyroidism 09/25/2010    Boil 09/20/2010       Past Medical History:   Diagnosis Date    Benign neoplasm of colon 4/27/2010    Cancer ([facility] [address]     PROSTATE    HTN (hypertension) 11/11/2014    Hypercholesterolemia     Internal hemorrhoid 4/27/2010    Keloid 4/27/2010    Mixed hyperlipidemia 4/27/2010    Neck mass 4/27/2010    Other ill-defined conditions(799.89)     HI CHOLESTEROL    Prostate cancer ([facility] [address] 4/17/2011    Smoker     [facility]     Per pt [facility]    Thyroid disease     HYPO    Tobacco use disorder 4/27/2010       Allergies   Allergen Reactions    Lipitor [Atorvastatin] Other (comments)     Other reaction(s): Cramps  Myalgia      Simvastatin Other (comments)     Other reaction(s): Cramps  myalgia         Past Surgical History:   Procedure Laterality Date    HX GI      COLONOSCOPY    HX OTHER SURGICAL      KELOIDS--BACK    HX PROSTATECTOMY      PROSTATE BIOPSY    HX SPLENECTOMY  1969    adhesions----1997       Social History     Social History    Marital status:      Spouse name: N/A    Number of children: N/A    Years of education: N/A     Social History Main Topics    Smoking status: Former Smoker     Packs/day: 0.25     Years: 40.00     Types: Cigarettes     Quit date: 2/9/2015    Smokeless tobacco: Never Used      Comment: smokes 1/2 pack week    Alcohol use 3.0 oz/week     6 Cans of beer per week      Comment: moderately    Drug use: No    Sexual activity: Yes     Other Topics Concern    None     Social History Narrative       Review of Systems   Constitutional: Negative. Negative for chills, fever, malaise/fatigue and weight loss. HENT: Negative. Negative for hearing loss. Eyes: Negative. Negative for blurred vision and double vision. Respiratory: Negative.   Negative for cough, hemoptysis, sputum production and shortness of breath. Cardiovascular: Negative. Negative for chest pain, palpitations and orthopnea. Gastrointestinal: Negative. Negative for abdominal pain, blood in stool, heartburn, nausea and vomiting. Genitourinary: Negative. Negative for dysuria, frequency and urgency. Musculoskeletal: Negative. Negative for back pain, myalgias and neck pain. Weakness left arm   Skin: Negative. Negative for rash. Neurological: Negative. Negative for dizziness, tingling, tremors, weakness and headaches. Endo/Heme/Allergies: Negative. Psychiatric/Behavioral: Negative. Negative for depression. Objective:     Vitals:    08/24/18 0803   BP: 125/78   Pulse: (!) 57   Resp: 20   Temp: 97.9 °F (36.6 °C)   TempSrc: Oral   SpO2: 98%   Weight: 203 lb 6.4 oz (92.3 kg)   Height: 5' 11\" (1.803 m)      Body mass index is 28.37 kg/(m^2). Physical Exam   Constitutional: He is oriented to person, place, and time and well-developed, well-nourished, and in no distress. HENT:   Head: Normocephalic and atraumatic. Mouth/Throat: Oropharynx is clear and moist.   Eyes: Right eye exhibits no discharge. Left eye exhibits no discharge. No scleral icterus. Neck: No tracheal deviation present. No thyromegaly present. No bruit. Cardiovascular: Normal rate, regular rhythm and normal heart sounds. Pulmonary/Chest: Effort normal and breath sounds normal.   Abdominal: Soft. He exhibits no distension. He has recurrent stitch abscesses. He was seen by surgery and evaluate for this. No procedures were recommended. He is worried about anesthesia. Musculoskeletal:   He can elevate his left arm to approximately 60°. He has atrophy of his shoulder muscles. Neurological: He is alert and oriented to person, place, and time. Skin: No rash noted. No erythema. Psychiatric: Mood and affect normal.   Nursing note and vitals reviewed.         Health Maintenance Due   Topic Date Due    GLAUCOMA SCREENING Q2Y 03/18/2017    Influenza Age 9 to Adult  08/01/2018         Assessment and orders:     Encounter Diagnoses     ICD-10-CM ICD-9-CM   1. Mixed hyperlipidemia E78.2 272.2   2. Essential hypertension I10 401.9   3. Hypothyroidism due to acquired atrophy of thyroid E03.4 244.8     246.8   4. Spinal stenosis of cervical region M48.02 723.0   5. Shoulder-hand syndrome M89.09 337.9   6. Family history of diabetes mellitus Z83.3 V18.0     Diagnoses and all orders for this visit:    1. Mixed hyperlipidemia-retest  -     LIPID PANEL  -     METABOLIC PANEL, COMPREHENSIVE    2. Essential hypertension controlled  -     HEMOGLOBIN  -     TSH 3RD GENERATION  -     T4, FREE    3. Hypothyroidism due to acquired atrophy of thyroid  -     TSH 3RD GENERATION  -     T4, FREE    4. Spinal stenosis of cervical region-corrected with surgery    5. Shoulder-hand syndrome-he has improved and has more elevation in his left arm. Strength depends on the position of his arm. 6. Family history of diabetes mellitus  -     HEMOGLOBIN A1C WITH EAG            Plan of care:  Discussed diagnoses in detail with patient. Medication risks/benefits/side effects discussed with patient. All of the patient's questions were addressed. The patient understands and agrees with our plan of care. The patient knows to call back if they are unsure of or forget any changes we discussed today or if the symptoms change. The patient received an After-Visit Summary which contains VS, orders, medication list and allergy list. This can be used as a \"mini-medical record\" should they have to seek medical care while out of town.     Patient Care Team:  Jean Padilla MD as PCP - General (Family Practice)  Danni Del Cid MD (Orthopedic Surgery)  Gaby Judge MD as Physician (Gastroenterology)  Jose Curtis RN as Ambulatory Care Navigator  [facility] ([facility])    Follow-up Disposition:  Return in about 6 months (around 2/24/2019).     Future Appointments  Date Time Provider Department Center   2/25/2019 8:00 AM Nadine Chau MD [facility] SCHED       Signed By: Nadine Chau MD     August 24, 2018

## 2018-08-24 NOTE — ASSESSMENT & PLAN NOTE
Stable, based on history, physical exam and review of pertinent labs, studies and medications; meds reconciled; continue current treatment plan. Key Neurological Meds             aspirin (ASPIRIN) 325 mg tablet  (Taking) Take 1 Tab by mouth daily.         Lab Results   Component Value Date/Time    WBC 6.9 02/28/2018 03:55 PM    HGB 12.9 04/23/2018 12:01 PM    HCT 42.0 02/28/2018 03:55 PM    PLATELET 737 53/87/4404 03:55 PM    INR POC 3.2 04/14/2017 08:27 AM    Creatinine 0.78 04/23/2018 12:01 PM    BUN 10 04/23/2018 12:01 PM

## 2018-08-24 NOTE — ASSESSMENT & PLAN NOTE
This condition is managed by Specialist.  Key Oncology Meds     Patient is on no Oncologic meds. Key Pain Meds             aspirin (ASPIRIN) 325 mg tablet  (Taking) Take 1 Tab by mouth daily.         Lab Results   Component Value Date/Time    WBC 6.9 02/28/2018 03:55 PM    HGB 12.9 04/23/2018 12:01 PM    HCT 42.0 02/28/2018 03:55 PM    PLATELET 900 42/11/4049 03:55 PM    Creatinine 0.78 04/23/2018 12:01 PM    BUN 10 04/23/2018 12:01 PM    ALT (SGPT) 16 04/23/2018 12:01 PM    AST (SGOT) 18 04/23/2018 12:01 PM    Albumin 4.2 04/23/2018 12:01 PM

## 2018-08-24 NOTE — MR AVS SNAPSHOT
[address] [place], [place] [address] 
[phone] Patient: Gloria Fossa MRN: [id number] DBJ:[id number]/24/[id number] Visit Information Date & Time Provider Department Dept. Phone Encounter #  
 8/24/2018  8:00 AM Jena Padilla MD [address] [place], [place] [address]kiki Corona [id number] Follow-up Instructions Return in about 6 months (around 2/24/2019). Upcoming Health Maintenance Date Due  
 GLAUCOMA SCREENING Q2Y 3/18/2017 Influenza Age 5 to Adult 8/1/2018 MEDICARE YEARLY EXAM 9/23/2018 Pneumococcal 65+ High/Highest Risk (2 of 2 - PPSV23) 9/1/2020 DTaP/Tdap/Td series (2 - Td) 11/11/2024 COLONOSCOPY 10/4/2026 Allergies as of 8/24/2018  Review Complete On: 8/24/2018 By: Katerin Farrell LPN Severity Noted Reaction Type Reactions Lipitor [Atorvastatin]  04/27/2010    Other (comments) Other reaction(s): Cramps Myalgia Simvastatin  04/27/2010    Other (comments) Other reaction(s): Cramps 
myalgia Current Immunizations  Reviewed on 10/11/2017 Name Date Influenza High Dose Vaccine PF 10/11/2017 Influenza Vaccine 10/23/2013 Influenza Vaccine (Quad) PF 9/21/2016, 10/16/2015 Influenza Vaccine Split 10/26/2012, 10/21/2011, 11/10/2010, 12/3/2009 Pneumococcal Conjugate (PCV-13) 11/30/2016 Pneumococcal Polysaccharide (PPSV-23) 9/1/2015 Tdap 11/11/2014 Not reviewed this visit You Were Diagnosed With   
  
 Codes Comments Mixed hyperlipidemia    -  Primary ICD-10-CM: H32.6 ICD-9-CM: 272.2 Essential hypertension     ICD-10-CM: I10 
ICD-9-CM: 401.9 Hypothyroidism due to acquired atrophy of thyroid     ICD-10-CM: E03.4 ICD-9-CM: 244.8, 246.8 Spinal stenosis of cervical region     ICD-10-CM: M48.02 
ICD-9-CM: 723.0 Shoulder-hand syndrome     ICD-10-CM: M89.09 
ICD-9-CM: 337.9 Family history of diabetes mellitus     ICD-10-CM: Z83.3 ICD-9-CM: V18.0 Vitals BP Pulse Temp Resp Height(growth percentile) Weight(growth percentile) 125/78 (BP 1 Location: Right arm, BP Patient Position: Sitting) (!) 57 97.9 °F (36.6 °C) (Oral) 20 5' 11\" (1.803 m) 203 lb 6.4 oz (92.3 kg) SpO2 BMI Smoking Status 98% 28.37 kg/m2 Former Smoker Vitals History BMI and BSA Data Body Mass Index Body Surface Area  
 28.37 kg/m 2 2.15 m 2 Preferred Pharmacy Pharmacy Name Phone [address] [facility] [place] [address] [address]  [phone] Your Updated Medication List  
  
   
This list is accurate as of 8/24/18  8:22 AM.  Always use your most recent med list. amLODIPine 2.5 mg tablet Commonly known as:  [place]ach TAKE ONE TABLET BY MOUTH EVERY DAY  
  
 aspirin 325 mg tablet Commonly known as:  ASPIRIN Take 1 Tab by mouth daily. avanafil 100 mg tablet Commonly known as:  SDDNSVO Take  by mouth as needed for Other. cetirizine 10 mg tablet Commonly known as:  ZYRTEC Take 1 Tab by mouth daily. fexofenadine 180 mg tablet Commonly known as:  Brandy Naegeli Take 1 Tab by mouth nightly. FISH OIL 1,000 mg Cap Generic drug:  omega-3 fatty acids-vitamin e Take 1 Cap by mouth two (2) times a day. fluticasone 50 mcg/actuation nasal spray Commonly known as:  FLONASE  
USE 2 SPRAYS IN EACH NOSTRIL DAILY * magnesium oxide 400 mg tablet Commonly known as:  MAG-OX Take 400 mg by mouth daily. * magnesium oxide 400 mg tablet Commonly known as:  MAG-OXIDE Take 1 Tab by mouth daily. pravastatin 40 mg tablet Commonly known as:  PRAVACHOL  
TAKE ONE TABLET BY MOUTH AT NIGHT ON MONDAY, Former[facility] & FRIDAY Indications: hyperlipidemia Senna 8.6 mg tablet Generic drug:  senna Take 1 Tab by mouth daily. SYNTHROID 150 mcg tablet Generic drug:  levothyroxine TAKE 1 TABLET BY MOUTH DAILY BEFORE BREAKFAST VITAMIN B-12 1,000 mcg tablet Generic drug:  cyanocobalamin Take 1,000 mcg by mouth daily. * Notice: This list has 2 medication(s) that are the same as other medications prescribed for you. Read the directions carefully, and ask your doctor or other care provider to review them with you. We Performed the Following HEMOGLOBIN A1C WITH EAG [21055 CPT(R)] HEMOGLOBIN [id number] CPT(R)] LIPID PANEL [01733 CPT(R)] METABOLIC PANEL, COMPREHENSIVE [14524 CPT(R)] T4, FREE [id number] CPT(R)] TSH 3RD GENERATION [89570 CPT(R)] Follow-up Instructions Return in about 6 months (around 2/24/2019). Patient Instructions High Cholesterol: Care Instructions Your Care Instructions Cholesterol is a type of fat in your blood. It is needed for many body functions, such as making new cells. Cholesterol is made by your body. It also comes from food you eat. High cholesterol means that you have too much of the fat in your blood. This raises your risk of a heart attack and stroke. LDL and HDL are part of your total cholesterol. LDL is the \"bad\" cholesterol. High LDL can raise your risk for heart disease, heart attack, and stroke. HDL is the \"good\" cholesterol. It helps clear bad cholesterol from the body. High HDL is linked with a lower risk of heart disease, heart attack, and stroke. Your cholesterol levels help your doctor find out your risk for having a heart attack or stroke. You and your doctor can talk about whether you need to lower your risk and what treatment is best for you. A heart-healthy lifestyle along with medicines can help lower your cholesterol and your risk. The way you choose to lower your risk will depend on how high your risk is for heart attack and stroke. It will also depend on how you feel about taking medicines. Follow-up care is a key part of your treatment and safety.  Be sure to make and go to all appointments, and call your doctor if you are having problems. It's also a good idea to know your test results and keep a list of the medicines you take. How can you care for yourself at home? · Eat a variety of foods every day. Good choices include fruits, vegetables, whole grains (like oatmeal), dried beans and peas, nuts and seeds, soy products (like tofu), and fat-free or low-fat dairy products. · Replace butter, margarine, and hydrogenated or partially hydrogenated oils with olive and canola oils. (Canola oil margarine without trans fat is fine.) · Replace red meat with fish, poultry, and soy protein (like tofu). · Limit processed and packaged foods like chips, crackers, and cookies. · Bake, broil, or steam foods. Don't shaffer them. · Be physically active. Get at least 30 minutes of exercise on most days of the week. Walking is a good choice. You also may want to do other activities, such as running, swimming, cycling, or playing tennis or team sports. · Stay at a healthy weight or lose weight by making the changes in eating and physical activity listed above. Losing just a small amount of weight, even 5 to 10 pounds, can reduce your risk for having a heart attack or stroke. · Do not smoke. When should you call for help? Watch closely for changes in your health, and be sure to contact your doctor if: 
  · You need help making lifestyle changes.  
  · You have questions about your medicine. Where can you learn more? Go to http://rosa-becky.info/. Enter A897 in the search box to learn more about \"High Cholesterol: Care Instructions. \" Current as of: May 10, 2017 Content Version: 11.7 © 7400-9915 [employer]. Care instructions adapted under license by [employer] (which disclaims liability or warranty for this information).  If you have questions about a medical condition or this instruction, always ask your healthcare professional. [place] [address] any warranty or liability for your use of this information. Introducing [facility] & HE[facility]! [address] introduces [employer] patient portal. Now you can access parts of your medical record, email your doctor's office, and request medication refills online. 1. In your internet browser, go to [email or web address] 2. Click on the First Time User? Click Here link in the Sign In box. You will see the New Member Sign Up page. 3. Enter your [employer] Access Code exactly as it appears below. You will not need to use this code after youve completed the sign-up process. If you do not sign up before the expiration date, you must request a new code. · [employer] Access Code: [email or web address] Expires: 10/8/2018  9:36 AM 
 
4. Enter the last four digits of your Social Security Number (xxxx) and Date of Birth (mm/dd/yyyy) as indicated and click Submit. You will be taken to the next sign-up page. 5. Create a [employer] ID. This will be your [employer] login ID and cannot be changed, so think of one that is secure and easy to remember. 6. Create a [employer] password. You can change your password at any time. 7. Enter your Password Reset Question and Answer. This can be used at a later time if you forget your password. 8. Enter your e-mail address. You will receive e-mail notification when new information is available in [address] 9. Click Sign Up. You can now view and download portions of your medical record. 10. Click the Download Summary menu link to download a portable copy of your medical information. If you have questions, please visit the Frequently Asked Questions section of the [employer] website. Remember, [employer] is NOT to be used for urgent needs. For medical emergencies, dial 911. Now available from your iPhone and Android! Please provide this summary of care documentation to your next provider. Your primary care clinician is listed as [address]. If you have any questions after today's visit, please call [phone].

## 2018-08-25 LAB
ALBUMIN SERPL-MCNC: 4.6 G/DL (ref 3.6–4.8)
ALBUMIN/GLOB SERPL: 1.4 {RATIO} (ref 1.2–2.2)
ALP SERPL-CCNC: 81 IU/L (ref 39–117)
ALT SERPL-CCNC: 19 IU/L (ref 0–44)
AST SERPL-CCNC: 26 IU/L (ref 0–40)
BILIRUB SERPL-MCNC: 0.6 MG/DL (ref 0–1.2)
BUN SERPL-MCNC: 11 MG/DL (ref 8–27)
BUN/CREAT SERPL: 15 (ref 10–24)
CALCIUM SERPL-MCNC: 10.1 MG/DL (ref 8.6–10.2)
CHLORIDE SERPL-SCNC: 105 MMOL/L (ref 96–106)
CHOLEST SERPL-MCNC: 185 MG/DL (ref 100–199)
CO2 SERPL-SCNC: 26 MMOL/L (ref 20–29)
CREAT SERPL-MCNC: 0.75 MG/DL (ref 0.76–1.27)
EST. AVERAGE GLUCOSE BLD GHB EST-MCNC: 94 MG/DL
GLOBULIN SER CALC-MCNC: 3.2 G/DL (ref 1.5–4.5)
GLUCOSE SERPL-MCNC: 96 MG/DL (ref 65–99)
HBA1C MFR BLD: 4.9 % (ref 4.8–5.6)
HDLC SERPL-MCNC: 53 MG/DL
HGB BLD-MCNC: 12.9 G/DL (ref 13–17.7)
LDLC SERPL CALC-MCNC: 119 MG/DL (ref 0–99)
POTASSIUM SERPL-SCNC: 4.7 MMOL/L (ref 3.5–5.2)
PROT SERPL-MCNC: 7.8 G/DL (ref 6–8.5)
SODIUM SERPL-SCNC: 140 MMOL/L (ref 134–144)
T4 FREE SERPL-MCNC: 1.27 NG/DL (ref 0.82–1.77)
TRIGL SERPL-MCNC: 66 MG/DL (ref 0–149)
TSH SERPL DL<=0.005 MIU/L-ACNC: 0.94 UIU/ML (ref 0.45–4.5)
VLDLC SERPL CALC-MCNC: 13 MG/DL (ref 5–40)

## 2018-08-28 ENCOUNTER — TELEPHONE (OUTPATIENT)
Dept: FAMILY MEDICINE CLINIC | Age: 68
End: 2018-08-28

## 2018-08-28 LAB
IRON SATN MFR SERPL: 29 % (ref 15–55)
IRON SERPL-MCNC: 67 UG/DL (ref 38–169)
SPECIMEN STATUS REPORT, ROLRST: NORMAL
TIBC SERPL-MCNC: 231 UG/DL (ref 250–450)
UIBC SERPL-MCNC: 164 UG/DL (ref 111–343)

## 2018-08-28 RX ORDER — ROSUVASTATIN CALCIUM 10 MG/1
TABLET, COATED ORAL
Qty: 36 TAB | Refills: 1 | Status: SHIPPED | OUTPATIENT
Start: 2018-08-28 | End: 2018-11-16 | Stop reason: SDUPTHER

## 2018-08-28 NOTE — TELEPHONE ENCOUNTER
Spoke with patient and informed him per Dr. Devonte Vargas: \"Dear Rhina Garsia:    Please find your most recent results below. With exception of a high cholesterol your labs are acceptable. I would like for you to try a low dose of Crestor which is a stronger cholesterol medicine. If you can tolerate this I think your cholesterol will be better controlled. Please call and give me permission to try this medication. \"      Patient is okay with trying Crestor. Refill request set up and sent to provider for approval. Advised patient to stop taking Pravastatin. Patient verbalized understanding.

## 2018-09-16 ENCOUNTER — HOSPITAL ENCOUNTER (EMERGENCY)
Age: 68
Discharge: HOME | End: 2018-09-16
Attending: EMERGENCY MEDICINE
Payer: MEDICARE

## 2018-09-16 ENCOUNTER — APPOINTMENT (OUTPATIENT)
Dept: GENERAL RADIOLOGY | Age: 68
End: 2018-09-16
Attending: EMERGENCY MEDICINE
Payer: MEDICARE

## 2018-09-16 VITALS
SYSTOLIC BLOOD PRESSURE: 130 MMHG | WEIGHT: 207 LBS | HEART RATE: 106 BPM | HEIGHT: 71 IN | RESPIRATION RATE: 17 BRPM | DIASTOLIC BLOOD PRESSURE: 81 MMHG | TEMPERATURE: 98.5 F | BODY MASS INDEX: 28.98 KG/M2 | OXYGEN SATURATION: 92 %

## 2018-09-16 DIAGNOSIS — J20.9 ACUTE BRONCHITIS, UNSPECIFIED ORGANISM: Primary | ICD-10-CM

## 2018-09-16 LAB
ALBUMIN SERPL-MCNC: 3.8 G/DL (ref 3.5–5)
ALBUMIN/GLOB SERPL: 0.9 {RATIO} (ref 1.1–2.2)
ALP SERPL-CCNC: 84 U/L (ref 45–117)
ALT SERPL-CCNC: 27 U/L (ref 12–78)
ANION GAP SERPL CALC-SCNC: 10 MMOL/L (ref 5–15)
APTT PPP: 24.3 SEC (ref 22.1–32)
AST SERPL-CCNC: 26 U/L (ref 15–37)
ATRIAL RATE: 100 BPM
BASOPHILS # BLD: 0.1 K/UL (ref 0–0.1)
BASOPHILS NFR BLD: 1 % (ref 0–1)
BILIRUB SERPL-MCNC: 0.9 MG/DL (ref 0.2–1)
BNP SERPL-MCNC: 7 PG/ML (ref 0–125)
BUN SERPL-MCNC: 15 MG/DL (ref 6–20)
BUN/CREAT SERPL: 17 (ref 12–20)
CALCIUM SERPL-MCNC: 9.6 MG/DL (ref 8.5–10.1)
CALCULATED P AXIS, ECG09: 24 DEGREES
CALCULATED R AXIS, ECG10: -44 DEGREES
CALCULATED T AXIS, ECG11: 56 DEGREES
CHLORIDE SERPL-SCNC: 106 MMOL/L (ref 97–108)
CK SERPL-CCNC: 196 U/L (ref 39–308)
CO2 SERPL-SCNC: 25 MMOL/L (ref 21–32)
COMMENT, HOLDF: NORMAL
CREAT SERPL-MCNC: 0.86 MG/DL (ref 0.7–1.3)
DIAGNOSIS, 93000: NORMAL
DIFFERENTIAL METHOD BLD: NORMAL
EOSINOPHIL # BLD: 0.2 K/UL (ref 0–0.4)
EOSINOPHIL NFR BLD: 1 % (ref 0–7)
ERYTHROCYTE [DISTWIDTH] IN BLOOD BY AUTOMATED COUNT: 13.2 % (ref 11.5–14.5)
GLOBULIN SER CALC-MCNC: 4.3 G/DL (ref 2–4)
GLUCOSE SERPL-MCNC: 122 MG/DL (ref 65–100)
HCT VFR BLD AUTO: 39.7 % (ref 36.6–50.3)
HGB BLD-MCNC: 13 G/DL (ref 12.1–17)
IMM GRANULOCYTES # BLD: 0 K/UL (ref 0–0.04)
IMM GRANULOCYTES NFR BLD AUTO: 0 % (ref 0–0.5)
INR PPP: 1.1 (ref 0.9–1.1)
LYMPHOCYTES # BLD: 2.2 K/UL (ref 0.8–3.5)
LYMPHOCYTES NFR BLD: 21 % (ref 12–49)
MCH RBC QN AUTO: 30 PG (ref 26–34)
MCHC RBC AUTO-ENTMCNC: 32.7 G/DL (ref 30–36.5)
MCV RBC AUTO: 91.7 FL (ref 80–99)
MONOCYTES # BLD: 0.7 K/UL (ref 0–1)
MONOCYTES NFR BLD: 6 % (ref 5–13)
NEUTS SEG # BLD: 7.4 K/UL (ref 1.8–8)
NEUTS SEG NFR BLD: 70 % (ref 32–75)
NRBC # BLD: 0 K/UL (ref 0–0.01)
NRBC BLD-RTO: 0 PER 100 WBC
P-R INTERVAL, ECG05: 160 MS
PLATELET # BLD AUTO: 234 K/UL (ref 150–400)
PMV BLD AUTO: 10.7 FL (ref 8.9–12.9)
POTASSIUM SERPL-SCNC: 4 MMOL/L (ref 3.5–5.1)
PROT SERPL-MCNC: 8.1 G/DL (ref 6.4–8.2)
PROTHROMBIN TIME: 10.8 SEC (ref 9–11.1)
Q-T INTERVAL, ECG07: 350 MS
QRS DURATION, ECG06: 100 MS
QTC CALCULATION (BEZET), ECG08: 451 MS
RBC # BLD AUTO: 4.33 M/UL (ref 4.1–5.7)
SAMPLES BEING HELD,HOLD: NORMAL
SODIUM SERPL-SCNC: 141 MMOL/L (ref 136–145)
THERAPEUTIC RANGE,PTTT: NORMAL SECS (ref 58–77)
TROPONIN I SERPL-MCNC: <0.05 NG/ML
VENTRICULAR RATE, ECG03: 100 BPM
WBC # BLD AUTO: 10.6 K/UL (ref 4.1–11.1)

## 2018-09-16 PROCEDURE — 83880 ASSAY OF NATRIURETIC PEPTIDE: CPT | Performed by: EMERGENCY MEDICINE

## 2018-09-16 PROCEDURE — 85730 THROMBOPLASTIN TIME PARTIAL: CPT | Performed by: EMERGENCY MEDICINE

## 2018-09-16 PROCEDURE — 99285 EMERGENCY DEPT VISIT HI MDM: CPT

## 2018-09-16 PROCEDURE — 85025 COMPLETE CBC W/AUTO DIFF WBC: CPT | Performed by: EMERGENCY MEDICINE

## 2018-09-16 PROCEDURE — 36415 COLL VENOUS BLD VENIPUNCTURE: CPT | Performed by: EMERGENCY MEDICINE

## 2018-09-16 PROCEDURE — 84484 ASSAY OF TROPONIN QUANT: CPT | Performed by: EMERGENCY MEDICINE

## 2018-09-16 PROCEDURE — 77030013140 HC MSK NEB VYRM -A

## 2018-09-16 PROCEDURE — 74011000250 HC RX REV CODE- 250: Performed by: EMERGENCY MEDICINE

## 2018-09-16 PROCEDURE — 96374 THER/PROPH/DIAG INJ IV PUSH: CPT

## 2018-09-16 PROCEDURE — 93005 ELECTROCARDIOGRAM TRACING: CPT

## 2018-09-16 PROCEDURE — 80053 COMPREHEN METABOLIC PANEL: CPT | Performed by: EMERGENCY MEDICINE

## 2018-09-16 PROCEDURE — 82550 ASSAY OF CK (CPK): CPT | Performed by: EMERGENCY MEDICINE

## 2018-09-16 PROCEDURE — 85610 PROTHROMBIN TIME: CPT | Performed by: EMERGENCY MEDICINE

## 2018-09-16 PROCEDURE — 94640 AIRWAY INHALATION TREATMENT: CPT

## 2018-09-16 PROCEDURE — 71045 X-RAY EXAM CHEST 1 VIEW: CPT

## 2018-09-16 PROCEDURE — 74011250636 HC RX REV CODE- 250/636: Performed by: EMERGENCY MEDICINE

## 2018-09-16 RX ORDER — AZITHROMYCIN 250 MG/1
TABLET, FILM COATED ORAL
Qty: 6 TAB | Refills: 0 | Status: SHIPPED | OUTPATIENT
Start: 2018-09-16 | End: 2019-02-25 | Stop reason: ALTCHOICE

## 2018-09-16 RX ORDER — PREDNISONE 10 MG/1
TABLET ORAL
Qty: 1 PACKAGE | Refills: 0 | Status: SHIPPED | OUTPATIENT
Start: 2018-09-16 | End: 2019-02-25 | Stop reason: ALTCHOICE

## 2018-09-16 RX ORDER — ALBUTEROL SULFATE 90 UG/1
1 AEROSOL, METERED RESPIRATORY (INHALATION)
Qty: 1 INHALER | Refills: 0 | Status: SHIPPED | OUTPATIENT
Start: 2018-09-16 | End: 2018-12-06 | Stop reason: SDUPTHER

## 2018-09-16 RX ADMIN — ALBUTEROL SULFATE 1 DOSE: 2.5 SOLUTION RESPIRATORY (INHALATION) at 06:52

## 2018-09-16 RX ADMIN — METHYLPREDNISOLONE SODIUM SUCCINATE 125 MG: 125 INJECTION, POWDER, FOR SOLUTION INTRAMUSCULAR; INTRAVENOUS at 08:28

## 2018-09-16 RX ADMIN — ALBUTEROL SULFATE 1 DOSE: 2.5 SOLUTION RESPIRATORY (INHALATION) at 07:51

## 2018-09-16 NOTE — LETTER
[address] [place] [address] Dr 
[facility] EMERGENCY DEPT 
[address] [facility] [place] [address] [phone] [phone] Work/School Note Date: 9/16/2018 To Whom It May concern: 
 
Tyler Mackay was seen and treated today in the emergency room by the following provider(s): 
Attending Provider: Elvia Simms MD.   
 
Tyler Mackay may return to work on 09/20/2018. Sincerely, Elvia Simms MD

## 2018-09-16 NOTE — ED TRIAGE NOTES
Patient arrives with complaints of cough, shortness of breath and wheezing since last night. Patient used inhaler this morning with no relief and took tylenol cold and flu last night

## 2018-09-16 NOTE — ED NOTES
Patient discharged by provider. D/C instructions given. Pt educated to take their medications as instructed for management at home. Pt verbalized understanding, verbalized no questions. PIV removed, pressure dressing applied. Pt ambulated out of ER without difficulty, NAD. Pt declined using a w/c. Home with family. Patient Vitals for the past 4 hrs: 
 Temp Pulse Resp BP SpO2  
09/16/18 0905 - (!) 106 17 130/81 92 % 09/16/18 0753 - - - - 96 % 09/16/18 0721 98.5 °F (36.9 °C) 91 17 138/82 93 % 09/16/18 0718 - 100 20 138/82 91 % 09/16/18 0643 98.9 °F (37.2 °C) 92 20 139/84 93 %

## 2018-09-16 NOTE — ED NOTES
Assumed care of pt. Bed locked and in low position with call bell within reach. Using AIDET-Introduced self as Primary RN and plan discussed with pt with understanding was verbalized. Pt denies additional complaints at this time. White board updated with this nurse's name. Patient advised that medical information will be discussed and it is their own responsibility to tell nurse if such conversation should not take place in the presence of visitors. Pt verbalizes understanding. Pt's family member at bedside. On monitor x 3 with NAD. Will continue to monitor.

## 2018-09-16 NOTE — LETTER
[address] 
[facility] EMERGENCY DEPT 
[address] [facility] [id number] [phone] Work/School Note Date: 9/16/2018 To Whom It May concern: 
 
Luciana Gambino was seen and treated today in the emergency room by the following provider(s): 
Attending Provider: Claire Candelario MD.   
 
Luciana Gambino Special Instructions:  Medically cleared to drive on 26/38/7603. Sincerely, Claire Candelario MD

## 2018-09-16 NOTE — ED PROVIDER NOTES
HPI Comments: 79 y.o. male with past medical history significant for hyperlipidemia, thyroid cancer, prostate cancer, HTN, and hypercholesterolemia who presents with chief complaint of shortness of breath. Pt complains of shortness of breath that started when he woke up this morning with an associated productive cough of clear sputum. Pt states he has a hx of allergies but denies hx of COPD. There are no other acute medical concerns at this time. Social hx: Former Smoker. Moderate EtOH Use. PCP: Cyndi Matthews MD 
 
Note written by Jamar Hastings, as dictated by Abhinav Coronado MD 7:11 AM 
 
 
Patient is a 79 y.o. male presenting with shortness of breath and cough. The history is provided by the patient and the spouse. Shortness of Breath This is a recurrent problem. Associated symptoms include cough and wheezing. Pertinent negatives include no fever and no vomiting. Cough Associated symptoms include shortness of breath and wheezing. Pertinent negatives include no nausea and no vomiting. Past Medical History:  
Diagnosis Date  Benign neoplasm of colon 4/27/2010  Cancer ([address] PROSTATE  
 HTN (hypertension) 11/11/2014  Hypercholesterolemia Bonifacio Ramiro Internal hemorrhoid 4/27/2010  Keloid 4/27/2010  Mixed hyperlipidemia 4/27/2010  Neck mass 4/27/2010  Other ill-defined conditions(799.89) HI CHOLESTEROL  Prostate cancer ([address] 4/17/2011  Smoker  Stroke ([facility] [address] Per pt 2014  Thyroid disease HYPO  
 Tobacco use disorder 4/27/2010 Past Surgical History:  
Procedure Laterality Date  HX GI    
 COLONOSCOPY  
 HX OTHER SURGICAL KELOIDS--BACK  HX PROSTATECTOMY    
 PROSTATE BIOPSY [address]  
 adhesions----1997 Family History:  
Problem Relation Age of Onset  Diabetes Father  Asthma Sister  Alcohol abuse Neg Hx  Arthritis-rheumatoid Neg Hx  Bleeding Prob Neg Hx  Cancer Neg Hx  Elevated Lipids Neg Hx   
 Headache Neg Hx   
 Heart Disease Neg Hx  Hypertension Neg Hx  Lung Disease Neg Hx  Migraines Neg Hx  Psychiatric Disorder Neg Hx  Stroke Neg Hx  Mental Retardation Neg Hx Social History Social History  Marital status:  Spouse name: N/A  
 Number of children: N/A  
 Years of education: N/A Occupational History  Not on file. Social History Main Topics  Smoking status: Former Smoker Packs/day: 0.25 Years: 40.00 Types: Cigarettes Quit date: 2/9/2015  Smokeless tobacco: Never Used Comment: smokes 1/2 pack week  Alcohol use 3.0 oz/week 6 Cans of beer per week Comment: moderately  Drug use: No  
 Sexual activity: Yes Other Topics Concern  Not on file Social History Narrative ALLERGIES: Lipitor [atorvastatin] and Simvastatin Review of Systems Constitutional: Negative for fever. Respiratory: Positive for cough, shortness of breath and wheezing. Gastrointestinal: Negative for diarrhea, nausea and vomiting. All other systems reviewed and are negative. Vitals:  
 09/16/18 5263 BP: 139/84 Pulse: 92 Resp: 20 Temp: 98.9 °F (37.2 °C) SpO2: 93% Weight: 93.9 kg (207 lb) Height: 5' 11\" (1.803 m) Physical Exam  
Constitutional: He is oriented to person, place, and time. He appears well-developed and well-nourished. No distress. HENT:  
Head: Normocephalic and atraumatic. Eyes: Conjunctivae are normal. No scleral icterus. Neck: Neck supple. No tracheal deviation present. Cardiovascular: Normal rate, regular rhythm, normal heart sounds and intact distal pulses. Exam reveals no gallop and no friction rub. No murmur heard. Pulmonary/Chest: Effort normal. He has wheezes. He has no rales. Bilateral wheezes. Moving air well. No accessory muscle use. Abdominal: Soft. He exhibits no distension. There is no tenderness.  There is no rebound and no guarding. Musculoskeletal: He exhibits no edema. Neurological: He is alert and oriented to person, place, and time. Skin: Skin is warm and dry. No rash noted. Psychiatric: He has a normal mood and affect. Nursing note and vitals reviewed. Note written by Jamar Simmons, as dictated by Claire Candelario MD 7:13 AM 
 
 
[facility] 
 
 
ED Course Procedures ED EKG interpretation: 
Rhythm: normal sinus rhythm with fusion complexes; and regular . Rate (approx.): 100; Axis: left axis deviation; ST/T wave: normal;  
Note written by Jamar Simmons, as dictated by Claire Candelario MD 7:37 AM 
 
PROGRESS NOTE: 
8:47 AM 
Pt has acute bronchitis with wheezing component. Will discharge home on antibiotics and with an inhaler.

## 2018-09-17 ENCOUNTER — OFFICE VISIT (OUTPATIENT)
Dept: FAMILY MEDICINE CLINIC | Age: 68
End: 2018-09-17

## 2018-09-17 VITALS
DIASTOLIC BLOOD PRESSURE: 77 MMHG | RESPIRATION RATE: 18 BRPM | OXYGEN SATURATION: 97 % | WEIGHT: 208.6 LBS | HEIGHT: 71 IN | TEMPERATURE: 98.5 F | HEART RATE: 69 BPM | SYSTOLIC BLOOD PRESSURE: 128 MMHG | BODY MASS INDEX: 29.2 KG/M2

## 2018-09-17 DIAGNOSIS — I10 ESSENTIAL HYPERTENSION: Chronic | ICD-10-CM

## 2018-09-17 DIAGNOSIS — Z00.00 MEDICARE ANNUAL WELLNESS VISIT, SUBSEQUENT: Primary | ICD-10-CM

## 2018-09-17 DIAGNOSIS — E03.4 HYPOTHYROIDISM DUE TO ACQUIRED ATROPHY OF THYROID: Chronic | ICD-10-CM

## 2018-09-17 NOTE — MR AVS SNAPSHOT
[facility] 
 
 
 [address] [address] [phone] Patient: Toyin Gilliam MRN: [id number] UWA:[id number] Visit Information Date & Time Provider Department Dept. Phone Encounter #  
 9/17/2018  1:25 PM Richie Ramirez MD 7 Ana Schmitt [id number] Follow-up Instructions Return in about 2 weeks (around 10/1/2018) for flushot. Your Appointments 2/25/2019  8:00 AM  
ROUTINE CARE with Richie Ramirez MD  
[facility] Appt Note: 6 mo f/u-Mixed Hyperlipidemia [address] [address]  
[place] [address] [place] [address] [address] [address] Upcoming Health Maintenance Date Due Influenza Age 5 to Adult 8/1/2018 MEDICARE YEARLY EXAM 9/23/2018 GLAUCOMA SCREENING Q2Y 4/24/2020 Pneumococcal 65+ High/Highest Risk (2 of 2 - PPSV23) 9/1/2020 DTaP/Tdap/Td series (2 - Td) 11/11/2024 COLONOSCOPY 10/4/2026 Allergies as of 9/17/2018  Review Complete On: 9/17/2018 By: Richie Ramirez MD  
  
 Severity Noted Reaction Type Reactions Lipitor [Atorvastatin]  04/27/2010    Other (comments) Other reaction(s): Cramps Myalgia Simvastatin  04/27/2010    Other (comments) Other reaction(s): Cramps 
myalgia Current Immunizations  Reviewed on 10/11/2017 Name Date Influenza High Dose Vaccine PF 10/11/2017 Influenza Vaccine 10/23/2013 Influenza Vaccine (Quad) PF 9/21/2016, 10/16/2015 Influenza Vaccine Split 10/26/2012, 10/21/2011, 11/10/2010, 12/3/2009 Pneumococcal Conjugate (PCV-13) 11/30/2016 Pneumococcal Polysaccharide (PPSV-23) 9/1/2015 Tdap 11/11/2014 Not reviewed this visit You Were Diagnosed With   
  
 Codes Comments Medicare annual wellness visit, subsequent    -  Primary ICD-10-CM: Z00.00 ICD-9-CM: V70.0 Hypothyroidism due to acquired atrophy of thyroid     ICD-10-CM: E03.4 ICD-9-CM: 244.8, 246.8 Essential hypertension     ICD-10-CM: I10 
ICD-9-CM: 401.9 Vitals BP Pulse Temp Resp Height(growth percentile) Weight(growth percentile) 128/77 (BP 1 Location: Left arm, BP Patient Position: Sitting) 69 98.5 °F (36.9 °C) (Oral) 18 5' 11\" (1.803 m) 208 lb 9.6 oz (94.6 kg) SpO2 BMI Smoking Status 97% 29.09 kg/m2 Former Smoker Vitals History BMI and BSA Data Body Mass Index Body Surface Area  
 29.09 kg/m 2 2.18 m 2 Preferred Pharmacy Pharmacy Name Phone [address], [place] - [address] [phone] Your Updated Medication List  
  
   
This list is accurate as of 9/17/18  1:39 PM.  Always use your most recent med list.  
  
  
  
  
 albuterol 90 mcg/actuation inhaler Commonly known as:  PROVENTIL HFA, VENTOLIN HFA, PROAIR HFA Take 1 Puff by inhalation every four (4) hours as needed for Wheezing. amLODIPine 2.5 mg tablet Commonly known as:  Arlyss Salix TAKE ONE TABLET BY MOUTH EVERY DAY  
  
 aspirin 325 mg tablet Commonly known as:  ASPIRIN Take 1 Tab by mouth daily. avanafil 100 mg tablet Commonly known as:  PMODTWH Take  by mouth as needed for Other. azithromycin 250 mg tablet Commonly known as:  ZITHROMAX  
z pack  
  
 cetirizine 10 mg tablet Commonly known as:  ZYRTEC Take 1 Tab by mouth daily. fexofenadine 180 mg tablet Commonly known as:  Justin Jonas Take 1 Tab by mouth nightly. FISH OIL 1,000 mg Cap Generic drug:  omega-3 fatty acids-vitamin e Take 1 Cap by mouth two (2) times a day. fluticasone 50 mcg/actuation nasal spray Commonly known as:  FLONASE  
USE 2 SPRAYS IN EACH NOSTRIL DAILY * magnesium oxide 400 mg tablet Commonly known as:  MAG-OX Take 400 mg by mouth daily. * magnesium oxide 400 mg tablet Commonly known as:  MAG-OXIDE  
 Take 1 Tab by mouth daily. predniSONE 10 mg dose pack Commonly known as:  STERAPRED DS  
6 days  
  
 rosuvastatin 10 mg tablet Commonly known as:  CRESTOR  
TAKE ONE TABLET BY MOUTH AT NIGHT ON MONDAY, [address] Indications: hyperlipidemia. Stop taking the Pravastatin Senna 8.6 mg tablet Generic drug:  senna Take 1 Tab by mouth daily. SYNTHROID 150 mcg tablet Generic drug:  levothyroxine TAKE 1 TABLET BY MOUTH DAILY BEFORE BREAKFAST  
  
 TYLENOL COLD MULTI-SYMPTOM DAY 5- mg/15 mL Liqd Generic drug:  Phenylephrine-DM-Acetaminophen Take  by mouth. Indications: COLD SYMPTOMS  
  
 VITAMIN B-12 1,000 mcg tablet Generic drug:  cyanocobalamin Take 1,000 mcg by mouth daily. * Notice: This list has 2 medication(s) that are the same as other medications prescribed for you. Read the directions carefully, and ask your doctor or other care provider to review them with you. Follow-up Instructions Return in about 2 weeks (around 10/1/2018) for flushot. Patient Instructions Well Visit, Over 72: Care Instructions Your Care Instructions Physical exams can help you stay healthy. Your doctor has checked your overall health and may have suggested ways to take good care of yourself. He or she also may have recommended tests. At home, you can help prevent illness with healthy eating, regular exercise, and other steps. Follow-up care is a key part of your treatment and safety. Be sure to make and go to all appointments, and call your doctor if you are having problems. It's also a good idea to know your test results and keep a list of the medicines you take. How can you care for yourself at home? · Reach and stay at a healthy weight. This will lower your risk for many problems, such as obesity, diabetes, heart disease, and high blood pressure. · Get at least 30 minutes of exercise on most days of the week.  Walking is a good choice. You also may want to do other activities, such as running, swimming, cycling, or playing tennis or team sports. · Do not smoke. Smoking can make health problems worse. If you need help quitting, talk to your doctor about stop-smoking programs and medicines. These can increase your chances of quitting for good. · Protect your skin from too much sun. When you're outdoors from 10 a.m. to 4 p.m., stay in the shade or cover up with clothing and a hat with a wide brim. Wear sunglasses that block UV rays. Even when it's cloudy, put broad-spectrum sunscreen (SPF 30 or higher) on any exposed skin. · See a dentist one or two times a year for checkups and to have your teeth cleaned. · Wear a seat belt in the car. · Limit alcohol to 2 drinks a day for men and 1 drink a day for women. Too much alcohol can cause health problems. Follow your doctor's advice about when to have certain tests. These tests can spot problems early. For men and women · Cholesterol. Your doctor will tell you how often to have this done based on your overall health and other things that can increase your risk for heart attack and stroke. · Blood pressure. Have your blood pressure checked during a routine doctor visit. Your doctor will tell you how often to check your blood pressure based on your age, your blood pressure results, and other factors. · Diabetes. Ask your doctor whether you should have tests for diabetes. · Vision. Experts recommend that you have yearly exams for glaucoma and other age-related eye problems. · Hearing. Tell your doctor if you notice any change in your hearing. You can have tests to find out how well you hear. · Colon cancer tests. Keep having colon cancer tests as your doctor recommends. You can have one of several types of tests. · Heart attack and stroke risk. At least every 4 to 6 years, you should have your risk for heart attack and stroke assessed.  Your doctor uses factors such as your age, blood pressure, cholesterol, and whether you smoke or have diabetes to show what your risk for a heart attack or stroke is over the next 10 years. · Osteoporosis. Talk to your doctor about whether you should have a bone density test to find out whether you have thinning bones. Also ask your doctor about whether you should take calcium and vitamin D supplements. For women · Pap test and pelvic exam. You may no longer need a Pap test. Talk with your doctor about whether to stop or continue to have Pap tests. · Breast exam and mammogram. Ask how often you should have a mammogram, which is an X-ray of your breasts. A mammogram can spot breast cancer before it can be felt and when it is easiest to treat. · Thyroid disease. Talk to your doctor about whether to have your thyroid checked as part of a regular physical exam. Women have an increased chance of a thyroid problem. For men · Prostate exam. Talk to your doctor about whether you should have a blood test (called a PSA test) for prostate cancer. Experts disagree on whether men should have this test. Some experts recommend that you discuss the benefits and risks of the test with your doctor. · Abdominal aortic aneurysm. Ask your doctor whether you should have a test to check for an aneurysm. You may need a test if you ever smoked or if your parent, brother, sister, or child has had an aneurysm. When should you call for help? Watch closely for changes in your health, and be sure to contact your doctor if you have any problems or symptoms that concern you. Where can you learn more? Go to http://rosa-becky.info/. Enter N057 in the search box to learn more about \"Well Visit, Over 65: Care Instructions. \" Current as of: May 16, 2017 Content Version: 11.7 © 2154-7419 [employer].  Care instructions adapted under license by [employer] (which disclaims liability or warranty for this information). If you have questions about a medical condition or this instruction, always ask your healthcare professional. [address] any warranty or liability for your use of this information. Introducing [facility] SERVICES! [employer] introduces [employer] patient portal. Now you can access parts of your medical record, email your doctor's office, and request medication refills online. 1. In your internet browser, go to [email or web address] 2. Click on the First Time User? Click Here link in the Sign In box. You will see the New Member Sign Up page. 3. Enter your [employer] Access Code exactly as it appears below. You will not need to use this code after youve completed the sign-up process. If you do not sign up before the expiration date, you must request a new code. · [employer] Access Code: [email or web address] Expires: 10/8/2018  9:36 AM 
 
4. Enter the last four digits of your Social Security Number (xxxx) and Date of Birth (mm/dd/yyyy) as indicated and click Submit. You will be taken to the next sign-up page. 5. Create a [employer] ID. This will be your [employer] login ID and cannot be changed, so think of one that is secure and easy to remember. 6. Create a [employer] password. You can change your password at any time. 7. Enter your Password Reset Question and Answer. This can be used at a later time if you forget your password. 8. Enter your e-mail address. You will receive e-mail notification when new information is available in [address] 9. Click Sign Up. You can now view and download portions of your medical record. 10. Click the Download Summary menu link to download a portable copy of your medical information. If you have questions, please visit the Frequently Asked Questions section of the [employer] website. Remember, [employer] is NOT to be used for urgent needs. For medical emergencies, dial 911. Now available from your iPhone and Android! Please provide this summary of care documentation to your next provider. Your primary care clinician is listed as [address]. If you have any questions after today's visit, please call [phone].

## 2018-09-17 NOTE — PROGRESS NOTES
[address] [facility]  2003 A [facility], [address] [facility]             Date of visit: 9/17/2018       This is a Subsequent Medicare Annual Wellness Visit (AWV), (Performed more than 12 months after effective date of Medicare Part B enrollment and 12 months after last wellness visit)    I have reviewed the patient's medical history in detail and updated the computerized patient record. Eliceo Olivera is a 79 y.o. male   History obtained from: the patient.     Concerns today   (Patient understands that medical problems addressed today may incur additional notes andcost as this is a preventive visit)      History     Patient Active Problem List   Diagnosis Code    Mixed hyperlipidemia E78.2    Benign neoplasm of colon D12.6    Tobacco use disorder F17.200    Keloid L91.0    Internal hemorrhoid K64.8    Thyroglossal duct cyst Q89.2    Boil L02.92    Hypothyroidism E03.9    Prostate cancer ([address] C61    Hypertension I10    History of CVA (cerebrovascular accident) Z80.78    Spinal stenosis of cervical region M48.02    Hypercholesterolemia E78.00    Pain R52    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)     Per pt 2014    Thyroid disease     HYPO    Tobacco use disorder 4/27/2010      Past Surgical History:   Procedure Laterality Date    HX GI      COLONOSCOPY    HX OTHER SURGICAL      KELOIDS--BACK    HX PROSTATECTOMY      PROSTATE BIOPSY    HX SPLENECTOMY  1969 adhesions----1997     Allergies   Allergen Reactions    Lipitor [Atorvastatin] Other (comments)     Other reaction(s): Cramps  Myalgia      Simvastatin Other (comments)     Other reaction(s): Cramps  myalgia       Current Outpatient Prescriptions   Medication Sig Dispense Refill    Phenylephrine-DM-Acetaminophen (TYLENOL COLD MULTI-SYMPTOM DAY) 5- mg/15 mL liqd Take  by mouth. Indications: COLD SYMPTOMS      albuterol (PROVENTIL HFA, VENTOLIN HFA, PROAIR HFA) 90 mcg/actuation inhaler Take 1 Puff by inhalation every four (4) hours as needed for Wheezing. 1 Inhaler 0    predniSONE (STERAPRED DS) 10 mg dose pack 6 days 1 Package 0    azithromycin (ZITHROMAX) 250 mg tablet z pack 6 Tab 0    rosuvastatin (CRESTOR) 10 mg tablet TAKE ONE TABLET BY MOUTH AT NIGHT ON MONDAY, [facility] & FRIDAY Indications: hyperlipidemia. Stop taking the Pravastatin 36 Tab 1    fexofenadine (ALLEGRA) 180 mg tablet Take 1 Tab by mouth nightly. 30 Tab 2    cetirizine (ZYRTEC) 10 mg tablet Take 1 Tab by mouth daily. 30 Tab 2    SYNTHROID 150 mcg tablet TAKE 1 TABLET BY MOUTH DAILY BEFORE BREAKFAST 30 Tab 6    aspirin (ASPIRIN) 325 mg tablet Take 1 Tab by mouth daily.  amLODIPine (NORVASC) 2.5 mg tablet TAKE ONE TABLET BY MOUTH EVERY DAY 30 Tab 5    senna (SENNA) 8.6 mg tablet Take 1 Tab by mouth daily.  cyanocobalamin (VITAMIN B-12) 1,000 mcg tablet Take 1,000 mcg by mouth daily.  avanafil (STENDRA) 100 mg tablet Take  by mouth as needed for Other.  omega-3 fatty acids-vitamin e (FISH OIL) 1,000 mg Cap Take 1 Cap by mouth two (2) times a day.  fluticasone (FLONASE) 50 mcg/actuation nasal spray USE 2 SPRAYS IN EACH NOSTRIL DAILY 16 g 11    magnesium oxide (MAG-OX) 400 mg tablet Take 400 mg by mouth daily.  [CANCELED] magnesium oxide (MAG-OXIDE) 400 mg tablet Take 1 Tab by mouth daily.  [address] Tab 3     Family History   Problem Relation Age of Onset    Diabetes Father     Asthma Sister     Alcohol abuse Neg Hx     Arthritis-rheumatoid Neg Hx     Bleeding Prob Neg Hx     Cancer Neg Hx     Elevated Lipids Neg Hx     Headache Neg Hx     Heart Disease Neg Hx     Hypertension Neg Hx     Lung Disease Neg Hx     Migraines Neg Hx     Psychiatric Disorder Neg Hx     Stroke Neg Hx     Mental Retardation Neg Hx      Social History   Substance Use Topics    Smoking status: Former Smoker     Packs/day: 0.25     Years: 40.00     Types: Cigarettes     Quit date: 2/9/2015    Smokeless tobacco: Never Used      Comment: smokes 1/2 pack week    Alcohol use 3.0 oz/week     6 Cans of beer per week      Comment: moderately       Specialists/Care Team   Lewis R. Littie Schilder has established care with the following healthcare providers:  Patient Care Team:  Carolina Nguyen MD as PCP - General (Family Practice)  Marylin Harada, MD (Orthopedic Surgery)  Mayo Tabor MD as Physician (Gastroenterology)  Liliana Cardenas RN as Ambulatory Care Navigator  [place], [place] (Optometry)      Health Risk Assessment     Demographics   male  79 y.o. General Health Questions   -During the past 4 weeks:   -how would you rate your health in general? Good   -how often have you been bothered by feeling dizzy when standing up? never   -how much have you been bothered by bodily pain? moderately   -Have you noticed any hearing difficulties? no   -has your physical and emotional health limited your social activities with family or friends? no    Emotional Health Questions   -Do you have a history of depression, anxiety, or emotional problems? no  -Over the past 2 weeks, have you felt down, depressed or hopeless? no  -Over the past 2 weeks, have you felt little interest or pleasure in doing things? no    Health Habits   Please describe your diet habits: C-  Do you get 5 servings of fruits or vegetables daily? no  Do you exercise regularly? no    Alcohol Risk Factor Screening:    On any occasion during the past 3 months, have you had more than 4 drinks containing alcohol? No   Do you average more than 14 drinks per week? No     Activities of Daily Living and Functional Status   -Do you need help with eating, walking, dressing, bathing, toileting, the phone, transportation, shopping, preparing meals, housework, laundry, medications or managing money? no  -In the past four weeks, was someone available to help you if you needed and wanted help with anything? yes  -Are you confident are you that you can control and manage most of your health problems? yes  -Have you been given information to help you keep track of your medications? yes  -How often do you have trouble taking your medications as prescribed? occasionally    Fall Risk and Home Safety   Have you fallen 2 or more times in the past year? no  Does your home have rugs in the hallway, lack grab bars in the bathroom, lack handrails on the stairs or have poor lighting? no  Do you have smoke detectors and check them regularly? yes  Do you have difficulties driving a car? no  Do you always fasten your seat belt when you are in a car? yes    Review of Systems (if indicated for problems addressed today)   Review of Systems   Constitutional: Negative. HENT: Negative. Eyes: Negative. Respiratory: Negative. Cardiovascular: Negative. Gastrointestinal: Negative. Genitourinary: Negative. Musculoskeletal: Positive for back pain. Decreased strength in left arm. Skin: Negative. Endo/Heme/Allergies: Negative. Psychiatric/Behavioral: Negative.           Physical Examination     Wt Readings from Last 3 Encounters:   09/17/18 208 lb 9.6 oz (94.6 kg)   09/16/18 207 lb (93.9 kg)   08/24/18 203 lb 6.4 oz (92.3 kg)     Temp Readings from Last 3 Encounters:   09/17/18 98.5 °F (36.9 °C) (Oral)   09/16/18 98.5 °F (36.9 °C)   08/24/18 97.9 °F (36.6 °C) (Oral)     BP Readings from Last 3 Encounters:   09/17/18 128/77   09/16/18 130/81   08/24/18 125/78     Pulse Readings from Last 3 Encounters:   09/17/18 69   09/16/18 (!) 106   08/24/18 (!) 57       Body mass index is 29.09 kg/(m^2). No exam data present  Was the patient's timed Up & Go test unsteady or longer than 10 seconds? no    Evaluation of Cognitive Function   Mood/affect:  happy  Orientation: Person, Place, Time and Situation  Appearance: age appropriate and casually dressed  Family member/caregiver input: no    Additional exam if indicated for problems addressed today:  Physical Exam   Constitutional: He is oriented to person, place, and time. He appears well-developed and well-nourished. HENT:   Head: Normocephalic and atraumatic. Right Ear: External ear normal.   Left Ear: External ear normal.   Mouth/Throat: Oropharynx is clear and moist.   Eyes: Conjunctivae are normal. Pupils are equal, round, and reactive to light. Neck: No thyromegaly present. No carotid bruit. Cardiovascular: Normal rate, regular rhythm and normal heart sounds. Exam reveals no gallop and no friction rub. No murmur heard. Pulmonary/Chest: Effort normal and breath sounds normal. He has no wheezes. He has no rales. Abdominal: Soft. Bowel sounds are normal. He exhibits no distension. There is no tenderness. There is no rebound and no guarding. Musculoskeletal: He exhibits no edema or tenderness. Lymphadenopathy:     He has no cervical adenopathy. Neurological: He is alert and oriented to person, place, and time. Skin: Skin is warm and dry. No rash noted. He is not diaphoretic. Psychiatric: He has a normal mood and affect. His behavior is normal. Judgment and thought content normal.   Nursing note and vitals reviewed. Advice/Referrals/Counseling (as indicated)   Education and counseling provided for any problems identified above: immunization.     Preventive Services     (Preventive care checklist to be included in patient instructions)  Discussed today Done Previously Not Needed     x  Pneumococcal vaccines   x   Flu vaccine     x Hepatitis B vaccine (if at risk)   x   Shingles vaccine    x  TDAP vaccine     x Digital rectal exam     x PSA     x Colorectal cancer screening     x Low-dose CT for lung cancer screening     x Bone density test    x  Glaucoma screening    x  Cholesterol test    x  Diabetes screening test      x Diabetes self-management class     x Nutritionist referral for diabetes or renal disease     Discussion of Advance Directive   Discussed with Emil Artis Vandana his ability to prepare and advance directive in the case that an injury or illness causes him to be unable to make health care decisions. Assessment/Plan   Z00.00    ICD-10-CM ICD-9-CM    1. Medicare annual wellness visit, subsequent Z00.00 V70.0  flu vaccine given. 2. Hypothyroidism due to acquired atrophy of thyroid E03.4 244.8      246.8    3.  Essential hypertension I10 401.9          Patient Care Team:  Velma Edwards MD as PCP - General (Family Practice)  Denise Russo MD (Orthopedic Surgery)  Flo Wakefield MD as Physician (Gastroenterology)  Nikki Fontaine, RN as Ambulatory Care Navigator  [facility] [place], [place] (Optometry)        Future Appointments  Date Time Provider Rocky Zeei   2/25/2019 8:00 AM MD Nettie Singleton MD

## 2018-09-18 ENCOUNTER — PATIENT OUTREACH (OUTPATIENT)
Dept: FAMILY MEDICINE CLINIC | Age: 68
End: 2018-09-18

## 2018-09-18 NOTE — PROGRESS NOTES
ED Discharge Follow-Up Date/Time:     2018 1:00 PM 
 
Patient presented to [facility]  ED on 2018 and was diagnosed with acute bronchitis. Top Challenges reviewed with the provider  
none Method of communication with provider :none Nurse Navigator(NN) contacted the  patient  by telephone to perform post ED discharge assessment. Verified name and  with patient as identifiers. Provided introduction to self, and explanation of the Nurse Navigator role. Patient reported assessment: \"doing better\" Medication(s):  
New Medications at Discharge: Albuterol inhl, Prednisone, Zithromax Changed Medications at Discharge: none Discontinued Medications at Discharge: none There were no barriers to obtaining medications identified at this time. Reviewed discharge instructions and red flags with  patient who voiced understanding. Patient given an opportunity to ask questions and does not have any further questions or concerns at this time. The patient agrees to contact the PCP office for questions related to their healthcare. Patient reminded that there are physicians on call 24 hours a day / 7 days a week (M- 5pm to 8am and from Friday 5pm until Monday 8am for the weekend) should the patient have questions or concerns. NN provided contact information for future reference. Offered follow up appointment with PCP: already had 2018 [facility] follow up appointment(s): Future Appointments Date Time Provider Rocky Karimi 2019 8:00 AM Ventura Patricia MD [facility] follow up appointment(s): n/a [facility]:  n/a  
 [email or web address] 9 AM- 9 PM.   Phone [phone] Goals None

## 2018-10-01 ENCOUNTER — CLINICAL SUPPORT (OUTPATIENT)
Dept: FAMILY MEDICINE CLINIC | Age: 68
End: 2018-10-01

## 2018-10-01 VITALS — TEMPERATURE: 98.1 F

## 2018-10-01 DIAGNOSIS — Z23 ENCOUNTER FOR IMMUNIZATION: ICD-10-CM

## 2018-10-01 NOTE — PROGRESS NOTES
Francoise White is a 79 y.o. male who presents for routine immunizations. He denies any symptoms , reactions or allergies that would exclude them from being immunized today. Risks and adverse reactions were discussed and the VIS was given to them. All questions were addressed.     Sanjay De Leon LPN

## 2018-12-06 RX ORDER — ALBUTEROL SULFATE 90 UG/1
1 AEROSOL, METERED RESPIRATORY (INHALATION)
Qty: 1 INHALER | Refills: 5 | Status: SHIPPED | OUTPATIENT
Start: 2018-12-06 | End: 2022-07-06

## 2018-12-06 NOTE — TELEPHONE ENCOUNTER
[facility] Front Office Pool             Pt is requesting a refill for Rx Albuterol to be sent to [facility].    Best Contact Number: [phone]

## 2019-02-25 ENCOUNTER — OFFICE VISIT (OUTPATIENT)
Dept: FAMILY MEDICINE CLINIC | Age: 69
End: 2019-02-25

## 2019-02-25 VITALS
WEIGHT: 216.4 LBS | BODY MASS INDEX: 30.3 KG/M2 | HEIGHT: 71 IN | HEART RATE: 62 BPM | DIASTOLIC BLOOD PRESSURE: 80 MMHG | OXYGEN SATURATION: 97 % | SYSTOLIC BLOOD PRESSURE: 132 MMHG | RESPIRATION RATE: 20 BRPM | TEMPERATURE: 98.4 F

## 2019-02-25 DIAGNOSIS — C61 PROSTATE CANCER (HCC): ICD-10-CM

## 2019-02-25 DIAGNOSIS — M89.09 SHOULDER-HAND SYNDROME: ICD-10-CM

## 2019-02-25 DIAGNOSIS — E03.4 HYPOTHYROIDISM DUE TO ACQUIRED ATROPHY OF THYROID: ICD-10-CM

## 2019-02-25 DIAGNOSIS — E78.2 MIXED HYPERLIPIDEMIA: ICD-10-CM

## 2019-02-25 DIAGNOSIS — J45.40 MODERATE PERSISTENT ASTHMATIC BRONCHITIS WITHOUT COMPLICATION: Primary | ICD-10-CM

## 2019-02-25 DIAGNOSIS — N52.34 ERECTILE DYSFUNCTION FOLLOWING SIMPLE PROSTATECTOMY: ICD-10-CM

## 2019-02-25 DIAGNOSIS — E83.42 HYPOMAGNESEMIA: ICD-10-CM

## 2019-02-25 DIAGNOSIS — E78.00 HYPERCHOLESTEROLEMIA: ICD-10-CM

## 2019-02-25 DIAGNOSIS — I10 ESSENTIAL HYPERTENSION: ICD-10-CM

## 2019-02-25 DIAGNOSIS — Z86.73 HISTORY OF CVA (CEREBROVASCULAR ACCIDENT): ICD-10-CM

## 2019-02-25 RX ORDER — FLUTICASONE PROPIONATE 220 UG/1
1 AEROSOL, METERED RESPIRATORY (INHALATION) 2 TIMES DAILY
Qty: 1 INHALER | Refills: 5 | Status: SHIPPED | OUTPATIENT
Start: 2019-02-25 | End: 2020-02-04

## 2019-02-25 RX ORDER — SILDENAFIL 50 MG/1
50 TABLET, FILM COATED ORAL AS NEEDED
Qty: 10 TAB | Refills: 4 | Status: SHIPPED | OUTPATIENT
Start: 2019-02-25 | End: 2022-07-06

## 2019-02-25 NOTE — PROGRESS NOTES
1. Have you been to the ER, urgent care clinic since your last visit? Hospitalized since your last visit? Yes When: [facility] 2. Have you seen or consulted any other health care providers outside of the [address] [place] [address] since your last visit? Include any pap smears or colon screening. No 
Reviewed record in preparation for visit and have necessary documentation Pt did not bring medication to office visit for review Information was given to pt on Advanced Directives, Living Will Information was given on Shingles Vaccine 
opportunity was given for questions Goals that were addressed and/or need to be completed during or after this appointment include Health Maintenance Due Topic Date Due  Shingrix Vaccine Age 50> (1 of 2) 03/18/2002  AAA Screening 73-69 YO Male Smoking Patients  03/18/2017

## 2019-02-25 NOTE — PROGRESS NOTES
[address] 
[phone]  
 
 
Progress Note Patient: Kia Leach MRN: [id number]  SSN: [id number] YOB: 1950  Age: 79 y.o. Sex: male Chief Complaint Patient presents with  Cholesterol Problem Subjective:  
 
Encounter Diagnoses Name Primary?  Moderate persistent asthmatic bronchitis without complication: COPD and asthma, not currently in exacerbation. Asthma symptoms occur: daily since september. Wheezing when present is described as mild, moderate and easily relieved with rescue bronchodilator. Current limitations in activity from asthma: none. SpO2 Readings from Last 3 Encounters:  
02/25/19 97% 09/17/18 97% 09/16/18 92% Frequency of use of quick-relief meds: rare. Medication compliance: Good Patient does not smoke cigarettes. Monitors Peak Flow at home: no Yes  Mixed hyperlipidemia: 
Cardiovascular risks for him are: LDL goal is under 80 
former smoker 
prior CVA/TIA or known carotid artery disease. Key Antihyperlipidemia Meds   
    
  
 rosuvastatin (CRESTOR) 10 mg tablet  (Taking) TAKE ONE TABLET BY MOUTH AT NIGHT ON MONDAY, WEDNESDAY & FRIDAY  
 omega-3 fatty acids-vitamin e (FISH OIL) 1,000 mg Cap  (Taking) Take 1 Cap by mouth two (2) times a day. Lab Results Component Value Date/Time Cholesterol, total 185 08/24/2018 04:27 PM  
 HDL Cholesterol 53 08/24/2018 04:27 PM  
 LDL, calculated 119 (H) 08/24/2018 04:27 PM  
 Triglyceride 66 08/24/2018 04:27 PM  
 CHOL/HDL Ratio 3.9 11/10/2010 01:21 AM  
 
Lab Results Component Value Date/Time ALT (SGPT) 27 09/16/2018 07:49 AM  
 AST (SGOT) 26 09/16/2018 07:49 AM  
 Alk. phosphatase 84 09/16/2018 07:49 AM  
 Bilirubin, total 0.9 09/16/2018 07:49 AM  
 
 Myalgias: No 
 Fatigue: No 
 Other side effects: no Wt Readings from Last 3 Encounters:  
02/25/19 216 lb 6.4 oz (98.2 kg) 09/17/18 208 lb 9.6 oz (94.6 kg) 09/16/18 207 lb (93.9 kg) The patient is aware of our goal to reduce or eliminate the long term problems (such as strokes and heart attacks) related to poorly controlled hyperlipidemia.  Hypercholesterolemia: 
Cardiovascular risks for him are: hyperlipidemia 
former smoker 
prior CVA/TIA or known carotid artery disease. Key Antihyperlipidemia Meds   
    
  
 rosuvastatin (CRESTOR) 10 mg tablet  (Taking) TAKE ONE TABLET BY MOUTH AT NIGHT ON MONDAY, WEDNESDAY & FRIDAY  
 omega-3 fatty acids-vitamin e (FISH OIL) 1,000 mg Cap  (Taking) Take 1 Cap by mouth two (2) times a day. Lab Results Component Value Date/Time Cholesterol, total 185 08/24/2018 04:27 PM  
 HDL Cholesterol 53 08/24/2018 04:27 PM  
 LDL, calculated 119 (H) 08/24/2018 04:27 PM  
 Triglyceride 66 08/24/2018 04:27 PM  
 CHOL/HDL Ratio 3.9 11/10/2010 01:21 AM  
 
Lab Results Component Value Date/Time ALT (SGPT) 27 09/16/2018 07:49 AM  
 AST (SGOT) 26 09/16/2018 07:49 AM  
 Alk. phosphatase 84 09/16/2018 07:49 AM  
 Bilirubin, total 0.9 09/16/2018 07:49 AM  
 
 Myalgias: No 
 Fatigue: No 
 Other side effects: no Wt Readings from Last 3 Encounters:  
02/25/19 216 lb 6.4 oz (98.2 kg) 09/17/18 208 lb 9.6 oz (94.6 kg) 09/16/18 207 lb (93.9 kg) The patient is aware of our goal to reduce or eliminate the long term problems (such as strokes and heart attacks) related to poorly controlled hyperlipidemia.  Essential hypertension: 
BP Readings from Last 3 Encounters:  
02/25/19 132/80  
09/17/18 128/77  
09/16/18 130/81 The patient reports:  taking medications as instructed, no medication side effects noted, no TIA's, no chest pain on exertion, no dyspnea on exertion, no swelling of ankles.    
Key CAD CHF Meds   
    
  
 amLODIPine (NORVASC) 2.5 mg tablet  (Taking) TAKE ONE TABLET BY MOUTH EVERY DAY  
 rosuvastatin (CRESTOR) 10 mg tablet  (Taking) TAKE ONE TABLET BY MOUTH AT NIGHT ON MONDAY, WEDNESDAY & FRIDAY  
 aspirin (ASPIRIN) 325 mg tablet  (Taking) Take 1 Tab by mouth daily. omega-3 fatty acids-vitamin e (FISH OIL) 1,000 mg Cap  (Taking) Take 1 Cap by mouth two (2) times a day. Lab Results Component Value Date/Time Sodium 141 09/16/2018 07:49 AM  
 Potassium 4.0 09/16/2018 07:49 AM  
 Chloride 106 09/16/2018 07:49 AM  
 CO2 25 09/16/2018 07:49 AM  
 Anion gap 10 09/16/2018 07:49 AM  
 Glucose 122 (H) 09/16/2018 07:49 AM  
 BUN 15 09/16/2018 07:49 AM  
 Creatinine 0.86 09/16/2018 07:49 AM  
 BUN/Creatinine ratio 17 09/16/2018 07:49 AM  
 GFR est AA >60 09/16/2018 07:49 AM  
 GFR est non-AA >60 09/16/2018 07:49 AM  
 Calcium 9.6 09/16/2018 07:49 AM  
 Bilirubin, total 0.9 09/16/2018 07:49 AM  
 AST (SGOT) 26 09/16/2018 07:49 AM  
 Alk. phosphatase 84 09/16/2018 07:49 AM  
 Protein, total 8.1 09/16/2018 07:49 AM  
 Albumin 3.8 09/16/2018 07:49 AM  
 Globulin 4.3 (H) 09/16/2018 07:49 AM  
 A-G Ratio 0.9 (L) 09/16/2018 07:49 AM  
 ALT (SGPT) 27 09/16/2018 07:49 AM  
 
Low salt diet? yes Aerobic exercise? yes Our goal is to normalize the blood pressure to decrease the risks of strokes and heart attacks. The patient is in agreement with the plan.  Hypothyroidism due to acquired atrophy of thyroid: 
Lab Results Component Value Date/Time TSH 0.935 08/24/2018 04:27 PM  
 T4, Free 1.27 08/24/2018 04:27 PM  
 T4, Total 9.6 11/10/2010 01:21 AM  
 
 Denies fatigue, nervousness,weight changes, heat orcold intolerance, bowel changes,skin changes, cardiovascular symptoms, hair loss, feeling excessive energy, tremor, palpitations and weight loss. Thyroid medication has been unchanged since last medication check and labs.  Shoulder-hand syndrome: Vastly improved. Nearly full range of motion in his shoulder.  History of CVA (cerebrovascular accident): No recurrence of CVA or TIA.  Erectile dysfunction following  Prostatectomy: He has been taking a very expensive ED medication and wants to switch over to Viagra.  Prostate cancer [facility]): Surgical care. He sees Dr. Red Dumas yearly.  Hypomagnesemia: His magnesium was stopped when he started his albuterol at the emergency room in September. We will recheck his level today. Refusal of immunizations. He refused the new shingles vaccine and he also refused to have abdominal ultrasound. Current and past medical information: 
 
Current Medications after this visit[de-identified]    
Current Outpatient Medications Medication Sig  
 sildenafil citrate (VIAGRA) 50 mg tablet Take 1 Tab by mouth as needed. 20 minutes before relations. Max dose once daily.  fluticasone (FLOVENT HFA) 220 mcg/actuation inhaler Take 1 Puff by inhalation two (2) times a day.  amLODIPine (NORVASC) 2.5 mg tablet TAKE ONE TABLET BY MOUTH EVERY DAY  
 SYNTHROID 150 mcg tablet TAKE 1 TABLET BY MOUTH DAILY BEFORE BREAKFAST  albuterol (PROVENTIL HFA, VENTOLIN HFA, PROAIR HFA) 90 mcg/actuation inhaler Take 1 Puff by inhalation every four (4) hours as needed for Wheezing.  rosuvastatin (CRESTOR) 10 mg tablet TAKE ONE TABLET BY MOUTH AT NIGHT ON MONDAY, [address]  fexofenadine (ALLEGRA) 180 mg tablet Take 1 Tab by mouth nightly.  cetirizine (ZYRTEC) 10 mg tablet Take 1 Tab by mouth daily.  aspirin (ASPIRIN) 325 mg tablet Take 1 Tab by mouth daily.  senna (SENNA) 8.6 mg tablet Take 1 Tab by mouth daily.  cyanocobalamin (VITAMIN B-12) 1,000 mcg tablet Take 1,000 mcg by mouth daily.  omega-3 fatty acids-vitamin e (FISH OIL) 1,000 mg Cap Take 1 Cap by mouth two (2) times a day.  Phenylephrine-DM-Acetaminophen (TYLENOL COLD MULTI-SYMPTOM DAY) 5- mg/15 mL liqd Take  by mouth. Indications: COLD SYMPTOMS  fluticasone (FLONASE) 50 mcg/actuation nasal spray USE 2 SPRAYS IN EACH NOSTRIL DAILY  magnesium oxide (MAG-OX) 400 mg tablet Take 400 mg by mouth daily. No current facility-administered medications for this visit. Patient Active Problem List  
 Diagnosis Date Noted  Mixed hyperlipidemia 04/27/2010 Priority: 1 - One  Benign neoplasm of colon 04/27/2010 Priority: 1 - One  Tobacco use disorder 04/27/2010 Priority: 1 - One  Keloid 04/27/2010 Priority: 1 - One  
 Internal hemorrhoid 04/27/2010 Priority: 1 - One  Thyroglossal duct cyst 09/20/2010 Priority: 3 - Three  Poor historian 07/12/2018  Spinal stenosis of cervical region 04/20/2018  Hypercholesterolemia 04/20/2018  Pain 04/20/2018  Shoulder-hand syndrome 04/20/2018  Weakness 04/20/2018  History of CVA (cerebrovascular accident) 05/22/2015  Cerebrovascular accident (CVA) ([facility] 02/01/2015  Hypertension 11/11/2014  Prostate cancer ([facility] 04/17/2011  Hypothyroidism 09/25/2010  Boil 09/20/2010 Past Medical History:  
Diagnosis Date  Benign neoplasm of colon 4/27/2010  Cancer ([facility] PROSTATE  
 HTN (hypertension) 11/11/2014  Hypercholesterolemia [address] Internal hemorrhoid 4/27/2010  Keloid 4/27/2010  Mixed hyperlipidemia 4/27/2010  Neck mass 4/27/2010  Other ill-defined conditions(799.89) HI CHOLESTEROL  Prostate cancer ([facility] 4/17/2011  Smoker  Stroke ([facility] Per pt 2014  Thyroid disease HYPO  
 Tobacco use disorder 4/27/2010 Allergies Allergen Reactions  Lipitor [Atorvastatin] Other (comments) Other reaction(s): Cramps Myalgia  Simvastatin Other (comments) Other reaction(s): Cramps 
myalgia Past Surgical History:  
Procedure Laterality Date  HX GI    
 COLONOSCOPY  
 HX OTHER SURGICAL KELOIDS--BACK  HX PROSTATECTOMY    
 PROSTATE BIOPSY [address]  
 adhesions----1997 Social History Socioeconomic History  Marital status:   
 Spouse name: Not on file  Number of children: Not on file  Years of education: Not on file  Highest education level: Not on file Tobacco Use  Smoking status: Former Smoker Packs/day: 0.25 Years: 40.00 Pack years: 10.00 Types: Cigarettes Last attempt to quit: 2015 Years since quittin.0  Smokeless tobacco: Never Used  Tobacco comment: smokes 1/2 pack week Substance and Sexual Activity  Alcohol use: Yes Alcohol/week: 3.0 oz Types: 6 Cans of beer per week Comment: moderately  Drug use: No  
 Sexual activity: Yes Review of Systems Constitutional: Negative. Negative for chills, fever, malaise/fatigue and weight loss. HENT: Negative. Negative for hearing loss. Eyes: Negative. Negative for blurred vision and double vision. Respiratory: Positive for cough, sputum production and wheezing. Negative for hemoptysis and shortness of breath. For over 6 months he has had wheezing. He stopped smoking 4 years ago. This is a combination of asthma and COPD. Portable l x-ray back in September was unremarkable. Cardiovascular: Negative. Negative for chest pain, palpitations and orthopnea. Gastrointestinal: Negative. Negative for abdominal pain, blood in stool, heartburn, nausea and vomiting. Genitourinary: Negative. Negative for dysuria, frequency and urgency. Musculoskeletal: Negative. Negative for back pain, myalgias and neck pain. Skin: Negative. Negative for rash. Neurological: Negative. Negative for dizziness, tingling, tremors, weakness and headaches. Endo/Heme/Allergies: Negative. Psychiatric/Behavioral: Negative. Negative for depression. He had some difficulty understanding medication instructions. This is a post stroke phenomenon. Objective:  
 
Vitals:  
 19 5798 BP: 132/80 Pulse: 62 Resp: 20 Temp: 98.4 °F (36.9 °C) TempSrc: Oral  
SpO2: 97% Weight: 216 lb 6.4 oz (98.2 kg) Height: 5' 11\" (1.803 m) Body mass index is 30.18 kg/m². Physical Exam  
Constitutional: He is oriented to person, place, and time and well-developed, well-nourished, and in no distress. HENT:  
Head: Normocephalic and atraumatic. Mouth/Throat: Oropharynx is clear and moist.  
Eyes: Right eye exhibits no discharge. Left eye exhibits no discharge. No scleral icterus. Neck: No thyromegaly present. No bruit. Cardiovascular: Normal rate, regular rhythm and normal heart sounds. Pulmonary/Chest: Effort normal. He has wheezes. He has no rales. He exhibits no tenderness. He wheezes with cough. He also wheezes when he lies down at night. He uses albuterol primarily at night. Abdominal: Soft. He exhibits no distension. There is no tenderness. There is no rebound. Neurological: He is alert and oriented to person, place, and time. Skin: No rash noted. No erythema. Psychiatric: Mood and affect normal.  
Nursing note and vitals reviewed. There are no preventive care reminders to display for this patient. Assessment and orders:  
 
Encounter Diagnoses ICD-10-CM ICD-9-CM 1. Moderate persistent asthmatic bronchitis without complication M13.99 958.02  
2. Mixed hyperlipidemia E78.2 272.2 3. Hypercholesterolemia E78.00 272.0 4. Essential hypertension I10 401.9 5. Hypothyroidism due to acquired atrophy of thyroid E03.4 244.8  
  246.8 6. Shoulder-hand syndrome M89.09 337.9 7. History of CVA (cerebrovascular accident) Z86.73 V12.54  
8. Erectile dysfunction following simple prostatectomy N52.34 607.84  
9. Prostate cancer ([facility] C61 185  
10. Hypomagnesemia E83.42 275.2 Diagnoses and all orders for this visit: 
Multiple diverse medical problems necessitating extensive decision making, prolonged chart review and prolonged OV. 1. Moderate persistent asthmatic bronchitis without complication-new onset in September. -     XR CHEST PA LAT; Future -     METABOLIC PANEL, COMPREHENSIVE 
      -     fluticasone (FLOVENT HFA) 220 mcg/actuation inhaler; Take 1 Puff by inhalation two (2) times a day. 2. Mixed hyperlipidemia-retest 
-     METABOLIC PANEL, COMPREHENSIVE 
-     LIPID PANEL 3. Hypercholesterolemia-retest 
-     METABOLIC PANEL, COMPREHENSIVE 
-     LIPID PANEL 4. Essential hypertension-controlled -     METABOLIC PANEL, COMPREHENSIVE 
-     TSH 3RD GENERATION 
-     T4, FREE 5. Hypothyroidism due to acquired atrophy of thyroid-retest 
-     TSH 3RD GENERATION 
-     T4, FREE 6. Shoulder-hand syndrome-nearly full range of motion his left shoulder however he still has weakness in the left arm and shoulder. 7. History of CVA (cerebrovascular accident)-no signs of TIA or CVA 
-     LIPID PANEL 8. Erectile dysfunction following simple prostatectomy-after discussion with him he says his insurance will pay for tradename Viagra. The prescription was written for 50 mg. 
-     sildenafil citrate (VIAGRA) 50 mg tablet; Take 1 Tab by mouth as needed. 20 minutes before relations. Max dose once daily. 9. Prostate cancer ([facility])-status post prostatectomy. Sees Dr. Reyna Jordan yearly. Urinary stream reportedly okay. 10. Hypomagnesemia-retest 
-     MAGNESIUM Please note he refused shingles vaccine and abdominal ultrasound for AAA screening. Plan of care: 
Discussed diagnoses in detail with patient. Medication risks/benefits/side effects discussed with patient. All of the patient's questions were addressed. The patient understands and agrees with our plan of care. The patient knows to call back if they are unsure of or forget any changes we discussed today or if the symptoms change. The patient received an After-Visit Summary which contains VS, orders, medication list and allergy list. This can be used as a \"mini-medical record\" should they have to seek medical care while out of town. Patient Care Team: 
Stephanie Hutchinson MD as PCP - [facility]) Jayla Perez MD (Orthopedic Surgery) Celeste Hernandez MD as Physician (Gastroenterology) Jean Alford, RN as Ambulatory Care Navigator Raulito Guerrero OD (Optometry) Steven Delgadillo, RUTH ANN as Ambulatory Care Navigator Follow-up Disposition: 
Return in about 4 months (around 6/25/2019). No future appointments. Signed By: Cuca Castillo MD   
 February 25, 2019 This note was created using voice recognition software. Despite editing, there may be syntax errors.

## 2019-02-26 ENCOUNTER — TELEPHONE (OUTPATIENT)
Dept: FAMILY MEDICINE CLINIC | Age: 69
End: 2019-02-26

## 2019-02-26 LAB
ALBUMIN SERPL-MCNC: 4.3 G/DL (ref 3.6–4.8)
ALBUMIN/GLOB SERPL: 1.3 {RATIO} (ref 1.2–2.2)
ALP SERPL-CCNC: 74 IU/L (ref 39–117)
ALT SERPL-CCNC: 20 IU/L (ref 0–44)
AST SERPL-CCNC: 23 IU/L (ref 0–40)
BILIRUB SERPL-MCNC: 0.6 MG/DL (ref 0–1.2)
BUN SERPL-MCNC: 12 MG/DL (ref 8–27)
BUN/CREAT SERPL: 15 (ref 10–24)
CALCIUM SERPL-MCNC: 10 MG/DL (ref 8.6–10.2)
CHLORIDE SERPL-SCNC: 108 MMOL/L (ref 96–106)
CHOLEST SERPL-MCNC: 157 MG/DL (ref 100–199)
CO2 SERPL-SCNC: 21 MMOL/L (ref 20–29)
CREAT SERPL-MCNC: 0.78 MG/DL (ref 0.76–1.27)
GLOBULIN SER CALC-MCNC: 3.3 G/DL (ref 1.5–4.5)
GLUCOSE SERPL-MCNC: 96 MG/DL (ref 65–99)
HDLC SERPL-MCNC: 54 MG/DL
LDLC SERPL CALC-MCNC: 89 MG/DL (ref 0–99)
MAGNESIUM SERPL-MCNC: 1.9 MG/DL (ref 1.6–2.3)
POTASSIUM SERPL-SCNC: 4.5 MMOL/L (ref 3.5–5.2)
PROT SERPL-MCNC: 7.6 G/DL (ref 6–8.5)
SODIUM SERPL-SCNC: 143 MMOL/L (ref 134–144)
T4 FREE SERPL-MCNC: 1.5 NG/DL (ref 0.82–1.77)
TRIGL SERPL-MCNC: 70 MG/DL (ref 0–149)
TSH SERPL DL<=0.005 MIU/L-ACNC: 0.27 UIU/ML (ref 0.45–4.5)
VLDLC SERPL CALC-MCNC: 14 MG/DL (ref 5–40)

## 2019-02-26 NOTE — TELEPHONE ENCOUNTER
Spoke with patient and advised him per Dr. Horace Bunch:   Santos Bleacher is negative/normal.\"  Patient verbalized understanding.

## 2019-06-10 DIAGNOSIS — I10 ESSENTIAL HYPERTENSION WITH GOAL BLOOD PRESSURE LESS THAN 140/90: Chronic | ICD-10-CM

## 2019-06-10 DIAGNOSIS — E03.4 HYPOTHYROIDISM DUE TO ACQUIRED ATROPHY OF THYROID: Chronic | ICD-10-CM

## 2019-06-10 RX ORDER — LEVOTHYROXINE SODIUM 150 MCG
TABLET ORAL
Qty: 30 TAB | Refills: 0 | Status: SHIPPED | OUTPATIENT
Start: 2019-06-10 | End: 2019-07-10 | Stop reason: SDUPTHER

## 2019-07-10 ENCOUNTER — OFFICE VISIT (OUTPATIENT)
Dept: FAMILY MEDICINE CLINIC | Age: 69
End: 2019-07-10

## 2019-07-10 VITALS
HEART RATE: 69 BPM | HEIGHT: 71 IN | BODY MASS INDEX: 29.46 KG/M2 | DIASTOLIC BLOOD PRESSURE: 79 MMHG | SYSTOLIC BLOOD PRESSURE: 129 MMHG | OXYGEN SATURATION: 95 % | RESPIRATION RATE: 18 BRPM | WEIGHT: 210.4 LBS | TEMPERATURE: 98.1 F

## 2019-07-10 DIAGNOSIS — I10 ESSENTIAL HYPERTENSION WITH GOAL BLOOD PRESSURE LESS THAN 140/90: Primary | Chronic | ICD-10-CM

## 2019-07-10 DIAGNOSIS — C61 PROSTATE CANCER (HCC): Chronic | ICD-10-CM

## 2019-07-10 DIAGNOSIS — E03.4 HYPOTHYROIDISM DUE TO ACQUIRED ATROPHY OF THYROID: Chronic | ICD-10-CM

## 2019-07-10 DIAGNOSIS — E78.2 MIXED HYPERLIPIDEMIA: Chronic | ICD-10-CM

## 2019-07-10 DIAGNOSIS — M89.09 SHOULDER-HAND SYNDROME: ICD-10-CM

## 2019-07-10 DIAGNOSIS — I63.9 CEREBROVASCULAR ACCIDENT (CVA), UNSPECIFIED MECHANISM (HCC): ICD-10-CM

## 2019-07-10 DIAGNOSIS — Z23 ENCOUNTER FOR IMMUNIZATION: ICD-10-CM

## 2019-07-10 RX ORDER — LEVOTHYROXINE SODIUM 150 UG/1
TABLET ORAL
Qty: 30 TAB | Refills: 0 | Status: SHIPPED | OUTPATIENT
Start: 2019-07-10 | End: 2019-08-08 | Stop reason: SDUPTHER

## 2019-07-10 NOTE — PROGRESS NOTES
1. Have you been to the ER, urgent care clinic since your last visit? Hospitalized since your last visit? No    2. Have you seen or consulted any other health care providers outside of the [address] [place] [address] since your last visit? Include any pap smears or colon screening.  No  Reviewed record in preparation for visit and have necessary documentation  Pt did not bring medication to office visit for review  Information was given to pt on Advanced Directives, Living Will  Information was given on Shingles Vaccine  opportunity was given for questions  Goals that were addressed and/or need to be completed during or after this appointment include     Health Maintenance Due   Topic Date Due    Shingrix Vaccine Age 50> (1 of 2) 03/18/2002

## 2019-07-10 NOTE — PROGRESS NOTES
[address] [place] [address]  [phone]           Progress Note    Patient: Jono Doyle MRN: [id number]  SSN: [id number]    YOB: 1950  Age: 81 y.o. Sex: male        Chief Complaint   Patient presents with    Cholesterol Problem    Labs         Subjective:     Encounter Diagnoses   Name Primary?  Essential hypertension with goal blood pressure less than 140/90:  BP Readings from Last 3 Encounters:   07/10/19 129/79   02/25/19 132/80   09/17/18 128/77     The patient reports:  taking medications as instructed, no medication side effects noted, no TIA's, no chest pain on exertion, no dyspnea on exertion, no swelling of ankles. Key CAD CHF Meds             amLODIPine (NORVASC) 2.5 mg tablet (Taking) TAKE ONE TABLET BY MOUTH EVERY DAY    rosuvastatin (CRESTOR) 10 mg tablet (Taking) TAKE ONE TABLET BY MOUTH AT NIGHT ON MONDAY, WEDNESDAY & FRIDAY    aspirin (ASPIRIN) 325 mg tablet (Taking) Take 1 Tab by mouth daily. omega-3 fatty acids-vitamin e (FISH OIL) 1,000 mg Cap (Taking) Take 1 Cap by mouth two (2) times a day. Lab Results   Component Value Date/Time    Sodium 143 02/25/2019 09:20 AM    Potassium 4.5 02/25/2019 09:20 AM    Chloride 108 (H) 02/25/2019 09:20 AM    CO2 21 02/25/2019 09:20 AM    Anion gap 10 09/16/2018 07:49 AM    Glucose 96 02/25/2019 09:20 AM    BUN 12 02/25/2019 09:20 AM    Creatinine 0.78 02/25/2019 09:20 AM    BUN/Creatinine ratio 15 02/25/2019 09:20 AM    GFR est  02/25/2019 09:20 AM    GFR est non-AA 94 02/25/2019 09:20 AM    Calcium 10.0 02/25/2019 09:20 AM    Bilirubin, total 0.6 02/25/2019 09:20 AM    AST (SGOT) 23 02/25/2019 09:20 AM    Alk.  phosphatase 74 02/25/2019 09:20 AM    Protein, total 7.6 02/25/2019 09:20 AM    Albumin 4.3 02/25/2019 09:20 AM    Globulin 4.3 (H) 09/16/2018 07:49 AM    A-G Ratio 1.3 02/25/2019 09:20 AM    ALT (SGPT) 20 02/25/2019 09:20 AM     Low salt diet?yes  Aerobic exercise? no  Our goal is to normalize the blood pressure to decrease the risks of strokes and heart attacks. The patient is in agreement with the plan. Yes    Hypothyroidism due to acquired atrophy of thyroid:  Lab Results   Component Value Date/Time    TSH 0.266 (L) 02/25/2019 09:20 AM    T4, Free 1.50 02/25/2019 09:20 AM    T4, Total 9.6 11/10/2010 01:21 AM      Denies fatigue, nervousness,weight changes, heat orcold intolerance, bowel changes,skin changes, cardiovascular symptoms, hair loss, feeling excessive energy, tremor, palpitations and weight loss. Thyroid medication has been unchanged since last medication check and labs.  Mixed hyperlipidemia:  Cardiovascular risks for him are: hypertension  hyperlipidemia. Key Antihyperlipidemia Meds             rosuvastatin (CRESTOR) 10 mg tablet (Taking) TAKE ONE TABLET BY MOUTH AT NIGHT ON MONDAY, WEDNESDAY & FRIDAY    omega-3 fatty acids-vitamin e (FISH OIL) 1,000 mg Cap (Taking) Take 1 Cap by mouth two (2) times a day. Lab Results   Component Value Date/Time    Cholesterol, total 157 02/25/2019 09:20 AM    HDL Cholesterol 54 02/25/2019 09:20 AM    LDL, calculated 89 02/25/2019 09:20 AM    Triglyceride 70 02/25/2019 09:20 AM    CHOL/HDL Ratio 3.9 11/10/2010 01:21 AM     Lab Results   Component Value Date/Time    ALT (SGPT) 20 02/25/2019 09:20 AM    AST (SGOT) 23 02/25/2019 09:20 AM    Alk. phosphatase 74 02/25/2019 09:20 AM    Bilirubin, total 0.6 02/25/2019 09:20 AM      Myalgias: No   Fatigue: No   Other side effects: no  Wt Readings from Last 3 Encounters:   07/10/19 210 lb 6.4 oz (95.4 kg)   02/25/19 216 lb 6.4 oz (98.2 kg)   09/17/18 208 lb 9.6 oz (94.6 kg)     The patient is aware of our goal to reduce or eliminate the long term problems (such as strokes and heart attacks) related to poorly controlled hyperlipidemia.  Prostate cancer [facility]): psa =0, seeing Dr. Thomas Andres to 1day.           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  Cer ebrovascular accident (CVA), unspecified mechanism ([facility] [address]): no recurrence. Current and past medical information:    Current Medications after this visit[de-identified]     Current Outpatient Medications   Medication Sig    levothyroxine (SYNTHROID) 150 mcg tablet TAKE 1 TABLET BY MOUTH DAILY BEFORE BREAKFAST    varicella-zoster (SHINGRIX) injection Shingrix, one injection now and repeat in 4-6 months. To be administered at Pharmacy. Fax confirmation to me at [phone]. Indications: Prevention of Shingles    fluticasone propionate (FLONASE) 50 mcg/actuation nasal spray USE 2 SPRAYS IN EACH NOSTRIL DAILY    sildenafil citrate (VIAGRA) 50 mg tablet Take 1 Tab by mouth as needed. 20 minutes before relations. Max dose once daily.  fluticasone (FLOVENT HFA) 220 mcg/actuation inhaler Take 1 Puff by inhalation two (2) times a day.  amLODIPine (NORVASC) 2.5 mg tablet TAKE ONE TABLET BY MOUTH EVERY DAY    albuterol (PROVENTIL HFA, VENTOLIN HFA, PROAIR HFA) 90 mcg/actuation inhaler Take 1 Puff by inhalation every four (4) hours as needed for Wheezing.  rosuvastatin (CRESTOR) 10 mg tablet TAKE ONE TABLET BY MOUTH AT NIGHT ON MONDAY, WEDNESDAY & FRIDAY    fexofenadine (ALLEGRA) 180 mg tablet Take 1 Tab by mouth nightly.  cetirizine (ZYRTEC) 10 mg tablet Take 1 Tab by mouth daily.  aspirin (ASPIRIN) 325 mg tablet Take 1 Tab by mouth daily.  magnesium oxide (MAG-OX) 400 mg tablet Take 400 mg by mouth daily.  senna (SENNA) 8.6 mg tablet Take 1 Tab by mouth daily.  cyanocobalamin (VITAMIN B-12) 1,000 mcg tablet Take 1,000 mcg by mouth daily.  omega-3 fatty acids-vitamin e (FISH OIL) 1,000 mg Cap Take 1 Cap by mouth two (2) times a day.  Phenylephrine-DM-Acetaminophen (TYLENOL COLD MULTI-SYMPTOM DAY) 5- mg/15 mL liqd Take  by mouth. Indications: COLD SYMPTOMS     No current facility-administered medications for this visit.         Patient Active Problem List    Diagnosis Date Noted    Mixed hyperlipidemia 04/27/2010     Priority: 1 - One    Benign neoplasm of colon 04/27/2010     Priority: 1 - One    Tobacco use disorder 04/27/2010     Priority: 1 - One    Keloid 04/27/2010     Priority: 1 - One    Internal hemorrhoid 04/27/2010     Priority: 1 - One    Thyroglossal duct cyst 09/20/2010     Priority: 3 - Three    Poor historian 07/12/2018    Spinal stenosis of cervical region 04/20/2018    Hypercholesterolemia 04/20/2018    Pain 04/20/2018    Shoulder-hand syndrome 04/20/2018    Weakness 04/20/2018    History of CVA (cerebrovascular accident) 05/22/2015    Cerebrovascular accident (CVA) ([facility] [address] 02/01/2015    Hypertension 11/11/2014    Prostate cancer ([facility] [address] 04/17/2011    Hypothyroidism 09/25/2010    Boil 09/20/2010       Past Medical History:   Diagnosis Date    Benign neoplasm of colon 4/27/2010    Cancer ([facility] [address]     PROSTATE    HTN (hypertension) 11/11/2014    Hypercholesterolemia     Internal hemorrhoid 4/27/2010    Keloid 4/27/2010    Mixed hyperlipidemia 4/27/2010    Neck mass 4/27/2010    Other ill-defined conditions(799.89)     HI CHOLESTEROL    Prostate cancer ([facility] [address] 4/17/2011    Smoker     Stroke ([facility] [address]     Per pt 2014    Thyroid disease     HYPO    Tobacco use disorder 4/27/2010       Allergies   Allergen Reactions    Lipitor [Atorvastatin] Other (comments)     Other reaction(s): Cramps  Myalgia      Simvastatin Other (comments)     Other reaction(s): Cramps  myalgia         Past Surgical History:   Procedure Laterality Date    HX GI      COLONOSCOPY    HX OTHER SURGICAL      KELOIDS--BACK    HX PROSTATECTOMY      PROSTATE BIOPSY    HX SPLENECTOMY  1969    adhesions----1997       Social History     Socioeconomic History    Marital status:      Spouse name: Not on file    Number of children: Not on file    Years of education: Not on file    Highest education level: Not on file   Tobacco Use    Smoking status: Former Smoker     Packs/day: 0.25     Years: 40.00     Pack years: 10.00     Types: Cigarettes     Last attempt to quit: 2015     Years since quittin.4    Smokeless tobacco: Never Used    Tobacco comment: smokes 1/2 pack week   Substance and Sexual Activity    Alcohol use: Yes     Alcohol/week: 3.0 oz     Types: 6 Cans of beer per week     Comment: moderately    Drug use: No    Sexual activity: Yes       Review of Systems   Constitutional: Negative. Negative for chills, fever, malaise/fatigue and weight loss. HENT: Negative. Negative for hearing loss. Eyes: Negative. Negative for blurred vision and double vision. Respiratory: Negative. Negative for cough, sputum production and shortness of breath. Cardiovascular: Negative. Negative for chest pain and palpitations. Gastrointestinal: Negative. Negative for abdominal pain, blood in stool, heartburn, nausea and vomiting. Genitourinary: Negative. Negative for dysuria, frequency and urgency. Musculoskeletal: Negative. Negative for back pain, falls, myalgias and neck pain. Limited use of left arm. Skin: Negative. Negative for rash. Neurological: Negative. Negative for dizziness, tingling, tremors, weakness and headaches. Endo/Heme/Allergies: Negative. Psychiatric/Behavioral: Negative. Negative for depression. Objective:     Vitals:    07/10/19 0919 07/10/19 0946   BP: 150/87 129/79   Pulse: 69    Resp: 18    Temp: 98.1 °F (36.7 °C)    TempSrc: Oral    SpO2: 95%    Weight: 210 lb 6.4 oz (95.4 kg)    Height: 5' 11\" (1.803 m)       Body mass index is 29.34 kg/m². Physical Exam   Constitutional: He is oriented to person, place, and time and well-developed, well-nourished, and in no distress. HENT:   Head: Normocephalic and atraumatic. Mouth/Throat: Oropharynx is clear and moist.   Eyes: Right eye exhibits no discharge. Left eye exhibits no discharge. No scleral icterus.    Neck: No thyromegaly present. No bruit. Cardiovascular: Normal rate, regular rhythm and normal heart sounds. No murmur heard. Pulmonary/Chest: Effort normal and breath sounds normal. No respiratory distress. Abdominal: Soft. He exhibits no distension. There is no tenderness. Musculoskeletal: He exhibits no edema. Can barely raise his left hand to his scalp. Weakness in left arm and hand muscles   Neurological: He is alert and oriented to person, place, and time. Skin: No rash noted. No erythema. Psychiatric: Mood and affect normal.   Nursing note and vitals reviewed. Health Maintenance Due   Topic Date Due    Shingrix Vaccine Age 49> (1 of 2) 03/18/2002         Assessment and orders:     Encounter Diagnoses     ICD-10-CM ICD-9-CM   1. Essential hypertension with goal blood pressure less than 140/90 I10 401.9   2. Hypothyroidism due to acquired atrophy of thyroid E03.4 244.8     246.8   3. Mixed hyperlipidemia E78.2 272.2   4. Prostate cancer ([facility] C61 185   5. Shoulder-hand syndrome M89.09 337.9   6. Cerebrovascular accident (CVA), unspecified mechanism ([facility] I63.9 434.91     Diagnoses and all orders for this visit:    1. Essential hypertension with goal blood pressure less than 140/90  -     T4, FREE  -     METABOLIC PANEL, COMPREHENSIVE  -     MICROALBUMIN, UR, RAND W/ MICROALB/CREAT RATIO  -     HEMOGLOBIN  -     LIPID PANEL    2. Hypothyroidism due to acquired atrophy of thyroid  -     levothyroxine (SYNTHROID) 150 mcg tablet; TAKE 1 TABLET BY MOUTH DAILY BEFORE BREAKFAST  -     T4, FREE    3. Mixed hyperlipidemia  -     METABOLIC PANEL, COMPREHENSIVE  -     LIPID PANEL    4. Prostate cancer (HCC)-treated with PSA equals 0.    5. Shoulder-hand syndrome-has achieved maximal improvement. Still only 50% use of his left arm.     6. Cerebrovascular accident (CVA), unspecified mechanism (HCC)-no recurrence  -     METABOLIC PANEL, COMPREHENSIVE  -     LIPID PANEL    Encounter for immunization  - varicella-zoster [facility]) injection; Shingrix, one injection now and repeat in 4-6 months. To be administered at Pharmacy. Fax confirmation to me at [phone]. Indications: Prevention of Shingles      Plan of care:  Discussed diagnoses in detail with patient. Medication risks/benefits/side effects discussed with patient. All of the patient's questions were addressed. The patient understands and agrees with our plan of care. The patient knows to call back if they are unsure of or forget any changes we discussed today or if the symptoms change. The patient received an After-Visit Summary which contains VS, orders, medication list and allergy list. This can be used as a \"mini-medical record\" should they have to seek medical care while out of town. Patient Care Team:  Anat Candelaria MD as PCP - General (Family Practice)  Candance Cooley, MD (Orthopedic Surgery)  Joyce Slater MD as Physician (Gastroenterology)  Jennifer Spangler, RN as Ambulatory Care Navigator  Angeline Kendrick OD (Optometry)  Hazel Marcelo RN as Ambulatory Care Navigator    Follow-up and Dispositions    · Return in about 4 months (around 11/10/2019). Future Appointments   Date Time Provider Rocky Karimi   11/12/2019  8:00 AM Anat Candelaria MD [facility] SCHED       Signed By: Whitney León MD     July 10, 2019      ATTENTION:   This medical record was transcribed using an electronic medical records/speech recognition system. Although proofread, it may and can contain electronic, spelling and other errors. Corrections may be executed at a later time. Please feel free to contact me for any clarifications as needed.

## 2019-07-10 NOTE — PATIENT INSTRUCTIONS
DASH Diet: Care Instructions  Your Care Instructions    The DASH diet is an eating plan that can help lower your blood pressure. DASH stands for Dietary Approaches to Stop Hypertension. Hypertension is high blood pressure. The DASH diet focuses on eating foods that are high in calcium, potassium, and magnesium. These nutrients can lower blood pressure. The foods that are highest in these nutrients are fruits, vegetables, low-fat dairy products, nuts, seeds, and legumes. But taking calcium, potassium, and magnesium supplements instead of eating foods that are high in those nutrients does not have the same effect. The DASH diet also includes whole grains, fish, and poultry. The DASH diet is one of several lifestyle changes your doctor may recommend to lower your high blood pressure. Your doctor may also want you to decrease the amount of sodium in your diet. Lowering sodium while following the DASH diet can lower blood pressure even further than just the DASH diet alone. Follow-up care is a key part of your treatment and safety. Be sure to make and go to all appointments, and call your doctor if you are having problems. It's also a good idea to know your test results and keep a list of the medicines you take. How can you care for yourself at home? Following the DASH diet  · Eat 4 to 5 servings of fruit each day. A serving is 1 medium-sized piece of fruit, ½ cup chopped or canned fruit, 1/4 cup dried fruit, or 4 ounces (½ cup) of fruit juice. Choose fruit more often than fruit juice. · Eat 4 to 5 servings of vegetables each day. A serving is 1 cup of lettuce or raw leafy vegetables, ½ cup of chopped or cooked vegetables, or 4 ounces (½ cup) of vegetable juice. Choose vegetables more often than vegetable juice. · Get 2 to 3 servings of low-fat and fat-free dairy each day. A serving is 8 ounces of milk, 1 cup of yogurt, or 1 ½ ounces of cheese. · Eat 6 to 8 servings of grains each day.  A serving is 1 slice of bread, 1 ounce of dry cereal, or ½ cup of cooked rice, pasta, or cooked cereal. Try to choose whole-grain products as much as possible. · Limit lean meat, poultry, and fish to 2 servings each day. A serving is 3 ounces, about the size of a deck of cards. · Eat 4 to 5 servings of nuts, seeds, and legumes (cooked dried beans, lentils, and split peas) each week. A serving is 1/3 cup of nuts, 2 tablespoons of seeds, or ½ cup of cooked beans or peas. · Limit fats and oils to 2 to 3 servings each day. A serving is 1 teaspoon of vegetable oil or 2 tablespoons of salad dressing. · Limit sweets and added sugars to 5 servings or less a week. A serving is 1 tablespoon jelly or jam, ½ cup sorbet, or 1 cup of lemonade. · Eat less than 2,300 milligrams (mg) of sodium a day. If you limit your sodium to 1,500 mg a day, you can lower your blood pressure even more. Tips for success  · Start small. Do not try to make dramatic changes to your diet all at once. You might feel that you are missing out on your favorite foods and then be more likely to not follow the plan. Make small changes, and stick with them. Once those changes become habit, add a few more changes. · Try some of the following:  ? Make it a goal to eat a fruit or vegetable at every meal and at snacks. This will make it easy to get the recommended amount of fruits and vegetables each day. ? Try yogurt topped with fruit and nuts for a snack or healthy dessert. ? Add lettuce, tomato, cucumber, and onion to sandwiches. ? Combine a ready-made pizza crust with low-fat mozzarella cheese and lots of vegetable toppings. Try using tomatoes, squash, spinach, broccoli, carrots, cauliflower, and onions. ? Have a variety of cut-up vegetables with a low-fat dip as an appetizer instead of chips and dip. ? Sprinkle sunflower seeds or chopped almonds over salads. Or try adding chopped walnuts or almonds to cooked vegetables.   ? Try some vegetarian meals using beans and peas. Add garbanzo or kidney beans to salads. Make burritos and tacos with mashed hudson beans or black beans. Where can you learn more? Go to http://rosa-becky.info/. Enter A233 in the search box to learn more about \"DASH Diet: Care Instructions. \"  Current as of: July 22, 2018  Content Version: 11.9  © 0053-0027 [employer]. Care instructions adapted under license by [employer] (which disclaims liability or warranty for this information). If you have questions about a medical condition or this instruction, always ask your healthcare professional. [address] any warranty or liability for your use of this information.

## 2019-07-11 LAB
ALBUMIN SERPL-MCNC: 4.4 G/DL (ref 3.6–4.8)
ALBUMIN/CREAT UR: 23.2 MG/G CREAT (ref 0–30)
ALBUMIN/GLOB SERPL: 1.3 {RATIO} (ref 1.2–2.2)
ALP SERPL-CCNC: 75 IU/L (ref 39–117)
ALT SERPL-CCNC: 20 IU/L (ref 0–44)
AST SERPL-CCNC: 25 IU/L (ref 0–40)
BILIRUB SERPL-MCNC: 0.8 MG/DL (ref 0–1.2)
BUN SERPL-MCNC: 14 MG/DL (ref 8–27)
BUN/CREAT SERPL: 16 (ref 10–24)
CALCIUM SERPL-MCNC: 10.4 MG/DL (ref 8.6–10.2)
CHLORIDE SERPL-SCNC: 107 MMOL/L (ref 96–106)
CHOLEST SERPL-MCNC: 174 MG/DL (ref 100–199)
CO2 SERPL-SCNC: 21 MMOL/L (ref 20–29)
CREAT SERPL-MCNC: 0.89 MG/DL (ref 0.76–1.27)
CREAT UR-MCNC: 149.7 MG/DL
GLOBULIN SER CALC-MCNC: 3.3 G/DL (ref 1.5–4.5)
GLUCOSE SERPL-MCNC: 93 MG/DL (ref 65–99)
HDLC SERPL-MCNC: 59 MG/DL
HGB BLD-MCNC: 12.9 G/DL (ref 13–17.7)
LDLC SERPL CALC-MCNC: 102 MG/DL (ref 0–99)
MICROALBUMIN UR-MCNC: 34.8 UG/ML
POTASSIUM SERPL-SCNC: 4.4 MMOL/L (ref 3.5–5.2)
PROT SERPL-MCNC: 7.7 G/DL (ref 6–8.5)
SODIUM SERPL-SCNC: 142 MMOL/L (ref 134–144)
T4 FREE SERPL-MCNC: 1.43 NG/DL (ref 0.82–1.77)
TRIGL SERPL-MCNC: 67 MG/DL (ref 0–149)
VLDLC SERPL CALC-MCNC: 13 MG/DL (ref 5–40)

## 2019-10-04 ENCOUNTER — CLINICAL SUPPORT (OUTPATIENT)
Dept: FAMILY MEDICINE CLINIC | Age: 69
End: 2019-10-04

## 2019-10-04 VITALS
HEIGHT: 71 IN | WEIGHT: 210 LBS | TEMPERATURE: 97.1 F | DIASTOLIC BLOOD PRESSURE: 82 MMHG | SYSTOLIC BLOOD PRESSURE: 136 MMHG | BODY MASS INDEX: 29.4 KG/M2 | HEART RATE: 65 BPM | RESPIRATION RATE: 18 BRPM | OXYGEN SATURATION: 97 %

## 2019-10-04 DIAGNOSIS — Z23 ENCOUNTER FOR IMMUNIZATION: Primary | ICD-10-CM

## 2019-11-06 RX ORDER — ROSUVASTATIN CALCIUM 10 MG/1
TABLET, COATED ORAL
Qty: 36 TAB | Refills: 0 | Status: SHIPPED | OUTPATIENT
Start: 2019-11-06 | End: 2020-02-04 | Stop reason: SDUPTHER

## 2019-11-12 ENCOUNTER — OFFICE VISIT (OUTPATIENT)
Dept: FAMILY MEDICINE CLINIC | Age: 69
End: 2019-11-12

## 2019-11-12 ENCOUNTER — HOSPITAL ENCOUNTER (OUTPATIENT)
Dept: LAB | Age: 69
Discharge: HOME | End: 2019-11-12

## 2019-11-12 VITALS
WEIGHT: 214 LBS | BODY MASS INDEX: 29.96 KG/M2 | TEMPERATURE: 98.4 F | SYSTOLIC BLOOD PRESSURE: 136 MMHG | DIASTOLIC BLOOD PRESSURE: 83 MMHG | HEART RATE: 70 BPM | HEIGHT: 71 IN | OXYGEN SATURATION: 95 % | RESPIRATION RATE: 20 BRPM

## 2019-11-12 DIAGNOSIS — E78.2 MIXED HYPERLIPIDEMIA: Chronic | ICD-10-CM

## 2019-11-12 DIAGNOSIS — E03.4 HYPOTHYROIDISM DUE TO ACQUIRED ATROPHY OF THYROID: Chronic | ICD-10-CM

## 2019-11-12 DIAGNOSIS — I10 ESSENTIAL HYPERTENSION: Primary | Chronic | ICD-10-CM

## 2019-11-12 DIAGNOSIS — I10 ESSENTIAL HYPERTENSION: Chronic | ICD-10-CM

## 2019-11-12 DIAGNOSIS — Z86.73 HISTORY OF CVA (CEREBROVASCULAR ACCIDENT): ICD-10-CM

## 2019-11-12 DIAGNOSIS — F17.200 TOBACCO USE DISORDER: ICD-10-CM

## 2019-11-12 DIAGNOSIS — C61 PROSTATE CANCER (HCC): Chronic | ICD-10-CM

## 2019-11-12 LAB
ALBUMIN SERPL-MCNC: 4 G/DL (ref 3.5–5)
ALBUMIN/GLOB SERPL: 1 {RATIO} (ref 1.1–2.2)
ALP SERPL-CCNC: 74 U/L (ref 45–117)
ALT SERPL-CCNC: 28 U/L (ref 12–78)
ANION GAP SERPL CALC-SCNC: 4 MMOL/L (ref 5–15)
AST SERPL-CCNC: 29 U/L (ref 15–37)
BILIRUB SERPL-MCNC: 0.8 MG/DL (ref 0.2–1)
BUN SERPL-MCNC: 14 MG/DL (ref 6–20)
BUN/CREAT SERPL: 19 (ref 12–20)
CALCIUM SERPL-MCNC: 9.4 MG/DL (ref 8.5–10.1)
CHLORIDE SERPL-SCNC: 111 MMOL/L (ref 97–108)
CHOLEST SERPL-MCNC: 155 MG/DL
CO2 SERPL-SCNC: 26 MMOL/L (ref 21–32)
COMMENT, HOLDF: NORMAL
CREAT SERPL-MCNC: 0.75 MG/DL (ref 0.7–1.3)
CREAT UR-MCNC: 159 MG/DL
GLOBULIN SER CALC-MCNC: 3.9 G/DL (ref 2–4)
GLUCOSE SERPL-MCNC: 93 MG/DL (ref 65–100)
HDLC SERPL-MCNC: 59 MG/DL
HDLC SERPL: 2.6 {RATIO} (ref 0–5)
HGB BLD-MCNC: 13 G/DL (ref 12.1–17)
LDLC SERPL CALC-MCNC: 83.6 MG/DL (ref 0–100)
LIPID PROFILE,FLP: NORMAL
MICROALBUMIN UR-MCNC: 4.46 MG/DL
MICROALBUMIN/CREAT UR-RTO: 28 MG/G (ref 0–30)
POTASSIUM SERPL-SCNC: 4.1 MMOL/L (ref 3.5–5.1)
PROT SERPL-MCNC: 7.9 G/DL (ref 6.4–8.2)
SAMPLES BEING HELD,HOLD: NORMAL
SODIUM SERPL-SCNC: 141 MMOL/L (ref 136–145)
T4 FREE SERPL-MCNC: 1.2 NG/DL (ref 0.8–1.5)
TRIGL SERPL-MCNC: 62 MG/DL (ref ?–150)
TSH SERPL DL<=0.05 MIU/L-ACNC: 1.39 UIU/ML (ref 0.36–3.74)
VLDLC SERPL CALC-MCNC: 12.4 MG/DL

## 2019-11-12 PROCEDURE — 80053 COMPREHEN METABOLIC PANEL: CPT

## 2019-11-12 PROCEDURE — 80061 LIPID PANEL: CPT

## 2019-11-12 PROCEDURE — 84439 ASSAY OF FREE THYROXINE: CPT

## 2019-11-12 PROCEDURE — 84443 ASSAY THYROID STIM HORMONE: CPT

## 2019-11-12 PROCEDURE — 82043 UR ALBUMIN QUANTITATIVE: CPT

## 2019-11-12 PROCEDURE — 85018 HEMOGLOBIN: CPT

## 2019-11-12 NOTE — PROGRESS NOTES
1. Have you been to the ER, urgent care clinic since your last visit? Hospitalized since your last visit? No    2. Have you seen or consulted any other health care providers outside of the [address] [place] [address] since your last visit? Include any pap smears or colon screening. No    Reviewed record in preparation for visit and have necessary documentation  Goals that were addressed and/or need to be completed during or after this appointment include     Health Maintenance Due   Topic Date Due    Shingrix Vaccine Age 49> (1 of 2) 03/18/2002    MEDICARE YEARLY EXAM  09/18/2019       Patient is accompanied by self I have received verbal consent from Millie Alvarez to discuss any/all medical information while they are present in the room.

## 2019-11-12 NOTE — PROGRESS NOTES
[address] [place] [address], [facility]5 [facility]  [phone]           Progress Note    Patient: Sonja Teague MRN: [id number]  SSN: [id number]    YOB: 1950  Age: 81 y.o. Sex: male        Chief Complaint   Patient presents with    Labs         Subjective:     Encounter Diagnoses   Name Primary?  Essential hypertension:  BP Readings from Last 3 Encounters:   11/12/19 136/83   10/04/19 136/82   07/10/19 129/79     The patient reports:  taking medications as instructed, no medication side effects noted, no TIA's, no chest pain on exertion, no dyspnea on exertion, no swelling of ankles. Key CAD CHF Meds             rosuvastatin (CRESTOR) 10 mg tablet (Taking) TAKE ONE TABLET BY MOUTH AT NIGHT ON MONDAY, WEDNESDAY & FRIDAY    amLODIPine (NORVASC) 2.5 mg tablet (Taking) TAKE ONE TABLET BY MOUTH EVERY DAY    aspirin (ASPIRIN) 325 mg tablet (Taking) Take 1 Tab by mouth daily. omega-3 fatty acids-vitamin e (FISH OIL) 1,000 mg Cap (Taking) Take 1 Cap by mouth two (2) times a day. Lab Results   Component Value Date/Time    Sodium 142 07/10/2019 09:56 AM    Potassium 4.4 07/10/2019 09:56 AM    Chloride 107 (H) 07/10/2019 09:56 AM    CO2 21 07/10/2019 09:56 AM    Anion gap 10 09/16/2018 07:49 AM    Glucose 93 07/10/2019 09:56 AM    BUN 14 07/10/2019 09:56 AM    Creatinine 0.89 07/10/2019 09:56 AM    BUN/Creatinine ratio 16 07/10/2019 09:56 AM    GFR est  07/10/2019 09:56 AM    GFR est non-AA 88 07/10/2019 09:56 AM    Calcium 10.4 (H) 07/10/2019 09:56 AM    Bilirubin, total 0.8 07/10/2019 09:56 AM    AST (SGOT) 25 07/10/2019 09:56 AM    Alk. phosphatase 75 07/10/2019 09:56 AM    Protein, total 7.7 07/10/2019 09:56 AM    Albumin 4.4 07/10/2019 09:56 AM    Globulin 4.3 (H) 09/16/2018 07:49 AM    A-G Ratio 1.3 07/10/2019 09:56 AM    ALT (SGPT) 20 07/10/2019 09:56 AM     Low salt diet? yes  Aerobic exercise? no  Our goal is to normalize the blood pressure to decrease the risks of strokes and heart attacks. The patient is in agreement with the plan. Yes    Hypothyroidism due to acquired atrophy of thyroid     Mixed hyperlipidemia     Tobacco use disorder     Prostate cancer ([address]     History of CVA (cerebrovascular accident)              Current and past medical information:    Current Medications after this visit[de-identified]     Current Outpatient Medications   Medication Sig    rosuvastatin (CRESTOR) 10 mg tablet TAKE ONE TABLET BY MOUTH AT NIGHT ON MONDAY, WEDNESDAY & FRIDAY    amLODIPine (NORVASC) 2.5 mg tablet TAKE ONE TABLET BY MOUTH EVERY DAY    levothyroxine (SYNTHROID) 150 mcg tablet TAKE 1 TABLET BY MOUTH DAILY BEFORE BREAKFAST    fluticasone propionate (FLONASE) 50 mcg/actuation nasal spray USE 2 SPRAYS IN EACH NOSTRIL DAILY    sildenafil citrate (VIAGRA) 50 mg tablet Take 1 Tab by mouth as needed. 20 minutes before relations. Max dose once daily.  fluticasone (FLOVENT HFA) 220 mcg/actuation inhaler Take 1 Puff by inhalation two (2) times a day.  albuterol (PROVENTIL HFA, VENTOLIN HFA, PROAIR HFA) 90 mcg/actuation inhaler Take 1 Puff by inhalation every four (4) hours as needed for Wheezing.  Phenylephrine-DM-Acetaminophen (TYLENOL COLD MULTI-SYMPTOM DAY) 5- mg/15 mL liqd Take  by mouth. Indications: COLD SYMPTOMS    fexofenadine (ALLEGRA) 180 mg tablet Take 1 Tab by mouth nightly.  cetirizine (ZYRTEC) 10 mg tablet Take 1 Tab by mouth daily.  aspirin (ASPIRIN) 325 mg tablet Take 1 Tab by mouth daily.  magnesium oxide (MAG-OX) 400 mg tablet Take 400 mg by mouth daily.  senna (SENNA) 8.6 mg tablet Take 1 Tab by mouth daily.  cyanocobalamin (VITAMIN B-12) 1,000 mcg tablet Take 1,000 mcg by mouth daily.  omega-3 fatty acids-vitamin e (FISH OIL) 1,000 mg Cap Take 1 Cap by mouth two (2) times a day.  varicella-zoster [facility]) injection Shingrix, one injection now and repeat in 4-6 months.  To be administered at Pharmacy. Fax confirmation to me at [phone]. Indications: Prevention of Shingles     No current facility-administered medications for this visit.         Patient Active Problem List    Diagnosis Date Noted    Hypertension 11/11/2014     Priority: 1 - One    Prostate cancer ([facility] 04/17/2011     Priority: 1 - One    Hypothyroidism 09/25/2010     Priority: 1 - One    Mixed hyperlipidemia 04/27/2010     Priority: 1 - One    Benign neoplasm of colon 04/27/2010     Priority: 1 - One    Keloid 04/27/2010     Priority: 1 - One    Internal hemorrhoid 04/27/2010     Priority: 1 - One    Thyroglossal duct cyst 09/20/2010     Priority: 3 - Three    Poor historian 07/12/2018    Spinal stenosis of cervical region 04/20/2018    Pain 04/20/2018    Shoulder-hand syndrome 04/20/2018    Weakness 04/20/2018    History of CVA (cerebrovascular accident) 05/22/2015    Cerebrovascular accident (CVA) ([facility] 02/01/2015       Past Medical History:   Diagnosis Date    Benign neoplasm of colon 4/27/2010    Cancer ([facility]     PROSTATE    HTN (hypertension) 11/11/2014    Hypercholesterolemia     Internal hemorrhoid 4/27/2010    Keloid 4/27/2010    Mixed hyperlipidemia 4/27/2010    Neck mass 4/27/2010    Other ill-defined conditions(799.89)     HI CHOLESTEROL    Prostate cancer ([facility] 4/17/2011    Smoker     Stroke ([facility]     Per pt 2014    Thyroid disease     HYPO    Tobacco use disorder 4/27/2010       Allergies   Allergen Reactions    Lipitor [Atorvastatin] Other (comments)     Other reaction(s): Cramps  Myalgia      Simvastatin Other (comments)     Other reaction(s): Cramps  myalgia         Past Surgical History:   Procedure Laterality Date    HX GI      COLONOSCOPY    HX OTHER SURGICAL      KELOIDS--BACK    HX PROSTATECTOMY      PROSTATE BIOPSY    HX SPLENECTOMY  1969    adhesions----1997       Social History     Socioeconomic History    Marital status:      Spouse name: Not on file    Number of children: Not on file    Years of education: Not on file    Highest education level: Not on file   Tobacco Use    Smoking status: Former Smoker     Packs/day: 0.25     Years: 40.00     Pack years: 10.00     Types: Cigarettes     Last attempt to quit: 2015     Years since quittin.7    Smokeless tobacco: Never Used    Tobacco comment: smokes 1/2 pack week   Substance and Sexual Activity    Alcohol use: Yes     Alcohol/week: 5.0 standard drinks     Types: 6 Cans of beer per week     Comment: moderately    Drug use: No    Sexual activity: Yes       Review of Systems   Constitutional: Negative. Negative for chills, fever, malaise/fatigue and weight loss. HENT: Negative. Negative for hearing loss. Eyes: Negative. Negative for blurred vision and double vision. Respiratory: Negative. Negative for cough, sputum production and shortness of breath. Cardiovascular: Negative. Negative for chest pain and palpitations. Gastrointestinal: Negative. Negative for abdominal pain, blood in stool, heartburn, nausea and vomiting. Genitourinary: Negative. Negative for dysuria, frequency and urgency. Musculoskeletal: Negative. Negative for back pain, falls, myalgias and neck pain. Skin: Negative. Negative for rash. Neurological: Negative. Negative for dizziness, tingling, tremors, weakness and headaches. Endo/Heme/Allergies: Negative. Psychiatric/Behavioral: Negative. Negative for depression. Objective:     Vitals:    19 0808   BP: 136/83   Pulse: 70   Resp: 20   Temp: 98.4 °F (36.9 °C)   TempSrc: Oral   SpO2: 95%   Weight: 214 lb (97.1 kg)   Height: 5' 11\" (1.803 m)      Body mass index is 29.85 kg/m². Physical Exam   Constitutional: He is oriented to person, place, and time and well-developed, well-nourished, and in no distress. HENT:   Head: Normocephalic and atraumatic. Mouth/Throat: Oropharynx is clear and moist.   Eyes: Right eye exhibits no discharge.  Left eye exhibits no discharge. No scleral icterus. Neck: No thyromegaly present. No bruit. Scar on his neck from previous surgery. Cardiovascular: Normal rate, regular rhythm and normal heart sounds. Exam reveals no gallop and no friction rub. No murmur heard. Pulmonary/Chest: Effort normal and breath sounds normal. No respiratory distress. He has no wheezes. He has no rales. Abdominal: Soft. He exhibits no distension. Musculoskeletal: He exhibits no edema. He still has a limited range of motion and decreased use of his left arm. Neurological: He is alert and oriented to person, place, and time. Skin: No rash noted. No erythema. Psychiatric: Mood and affect normal.   Nursing note and vitals reviewed. Health Maintenance Due   Topic Date Due    MEDICARE YEARLY EXAM  09/18/2019         Assessment and orders:     Encounter Diagnoses     ICD-10-CM ICD-9-CM   1. Essential hypertension I10 401.9   2. Hypothyroidism due to acquired atrophy of thyroid E03.4 244.8     246.8   3. Mixed hyperlipidemia E78.2 272.2   4. Tobacco use disorder F17.200 305.1   5. Prostate cancer ([facility] C61 185   6. History of CVA (cerebrovascular accident) Z86.73 V12.54     Diagnoses and all orders for this visit:    1. Essential hypertension-treated and controlled  -     TSH 3RD GENERATION; Future  -     T4, FREE; Future  -     METABOLIC PANEL, COMPREHENSIVE; Future  -     MICROALBUMIN, UR, RAND W/ MICROALB/CREAT RATIO; Future  -     HEMOGLOBIN; Future  -     LIPID PANEL; Future    2. Hypothyroidism due to acquired atrophy of thyroid-retest  -     TSH 3RD GENERATION; Future  -     T4, FREE; Future    3. Mixed hyperlipidemia-retest  -     METABOLIC PANEL, COMPREHENSIVE; Future  -     LIPID PANEL; Future    4. Tobacco use disorder-resolved. Has not smoked in 5 years. 5. Prostate cancer (HCC)-sees Dr. Anitra Moore on a regular basis.     6. History of CVA (cerebrovascular accident)-no recurrent TIAs or symptoms to the suspect stroke. Plan of care:  Discussed diagnoses in detail with patient. Medication risks/benefits/side effects discussed with patient. All of the patient's questions were addressed. The patient understands and agrees with our plan of care. The patient knows to call back if they are unsure of or forget any changes we discussed today or if the symptoms change. The patient received an After-Visit Summary which contains VS, orders, medication list and allergy list. This can be used as a \"mini-medical record\" should they have to seek medical care while out of town. Patient Care Team:  Zen Lemons MD as PCP - General (Family Practice)  Zen Lemons MD as PCP - [facility] Empaneled Provider  Maite Verma MD (Orthopedic Surgery)  Kaya Cm MD as Physician (Gastroenterology)  Allie Fritz III, OD (Optometry)    Follow-up and Dispositions    · Return in about 2 weeks (around 11/26/2019) for MWE. Future Appointments   Date Time Provider Rocky Sachi   11/22/2019  1:25 PM Zen Lemons MD [facility]       Signed By: Marylee Leer, MD     November 12, 2019      ATTENTION:   This medical record was transcribed using an electronic medical records/speech recognition system. Although proofread, it may and can contain electronic, spelling and other errors. Corrections may be executed at a later time. Please feel free to contact me for any clarifications as needed.

## 2019-11-12 NOTE — PATIENT INSTRUCTIONS

## 2019-11-22 ENCOUNTER — OFFICE VISIT (OUTPATIENT)
Dept: FAMILY MEDICINE CLINIC | Age: 69
End: 2019-11-22

## 2019-11-22 VITALS
TEMPERATURE: 99 F | HEIGHT: 71 IN | BODY MASS INDEX: 30.1 KG/M2 | SYSTOLIC BLOOD PRESSURE: 126 MMHG | DIASTOLIC BLOOD PRESSURE: 76 MMHG | OXYGEN SATURATION: 95 % | RESPIRATION RATE: 18 BRPM | WEIGHT: 215 LBS | HEART RATE: 67 BPM

## 2019-11-22 DIAGNOSIS — Z00.00 MEDICARE ANNUAL WELLNESS VISIT, SUBSEQUENT: Primary | ICD-10-CM

## 2019-11-22 NOTE — PROGRESS NOTES
1. Have you been to the ER, urgent care clinic since your last visit? Hospitalized since your last visit? No    2. Have you seen or consulted any other health care providers outside of the [address] [place] [address] since your last visit? Include any pap smears or colon screening. No    Reviewed record in preparation for visit and have necessary documentation  Goals that were addressed and/or need to be completed during or after this appointment include     Health Maintenance Due   Topic Date Due    MEDICARE YEARLY EXAM  09/18/2019       Patient is accompanied by self I have received verbal consent from Naz Del Rio to discuss any/all medical information while they are present in the room.

## 2019-11-22 NOTE — PATIENT INSTRUCTIONS
Well Visit, Over 72: Care Instructions Your Care Instructions Physical exams can help you stay healthy. Your doctor has checked your overall health and may have suggested ways to take good care of yourself. He or she also may have recommended tests. At home, you can help prevent illness with healthy eating, regular exercise, and other steps. Follow-up care is a key part of your treatment and safety. Be sure to make and go to all appointments, and call your doctor if you are having problems. It's also a good idea to know your test results and keep a list of the medicines you take. How can you care for yourself at home? · Reach and stay at a healthy weight. This will lower your risk for many problems, such as obesity, diabetes, heart disease, and high blood pressure. · Get at least 30 minutes of exercise on most days of the week. Walking is a good choice. You also may want to do other activities, such as running, swimming, cycling, or playing tennis or team sports. · Do not smoke. Smoking can make health problems worse. If you need help quitting, talk to your doctor about stop-smoking programs and medicines. These can increase your chances of quitting for good. · Protect your skin from too much sun. When you're outdoors from 10 a.m. to 4 p.m., stay in the shade or cover up with clothing and a hat with a wide brim. Wear sunglasses that block UV rays. Even when it's cloudy, put broad-spectrum sunscreen (SPF 30 or higher) on any exposed skin. · See a dentist one or two times a year for checkups and to have your teeth cleaned. · Wear a seat belt in the car. Follow your doctor's advice about when to have certain tests. These tests can spot problems early. For men and women · Cholesterol. Your doctor will tell you how often to have this done based on your overall health and other things that can increase your risk for heart attack and stroke. · Blood pressure. Have your blood pressure checked during a routine doctor visit. Your doctor will tell you how often to check your blood pressure based on your age, your blood pressure results, and other factors. · Diabetes. Ask your doctor whether you should have tests for diabetes. · Vision. Experts recommend that you have yearly exams for glaucoma and other age-related eye problems. · Hearing. Tell your doctor if you notice any change in your hearing. You can have tests to find out how well you hear. · Colon cancer tests. Keep having colon cancer tests as your doctor recommends. You can have one of several types of tests. · Heart attack and stroke risk. At least every 4 to 6 years, you should have your risk for heart attack and stroke assessed. Your doctor uses factors such as your age, blood pressure, cholesterol, and whether you smoke or have diabetes to show what your risk for a heart attack or stroke is over the next 10 years. · Osteoporosis. Talk to your doctor about whether you should have a bone density test to find out whether you have thinning bones. Also ask your doctor about whether you should take calcium and vitamin D supplements. For women · Pap test and pelvic exam. You may no longer need a Pap test. Talk with your doctor about whether to stop or continue to have Pap tests. · Breast exam and mammogram. Ask how often you should have a mammogram, which is an X-ray of your breasts. A mammogram can spot breast cancer before it can be felt and when it is easiest to treat. · Thyroid disease. Talk to your doctor about whether to have your thyroid checked as part of a regular physical exam. Women have an increased chance of a thyroid problem. For men · Prostate exam. Talk to your doctor about whether you should have a blood test (called a PSA test) for prostate cancer.  Experts recommend that you discuss the benefits and risks of the test with your doctor before you decide whether to have this test. Some experts say that men ages 79 and older no longer need testing. · Abdominal aortic aneurysm. Ask your doctor whether you should have a test to check for an aneurysm. You may need a test if you ever smoked or if your parent, brother, sister, or child has had an aneurysm. When should you call for help? Watch closely for changes in your health, and be sure to contact your doctor if you have any problems or symptoms that concern you. Where can you learn more? Go to http://rosa-becky.info/. Enter M186 in the search box to learn more about \"Well Visit, Over 65: Care Instructions. \" Current as of: December 13, 2018 Content Version: 12.2 © 8728-8065 [employer]. Care instructions adapted under license by [employer] (which disclaims liability or warranty for this information). If you have questions about a medical condition or this instruction, always ask your healthcare professional. [place] [address] any warranty or liability for your use of this information.

## 2019-11-22 NOTE — PROGRESS NOTES
[facility]  [address] [facility], [address] [place] [address]             Date of visit: 11/22/2019       This is a Subsequent Medicare Annual Wellness Visit (AWV), (Performed more than 12 months after effective date of Medicare Part B enrollment and 12 months after last wellness visit)    I have reviewed the patient's medical history in detail and updated the computerized patient record. Naz Del Rio is a 81 y.o. male   History obtained from: the patient.     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    Cerebrovascular accident (CVA) ([address] I63.9    Weakness R53.1    Poor historian Z78.9     Past Medical History:   Diagnosis Date    Benign neoplasm of colon 4/27/2010    Cancer ([address]     PROSTATE    HTN (hypertension) 11/11/2014    Hypercholesterolemia     Internal hemorrhoid 4/27/2010    Keloid 4/27/2010    Mixed hyperlipidemia 4/27/2010    Neck mass 4/27/2010    Other ill-defined conditions(799.89)     HI CHOLESTEROL    Prostate cancer ([address] 4/17/2011    Smoker     Stroke ([address]     Per pt 2014    Thyroid disease     HYPO    Tobacco use disorder 4/27/2010      Past Surgical History:   Procedure Laterality Date    HX GI      COLONOSCOPY    HX OTHER SURGICAL      KELOIDS--BACK    HX PROSTATECTOMY      PROSTATE BIOPSY    HX SPLENECTOMY  1969    adhesions----1997     Allergies   Allergen Reactions    Lipitor [Atorvastatin] Other (comments)     Other reaction(s): Cramps  Myalgia      Simvastatin Other (comments)     Other reaction(s): Cramps  myalgia       Current Outpatient Medications   Medication Sig Dispense Refill    rosuvastatin (CRESTOR) 10 mg tablet TAKE ONE TABLET BY MOUTH AT NIGHT ON MONDAY, WEDNESDAY & FRIDAY 36 Tab 0    amLODIPine (NORVASC) 2.5 mg tablet TAKE ONE TABLET BY MOUTH EVERY DAY 30 Tab 5    levothyroxine (SYNTHROID) 150 mcg tablet TAKE 1 TABLET BY MOUTH DAILY BEFORE BREAKFAST 30 Tab 5    fluticasone propionate (FLONASE) 50 mcg/actuation nasal spray USE 2 SPRAYS IN EACH NOSTRIL DAILY 16 g 11    sildenafil citrate (VIAGRA) 50 mg tablet Take 1 Tab by mouth as needed. 20 minutes before relations. Max dose once daily. 10 Tab 4    fluticasone (FLOVENT HFA) 220 mcg/actuation inhaler Take 1 Puff by inhalation two (2) times a day. 1 Inhaler 5    albuterol (PROVENTIL HFA, VENTOLIN HFA, PROAIR HFA) 90 mcg/actuation inhaler Take 1 Puff by inhalation every four (4) hours as needed for Wheezing. 1 Inhaler 5    aspirin (ASPIRIN) 325 mg tablet Take 1 Tab by mouth daily.  magnesium oxide (MAG-OX) 400 mg tablet Take 400 mg by mouth daily.  senna (SENNA) 8.6 mg tablet Take 1 Tab by mouth daily.  cyanocobalamin (VITAMIN B-12) 1,000 mcg tablet Take 1,000 mcg by mouth daily.  omega-3 fatty acids-vitamin e (FISH OIL) 1,000 mg Cap Take 1 Cap by mouth two (2) times a day.  varicella-zoster [facility]) injection Shingrix, one injection now and repeat in 4-6 months. To be administered at Pharmacy. Fax confirmation to me at [phone].   Indications: Prevention of Shingles 2 Each 0     Family History   Problem Relation Age of Onset    Diabetes Father     Asthma Sister     Alcohol abuse Neg Hx     Arthritis-rheumatoid Neg Hx     Bleeding Prob Neg Hx     Cancer Neg Hx     Elevated Lipids Neg Hx     Headache Neg Hx     Heart Disease Neg Hx     Hypertension Neg Hx     Lung Disease Neg Hx     Migraines Neg Hx     Psychiatric Disorder Neg Hx     Stroke Neg Hx     Mental Retardation Neg Hx      Social History     Tobacco Use    Smoking status: Former Smoker     Packs/day: 0.25     Years: 40.00     Pack years: 10.00     Types: Cigarettes     Last attempt to quit: 2015     Years since quittin.7    Smokeless tobacco: Never Used    Tobacco comment: smokes 1/2 pack week   Substance Use Topics    Alcohol use: Yes     Alcohol/week: 5.0 standard drinks     Types: 6 Cans of beer per week     Comment: moderately       Specialists/Care Team   Spenser Álvarez. [place] Mckeon has established care with the following healthcare providers:  Patient Care Team:  Uriel Crawford MD as PCP - General (Family Practice)  Uriel Crawford MD as PCP - [facility] Empaneled Provider  Aundrea Kiser MD (Orthopedic Surgery)  Alejandrina Desai MD as Physician (Gastroenterology)  Emre Jane OD (Optometry)      Health Risk Assessment     Demographics   male  81 y.o. General Health Questions   -During the past 4 weeks:   -how would you rate your health in general? Good   -how often have you been bothered by feeling dizzy when standing up? occasionally   -how much have you been bothered by bodily pain? mildly   -Have you noticed any hearing difficulties? no   -has your physical and emotional health limited your social activities with family or friends? no    Emotional Health Questions   -Do you have a history of depression, anxiety, or emotional problems? no  -Over the past 2 weeks, have you felt down, depressed or hopeless? no  -Over the past 2 weeks, have you felt little interest or pleasure in doing things? no    Health Habits   Please describe your diet habits:   Self grade A  Do you get 5 servings of fruits or vegetables daily? yes  Do you exercise regularly? no    Alcohol Risk Factor Screening: On any occasion during the past 3 months, have you had more than 4 drinks containing alcohol?  No   Do you average more than 7 drinks per week? No     Activities of Daily Living and Functional Status   -Do you need help with eating, walking, dressing, bathing, toileting, the phone, transportation, shopping, preparing meals, housework, laundry, medications or managing money? no  -In the past four weeks, was someone available to help you if you needed and wanted help with anything? yes  -Are you confident are you that you can control and manage most of your health problems? yes  -Have you been given information to help you keep track of your medications? yes  -How often do you have trouble taking your medications as prescribed? occasionally    Fall Risk and Home Safety   Have you fallen 2 or more times in the past year? no  Does your home have rugs in the hallway, lack grab bars in the bathroom, lack handrails on the stairs or have poor lighting? no  Do you have smoke detectors and check them regularly? yes  Do you have difficulties driving a car? no  Do you always fasten your seat belt when you are in a car? yes    Review of Systems (if indicated for problems addressed today)   Review of Systems   Constitutional: Negative. Negative for chills, fever, malaise/fatigue and weight loss. HENT: Negative. Negative for hearing loss. Eyes: Negative. Negative for blurred vision and double vision. Respiratory: Negative. Negative for cough, sputum production and shortness of breath. Cardiovascular: Negative. Negative for chest pain and palpitations. Gastrointestinal: Negative. Negative for abdominal pain, blood in stool, heartburn, nausea and vomiting. Genitourinary: Negative. Negative for dysuria, frequency and urgency. Musculoskeletal: Negative. Negative for back pain, falls, myalgias and neck pain. Skin: Negative. Negative for rash. Neurological: Negative. Negative for dizziness, tingling, tremors, weakness and headaches. Endo/Heme/Allergies: Negative. Psychiatric/Behavioral: Negative. Negative for depression. Physical Examination     Wt Readings from Last 3 Encounters:   11/22/19 215 lb (97.5 kg)   11/12/19 214 lb (97.1 kg)   10/04/19 210 lb (95.3 kg)     Temp Readings from Last 3 Encounters:   11/22/19 99 °F (37.2 °C) (Oral)   11/12/19 98.4 °F (36.9 °C) (Oral)   10/04/19 97.1 °F (36.2 °C) (Oral)     BP Readings from Last 3 Encounters:   11/22/19 126/76   11/12/19 136/83   10/04/19 136/82     Pulse Readings from Last 3 Encounters:   11/22/19 67   11/12/19 70   10/04/19 65       Body mass index is 29.99 kg/m². No exam data present  Was the patient's timed Up & Go test unsteady or longer than 10 seconds? no    Evaluation of Cognitive Function   Mood/affect:  neutral  Orientation: Person, Place, Time and Situation  Appearance: age appropriate and casually dressed  Family member/caregiver input: no    Additional exam if indicated for problems addressed today:  Physical Exam  Vitals signs and nursing note reviewed. Constitutional:       Appearance: He is well-developed. He is not diaphoretic. HENT:      Head: Normocephalic and atraumatic. Right Ear: External ear normal.      Left Ear: External ear normal.   Eyes:      Conjunctiva/sclera: Conjunctivae normal.      Pupils: Pupils are equal, round, and reactive to light. Neck:      Musculoskeletal: Normal range of motion and neck supple. Thyroid: No thyromegaly. Vascular: No JVD. Trachea: No tracheal deviation. Comments: No carotid bruit. Cardiovascular:      Rate and Rhythm: Normal rate and regular rhythm. Heart sounds: Murmur present. No friction rub. Comments: 1/6 systolic murmur right precordium. Pulmonary:      Effort: Pulmonary effort is normal. No respiratory distress. Breath sounds: Normal breath sounds. No wheezing, rhonchi or rales. Abdominal:      General: Bowel sounds are normal.      Palpations: Abdomen is soft. Musculoskeletal:      Right lower leg: No edema. Left lower leg: No edema. Lymphadenopathy:      Cervical: No cervical adenopathy. Skin:     General: Skin is warm and dry. Findings: No rash. Neurological:      Mental Status: He is alert and oriented to person, place, and time. Psychiatric:         Behavior: Behavior normal.         Thought Content: Thought content normal.         Judgment: Judgment normal.         Advice/Referrals/Counseling (as indicated)           Preventive Services     (Preventive care checklist to be included in patient instructions)  Discussed today Done Previously Not Needed     x  Pneumococcal vaccines    x  Flu vaccine     x Hepatitis B vaccine (if at risk)   x   Shingles vaccine    x  TDAP vaccine    x  Digital rectal exam    x  PSA    x  Colorectal cancer screening     X<10 pk yrs Low-dose CT for lung cancer screening     x Bone density test    x  Glaucoma screening    x  Cholesterol test    x  Diabetes screening test      x Diabetes self-management class      Nutritionist referral for diabetes or renal disease     Discussion of Advance Directive   Discussed with Saroj Vargas his ability to prepare and advance directive in the case that an injury or illness causes him to be unable to make health care decisions. ACP documents explained and given. Assessment/Plan   Z00.00    ICD-10-CM ICD-9-CM    1. Medicare annual wellness visit, subsequent Z00.00 V70.0  up-to-date on immunizations that he is willing to have. Discussed ACP. Patient Care Team:  Penelope Montes MD as PCP - General (Family Practice)  Penelope Montes MD [facility] - [facility]  Zaida Pascal MD (Orthopedic Surgery)  Kayla He MD as Physician (Gastroenterology)  Sonu Strong OD (Optometry)        No future appointments.     Lestine Sever, MD

## 2020-02-04 ENCOUNTER — TELEPHONE (OUTPATIENT)
Dept: FAMILY MEDICINE CLINIC | Age: 70
End: 2020-02-04

## 2020-02-04 RX ORDER — ROSUVASTATIN CALCIUM 10 MG/1
TABLET, COATED ORAL
Qty: 36 TAB | Refills: 3 | Status: SHIPPED | OUTPATIENT
Start: 2020-02-04 | End: 2021-02-01

## 2020-02-04 NOTE — TELEPHONE ENCOUNTER
Patient walked into the clinic today to advise us that his copay for his Flovent inhaler is very high. He spoke with one of the pharmacist at [facility] and was advised of three cheaper alternatives. 1. Pulmicort Inhaler 180 MCG  2. ARNUITY Inhaler 200 mcg  3.  FLOVENT DISKUS  mg    Patient can be reached at [phone]  Please advise, thank you

## 2020-02-04 NOTE — TELEPHONE ENCOUNTER
Spoke with patient and advised him of the following per Dr. Girma Bledsoe: \"Flovent changed to Pulmicort for cost reasons. \"    Patient verbalized understanding.

## 2020-02-28 ENCOUNTER — HOSPITAL ENCOUNTER (OUTPATIENT)
Dept: LAB | Age: 70
Discharge: HOME | End: 2020-02-28

## 2020-02-28 ENCOUNTER — OFFICE VISIT (OUTPATIENT)
Dept: FAMILY MEDICINE CLINIC | Age: 70
End: 2020-02-28

## 2020-02-28 VITALS
RESPIRATION RATE: 20 BRPM | BODY MASS INDEX: 29.82 KG/M2 | HEIGHT: 71 IN | WEIGHT: 213 LBS | TEMPERATURE: 97.9 F | DIASTOLIC BLOOD PRESSURE: 76 MMHG | HEART RATE: 56 BPM | OXYGEN SATURATION: 98 % | SYSTOLIC BLOOD PRESSURE: 120 MMHG

## 2020-02-28 DIAGNOSIS — I63.9 CEREBROVASCULAR ACCIDENT (CVA), UNSPECIFIED MECHANISM (HCC): ICD-10-CM

## 2020-02-28 DIAGNOSIS — C61 PROSTATE CANCER (HCC): Chronic | ICD-10-CM

## 2020-02-28 DIAGNOSIS — M89.09 SHOULDER-HAND SYNDROME: ICD-10-CM

## 2020-02-28 DIAGNOSIS — E78.2 MIXED HYPERLIPIDEMIA: Chronic | ICD-10-CM

## 2020-02-28 DIAGNOSIS — E03.4 HYPOTHYROIDISM DUE TO ACQUIRED ATROPHY OF THYROID: Chronic | ICD-10-CM

## 2020-02-28 DIAGNOSIS — I10 ESSENTIAL HYPERTENSION: ICD-10-CM

## 2020-02-28 DIAGNOSIS — N52.31 ERECTILE DYSFUNCTION AFTER RADICAL PROSTATECTOMY: ICD-10-CM

## 2020-02-28 DIAGNOSIS — I10 ESSENTIAL HYPERTENSION: Primary | ICD-10-CM

## 2020-02-28 DIAGNOSIS — M48.02 SPINAL STENOSIS OF CERVICAL REGION: ICD-10-CM

## 2020-02-28 LAB — HGB BLD-MCNC: 12.7 G/DL (ref 12.1–17)

## 2020-02-28 NOTE — PROGRESS NOTES
1. Have you been to the ER, urgent care clinic since your last visit? Hospitalized since your last visit? No    2. Have you seen or consulted any other health care providers outside of the [address] [place] [address] since your last visit? Include any pap smears or colon screening. No    Reviewed record in preparation for visit and have necessary documentation  Goals that were addressed and/or need to be completed during or after this appointment include     Health Maintenance Due   Topic Date Due    GLAUCOMA SCREENING Q2Y  04/24/2020       Patient is accompanied by self and patient I have received verbal consent from Pallavi Magana to discuss any/all medical information while they are present in the room.

## 2020-02-28 NOTE — PROGRESS NOTES
[address]  [phone]           Progress Note    Patient: Dimitris Grace MRN: [id number]  SSN: [id number]    YOB: 1950  Age: 81 y.o. Sex: male        Chief Complaint   Patient presents with    Labs    Hypertension         Subjective:     Encounter Diagnoses   Name Primary?  Essential hypertension:  BP Readings from Last 3 Encounters:   02/28/20 120/76   11/22/19 126/76   11/12/19 136/83     The patient reports:  taking medications as instructed, no medication side effects noted, no TIA's, no chest pain on exertion, no dyspnea on exertion, no swelling of ankles. Key CAD CHF Meds             rosuvastatin (CRESTOR) 10 mg tablet (Taking) Take 1 nightly on Monday Wednesday and Friday of each week. amLODIPine (NORVASC) 2.5 mg tablet (Taking) TAKE ONE TABLET BY MOUTH EVERY DAY    aspirin (ASPIRIN) 325 mg tablet (Taking) Take 1 Tab by mouth daily. omega-3 fatty acids-vitamin e (FISH OIL) 1,000 mg Cap (Taking) Take 1 Cap by mouth two (2) times a day. Lab Results   Component Value Date/Time    Sodium 141 11/12/2019 08:42 AM    Potassium 4.1 11/12/2019 08:42 AM    Chloride 111 (H) 11/12/2019 08:42 AM    CO2 26 11/12/2019 08:42 AM    Anion gap 4 (L) 11/12/2019 08:42 AM    Glucose 93 11/12/2019 08:42 AM    BUN 14 11/12/2019 08:42 AM    Creatinine 0.75 11/12/2019 08:42 AM    BUN/Creatinine ratio 19 11/12/2019 08:42 AM    GFR est AA >60 11/12/2019 08:42 AM    GFR est non-AA >60 11/12/2019 08:42 AM    Calcium 9.4 11/12/2019 08:42 AM    Bilirubin, total 0.8 11/12/2019 08:42 AM    AST (SGOT) 29 11/12/2019 08:42 AM    Alk. phosphatase 74 11/12/2019 08:42 AM    Protein, total 7.9 11/12/2019 08:42 AM    Albumin 4.0 11/12/2019 08:42 AM    Globulin 3.9 11/12/2019 08:42 AM    A-G Ratio 1.0 (L) 11/12/2019 08:42 AM    ALT (SGPT) 28 11/12/2019 08:42 AM     Low salt diet? yes  Aerobic exercise? no  Our goal is to normalize the blood pressure to decrease the risks of strokes and heart attacks. The patient is in agreement with the plan. Yes    Mixed hyperlipidemia:  Cardiovascular risks for him are: LDL goal is under 80  former smoker  prior CVA/TIA or known carotid artery disease. Key Antihyperlipidemia Meds             rosuvastatin (CRESTOR) 10 mg tablet (Taking) Take 1 nightly on Monday Wednesday and Friday of each week. omega-3 fatty acids-vitamin e (FISH OIL) 1,000 mg Cap (Taking) Take 1 Cap by mouth two (2) times a day. Lab Results   Component Value Date/Time    Cholesterol, total 155 11/12/2019 08:42 AM    HDL Cholesterol 59 11/12/2019 08:42 AM    LDL, calculated 83.6 11/12/2019 08:42 AM    Triglyceride 62 11/12/2019 08:42 AM    CHOL/HDL Ratio 2.6 11/12/2019 08:42 AM     Lab Results   Component Value Date/Time    ALT (SGPT) 28 11/12/2019 08:42 AM    AST (SGOT) 29 11/12/2019 08:42 AM    Alk. phosphatase 74 11/12/2019 08:42 AM    Bilirubin, total 0.8 11/12/2019 08:42 AM      Myalgias: No   Fatigue: No   Other side effects: no  Wt Readings from Last 3 Encounters:   02/28/20 213 lb (96.6 kg)   11/22/19 215 lb (97.5 kg)   11/12/19 214 lb (97.1 kg)     The patient is aware of our goal to reduce or eliminate the long term problems (such as strokes and heart attacks) related to poorly controlled hyperlipidemia.  Hypothyroidism due to acquired atrophy of thyroid:  Lab Results   Component Value Date/Time    TSH 1.39 11/12/2019 08:42 AM    T4, Free 1.2 11/12/2019 08:42 AM    T4, Total 9.6 11/10/2010 01:21 AM      Denies fatigue, nervousness,weight changes, heat orcold intolerance, bowel changes,skin changes, cardiovascular symptoms, hair loss, feeling excessive energy, tremor, palpitations and weight loss. Thyroid medication has been unchanged since last medication check and labs.  Cerebrovascular accident (CVA), unspecified mechanism ([address]): Most obvious deficit on talking with him is his memory. He gets his medications confused and cannot remember when he had things done.  Prostate cancer [facility]): He is now on yearly surveillance with Dr. Mendez Thomas.  Shoulder-hand syndrome: Movement and function of his left arm is about 70% normal.  This represents a marked improvement from its original presentation.  Spinal stenosis of cervical region: Symptoms are stable.  Erectile dysfunction after radical prostatectomy: He uses Cialis and pump. Current and past medical information:    Current Medications after this visit[de-identified]     Current Outpatient Medications   Medication Sig    rosuvastatin (CRESTOR) 10 mg tablet Take 1 nightly on Monday Wednesday and Friday of each week.  budesonide (PULMICORT) 180 mcg/actuation aepb inhaler Take 1 Puff by inhalation two (2) times a day.  amLODIPine (NORVASC) 2.5 mg tablet TAKE ONE TABLET BY MOUTH EVERY DAY    levothyroxine (SYNTHROID) 150 mcg tablet TAKE 1 TABLET BY MOUTH DAILY BEFORE BREAKFAST    fluticasone propionate (FLONASE) 50 mcg/actuation nasal spray USE 2 SPRAYS IN EACH NOSTRIL DAILY    sildenafil citrate (VIAGRA) 50 mg tablet Take 1 Tab by mouth as needed. 20 minutes before relations. Max dose once daily.  albuterol (PROVENTIL HFA, VENTOLIN HFA, PROAIR HFA) 90 mcg/actuation inhaler Take 1 Puff by inhalation every four (4) hours as needed for Wheezing.  aspirin (ASPIRIN) 325 mg tablet Take 1 Tab by mouth daily.  magnesium oxide (MAG-OX) 400 mg tablet Take 400 mg by mouth daily.  senna (SENNA) 8.6 mg tablet Take 1 Tab by mouth daily.  cyanocobalamin (VITAMIN B-12) 1,000 mcg tablet Take 1,000 mcg by mouth daily.  omega-3 fatty acids-vitamin e (FISH OIL) 1,000 mg Cap Take 1 Cap by mouth two (2) times a day.  varicella-zoster [facility]) injection Shingrix, one injection now and repeat in 4-6 months. To be administered at Pharmacy. Fax confirmation to me at [phone].   Indications: Prevention of Shingles No current facility-administered medications for this visit.         Patient Active Problem List    Diagnosis Date Noted    Hypertension 11/11/2014     Priority: 1 - One    Prostate cancer ([facility] [address] 04/17/2011     Priority: 1 - One    Hypothyroidism 09/25/2010     Priority: 1 - One    Mixed hyperlipidemia 04/27/2010     Priority: 1 - One    Benign neoplasm of colon 04/27/2010     Priority: 1 - One    Keloid 04/27/2010     Priority: 1 - One    Internal hemorrhoid 04/27/2010     Priority: 1 - One    Thyroglossal duct cyst 09/20/2010     Priority: 3 - Three    Poor historian 07/12/2018    Spinal stenosis of cervical region 04/20/2018    Pain 04/20/2018    Shoulder-hand syndrome 04/20/2018    Weakness 04/20/2018    History of CVA (cerebrovascular accident) 05/22/2015    Cerebrovascular accident (CVA) ([facility] [address] 02/01/2015       Past Medical History:   Diagnosis Date    Benign neoplasm of colon 4/27/2010    Cancer ([facility] [address]     PROSTATE    HTN (hypertension) 11/11/2014    Hypercholesterolemia     Internal hemorrhoid 4/27/2010    Keloid 4/27/2010    Mixed hyperlipidemia 4/27/2010    Neck mass 4/27/2010    Other ill-defined conditions(799.89)     HI CHOLESTEROL    Prostate cancer ([facility] [address] 4/17/2011    Smoker     Stroke ([facility] [address]     Per pt 2014    Thyroid disease     HYPO    Tobacco use disorder 4/27/2010       Allergies   Allergen Reactions    Lipitor [Atorvastatin] Other (comments)     Other reaction(s): Cramps  Myalgia      Simvastatin Other (comments)     Other reaction(s): Cramps  myalgia         Past Surgical History:   Procedure Laterality Date    HX GI      COLONOSCOPY    HX OTHER SURGICAL      KELOIDS--BACK    HX PROSTATECTOMY      PROSTATE BIOPSY    HX SPLENECTOMY  1969    adhesions----1997       Social History     Socioeconomic History    Marital status:      Spouse name: Not on file    Number of children: Not on file    Years of education: Not on file    Highest education level: Not on file   Tobacco Use    Smoking status: Former Smoker     Packs/day: 0.25     Years: 40.00     Pack years: 10.00     Types: Cigarettes     Last attempt to quit: 2015     Years since quittin.0    Smokeless tobacco: Never Used    Tobacco comment: smokes 1/2 pack week   Substance and Sexual Activity    Alcohol use: Yes     Alcohol/week: 5.0 standard drinks     Types: 6 Cans of beer per week     Comment: moderately    Drug use: No    Sexual activity: Yes       Review of Systems   Constitutional: Negative. Negative for chills, fever, malaise/fatigue and weight loss. HENT: Negative. Negative for hearing loss. Eyes: Negative. Negative for blurred vision and double vision. Respiratory: Negative. Negative for cough, sputum production and shortness of breath. Cardiovascular: Negative. Negative for chest pain and palpitations. Gastrointestinal: Negative. Negative for abdominal pain, blood in stool, heartburn, nausea and vomiting. Genitourinary: Negative. Negative for dysuria, frequency and urgency. Musculoskeletal: Negative. Negative for back pain, falls, myalgias and neck pain. Some disuse of his left arm and hand. Skin: Negative. Negative for rash. Neurological: Negative. Negative for dizziness, tingling, tremors, weakness and headaches. Endo/Heme/Allergies: Negative. Psychiatric/Behavioral: Positive for memory loss. Negative for depression. Objective:     Vitals:    20 0807   BP: 120/76   Pulse: (!) 56   Resp: 20   Temp: 97.9 °F (36.6 °C)   TempSrc: Oral   SpO2: 98%   Weight: 213 lb (96.6 kg)   Height: 5' 11\" (1.803 m)      Body mass index is 29.71 kg/m². Physical Exam  Vitals signs and nursing note reviewed. Constitutional:       General: He is not in acute distress. Appearance: Normal appearance. He is well-developed. He is not toxic-appearing. HENT:      Head: Normocephalic and atraumatic. Nose: No congestion.       Mouth/Throat: Pharynx: No oropharyngeal exudate or posterior oropharyngeal erythema. Eyes:      General: No scleral icterus. Right eye: No discharge. Left eye: No discharge. Cardiovascular:      Rate and Rhythm: Normal rate and regular rhythm. Heart sounds: Normal heart sounds. No murmur. No friction rub. No gallop. Pulmonary:      Effort: Pulmonary effort is normal. No respiratory distress. Breath sounds: Normal breath sounds. No wheezing, rhonchi or rales. Abdominal:      General: There is no distension. Musculoskeletal:         General: No swelling. Skin:     General: Skin is warm. Coloration: Skin is not jaundiced. Findings: No erythema or rash. Neurological:      Mental Status: He is alert. Psychiatric:         Mood and Affect: Mood normal.       Health Maintenance Due   Topic Date Due    GLAUCOMA SCREENING Q2Y  04/24/2020         Assessment and orders:     Encounter Diagnoses     ICD-10-CM ICD-9-CM   1. Essential hypertension I10 401.9   2. Mixed hyperlipidemia E78.2 272.2   3. Hypothyroidism due to acquired atrophy of thyroid E03.4 244.8     246.8   4. Cerebrovascular accident (CVA), unspecified mechanism ([facility] [address] I63.9 434.91   5. Prostate cancer ([facility] [address] C61 185   6. Shoulder-hand syndrome M89.09 337.9   7. Spinal stenosis of cervical region M48.02 723.0   8. Erectile dysfunction after radical prostatectomy N52.31 607.84     Diagnoses and all orders for this visit:    1. Essential hypertension-well-controlled  -     T4, FREE; Future  -     METABOLIC PANEL, COMPREHENSIVE; Future  -     HEMOGLOBIN; Future  -     LIPID PANEL; Future    2. Mixed hyperlipidemia-retest  -     METABOLIC PANEL, COMPREHENSIVE; Future  -     LIPID PANEL; Future    3. Hypothyroidism due to acquired atrophy of thyroid-retest  -     T4, FREE; Future    4. Cerebrovascular accident (CVA), unspecified mechanism (HCC)-retest lipids  -     LIPID PANEL; Future    5.  Prostate cancer (HCC)-see Dr. Sunni Leblanc in 2 months    6. Shoulder-hand syndrome-stable    7. Spinal stenosis of cervical region-stable    8. Erectile dysfunction after radical prostatectomy-treated            Plan of care:  Discussed diagnoses in detail with patient. Medication risks/benefits/side effects discussed with patient. All of the patient's questions were addressed. The patient understands and agrees with our plan of care. The patient knows to call back if they are unsure of or forget any changes we discussed today or if the symptoms change. The patient received an After-Visit Summary which contains VS, orders, medication list and allergy list. This can be used as a \"mini-medical record\" should they have to seek medical care while out of town. Patient Care Team:  Andrea Montemayor MD as PCP - General (Family Practice)  Andrea Montemayor MD as PCP - [facility] Empaneled Provider  Flo Saucedo MD (Orthopedic Surgery)  Juni Liao MD as Physician (Gastroenterology)  Nolberto Greer OD (Optometry)          Signed By: Richie Ramirez MD     February 28, 2020      ATTENTION:   This medical record was transcribed using an electronic medical records/speech recognition system. Although proofread, it may and can contain electronic, spelling and other errors. Corrections may be executed at a later time. Please feel free to contact me for any clarifications as needed.

## 2020-02-29 LAB
ALBUMIN SERPL-MCNC: 4 G/DL (ref 3.5–5)
ALBUMIN/GLOB SERPL: 1.1 {RATIO} (ref 1.1–2.2)
ALP SERPL-CCNC: 70 U/L (ref 45–117)
ALT SERPL-CCNC: 27 U/L (ref 12–78)
ANION GAP SERPL CALC-SCNC: 5 MMOL/L (ref 5–15)
AST SERPL-CCNC: 24 U/L (ref 15–37)
BILIRUB SERPL-MCNC: 0.7 MG/DL (ref 0.2–1)
BUN SERPL-MCNC: 16 MG/DL (ref 6–20)
BUN/CREAT SERPL: 19 (ref 12–20)
CALCIUM SERPL-MCNC: 9.4 MG/DL (ref 8.5–10.1)
CHLORIDE SERPL-SCNC: 111 MMOL/L (ref 97–108)
CHOLEST SERPL-MCNC: 156 MG/DL
CO2 SERPL-SCNC: 25 MMOL/L (ref 21–32)
CREAT SERPL-MCNC: 0.85 MG/DL (ref 0.7–1.3)
GLOBULIN SER CALC-MCNC: 3.6 G/DL (ref 2–4)
GLUCOSE SERPL-MCNC: 100 MG/DL (ref 65–100)
HDLC SERPL-MCNC: 63 MG/DL
HDLC SERPL: 2.5 {RATIO} (ref 0–5)
LDLC SERPL CALC-MCNC: 82.4 MG/DL (ref 0–100)
LIPID PROFILE,FLP: NORMAL
POTASSIUM SERPL-SCNC: 4.2 MMOL/L (ref 3.5–5.1)
PROT SERPL-MCNC: 7.6 G/DL (ref 6.4–8.2)
SODIUM SERPL-SCNC: 141 MMOL/L (ref 136–145)
T4 FREE SERPL-MCNC: 1.1 NG/DL (ref 0.8–1.5)
TRIGL SERPL-MCNC: 53 MG/DL (ref ?–150)
VLDLC SERPL CALC-MCNC: 10.6 MG/DL

## 2020-05-29 ENCOUNTER — VIRTUAL VISIT (OUTPATIENT)
Dept: FAMILY MEDICINE CLINIC | Age: 70
End: 2020-05-29

## 2020-05-29 DIAGNOSIS — Z86.73 HISTORY OF CVA (CEREBROVASCULAR ACCIDENT): ICD-10-CM

## 2020-05-29 DIAGNOSIS — E78.2 MIXED HYPERLIPIDEMIA: Chronic | ICD-10-CM

## 2020-05-29 DIAGNOSIS — E03.4 HYPOTHYROIDISM DUE TO ACQUIRED ATROPHY OF THYROID: Chronic | ICD-10-CM

## 2020-05-29 DIAGNOSIS — I10 ESSENTIAL HYPERTENSION: Primary | Chronic | ICD-10-CM

## 2020-05-29 DIAGNOSIS — R25.2 MUSCLE CRAMPS: ICD-10-CM

## 2020-05-29 NOTE — PROGRESS NOTES
[address]  [phone]           Progress Note    Patient: Naif Diaz MRN: [id number]  SSN: [id number]    YOB: 1950  Age: 78 y.o. Sex: male        Chief Complaint   Patient presents with    Hypertension     3 month follow up         Naif Diaz is a 78 y.o. male evaluated via telephone on 5/29/2020. Nurse involved in care:Nurse Borum  Location of patient:Home  Location of physician:My office    Consent:  He and/or health care decision maker is aware that that he may receive a bill for this telephone service, depending on his insurance coverage, and has provided verbal consent to proceed: Yes      Documentation:  I communicated with the patient and/or health care decision maker about multiple problems. Details of this discussion including any medical advice provided: See Below. I affirm this is a Patient Initiated Episode with an Established Patient who has not had a related appointment within my department in the past 7 days or scheduled within the next 24 hours. Total Time: minutes: 21-30 minutes    Note: not billable if this call serves to triage the patient into an appointment for the relevant concern      Eliza Grant MD   __________________________________________________________________________________________    Subjective:     Encounter Diagnoses   Name Primary?  Essential hypertension:  BP Readings from Last 3 Encounters:   02/28/20 120/76   11/22/19 126/76   11/12/19 136/83     The patient reports:  taking medications as instructed, no medication side effects noted, no TIA's, no chest pain on exertion, no dyspnea on exertion, no swelling of ankles. Key CAD CHF Meds             rosuvastatin (CRESTOR) 10 mg tablet Take 1 nightly on Monday Wednesday and Friday of each week.     amLODIPine (NORVASC) 2.5 mg tablet TAKE ONE TABLET BY MOUTH EVERY DAY    aspirin (ASPIRIN) 325 mg tablet Take 1 Tab by mouth daily. omega-3 fatty acids-vitamin e (FISH OIL) 1,000 mg Cap Take 1 Cap by mouth two (2) times a day. Lab Results   Component Value Date/Time    Sodium 141 02/28/2020 08:30 AM    Potassium 4.2 02/28/2020 08:30 AM    Chloride 111 (H) 02/28/2020 08:30 AM    CO2 25 02/28/2020 08:30 AM    Anion gap 5 02/28/2020 08:30 AM    Glucose 100 02/28/2020 08:30 AM    BUN 16 02/28/2020 08:30 AM    Creatinine 0.85 02/28/2020 08:30 AM    BUN/Creatinine ratio 19 02/28/2020 08:30 AM    GFR est AA >60 02/28/2020 08:30 AM    GFR est non-AA >60 02/28/2020 08:30 AM    Calcium 9.4 02/28/2020 08:30 AM    Bilirubin, total 0.7 02/28/2020 08:30 AM    Alk. phosphatase 70 02/28/2020 08:30 AM    Protein, total 7.6 02/28/2020 08:30 AM    Albumin 4.0 02/28/2020 08:30 AM    Globulin 3.6 02/28/2020 08:30 AM    A-G Ratio 1.1 02/28/2020 08:30 AM    ALT (SGPT) 27 02/28/2020 08:30 AM     Low salt diet? yes  Aerobic exercise? no  Our goal is to normalize the blood pressure to decrease the risks of strokes and heart attacks. The patient is in agreement with the plan. Yes    Hypothyroidism due to acquired atrophy of thyroid:  Lab Results   Component Value Date/Time    TSH 1.39 11/12/2019 08:42 AM    T4, Free 1.1 02/28/2020 08:30 AM    T4, Total 9.6 11/10/2010 01:21 AM      Denies fatigue, nervousness,weight changes, heat orcold intolerance, bowel changes,skin changes, cardiovascular symptoms, hair loss, feeling excessive energy, tremor, palpitations and weight loss. Thyroid medication has been unchanged since last medication check and labs.  Mixed hyperlipidemia:  Cardiovascular risks for him are: LDL goal is under 100  hypertension  hyperlipidemia. Key Antihyperlipidemia Meds             rosuvastatin (CRESTOR) 10 mg tablet Take 1 nightly on Monday Wednesday and Friday of each week. omega-3 fatty acids-vitamin e (FISH OIL) 1,000 mg Cap Take 1 Cap by mouth two (2) times a day.         Lab Results Component Value Date/Time    Cholesterol, total 156 02/28/2020 08:30 AM    HDL Cholesterol 63 02/28/2020 08:30 AM    LDL, calculated 82.4 02/28/2020 08:30 AM    Triglyceride 53 02/28/2020 08:30 AM    CHOL/HDL Ratio 2.5 02/28/2020 08:30 AM     Lab Results   Component Value Date/Time    ALT (SGPT) 27 02/28/2020 08:30 AM    Alk. phosphatase 70 02/28/2020 08:30 AM    Bilirubin, total 0.7 02/28/2020 08:30 AM      Myalgias: No   Fatigue: No   Other side effects: no  Wt Readings from Last 3 Encounters:   02/28/20 213 lb (96.6 kg)   11/22/19 215 lb (97.5 kg)   11/12/19 214 lb (97.1 kg)     The patient is aware of our goal to reduce or eliminate the long term problems (such as strokes and heart attacks) related to poorly controlled hyperlipidemia.  History of CVA (cerebrovascular accident): No TIA symptoms or symptoms of stroke recurrence. 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 Advised that this is not healthy and could lead to blood clots in his calves. He needs to get out of the truck and walk or move around or exercise his calf muscles every hour. His Crestor may also be involved in the cramping. Current and past medical information:    Current Medications after this visit[de-identified]     Current Outpatient Medications   Medication Sig    rosuvastatin (CRESTOR) 10 mg tablet Take 1 nightly on Monday Wednesday and Friday of each week.  budesonide (PULMICORT) 180 mcg/actuation aepb inhaler Take 1 Puff by inhalation two (2) times a day.  amLODIPine (NORVASC) 2.5 mg tablet TAKE ONE TABLET BY MOUTH EVERY DAY    levothyroxine (SYNTHROID) 150 mcg tablet TAKE 1 TABLET BY MOUTH DAILY BEFORE BREAKFAST    varicella-zoster (SHINGRIX) injection Shingrix, one injection now and repeat in 4-6 months. To be administered at Pharmacy. Fax confirmation to me at [phone].   Indications: Prevention of Shingles    fluticasone propionate (FLONASE) 50 mcg/actuation nasal spray USE 2 SPRAYS IN EACH NOSTRIL DAILY    sildenafil citrate (VIAGRA) 50 mg tablet Take 1 Tab by mouth as needed. 20 minutes before relations. Max dose once daily.  albuterol (PROVENTIL HFA, VENTOLIN HFA, PROAIR HFA) 90 mcg/actuation inhaler Take 1 Puff by inhalation every four (4) hours as needed for Wheezing.  aspirin (ASPIRIN) 325 mg tablet Take 1 Tab by mouth daily.  magnesium oxide (MAG-OX) 400 mg tablet Take 400 mg by mouth daily.  senna (SENNA) 8.6 mg tablet Take 1 Tab by mouth daily.  cyanocobalamin (VITAMIN B-12) 1,000 mcg tablet Take 1,000 mcg by mouth daily.  omega-3 fatty acids-vitamin e (FISH OIL) 1,000 mg Cap Take 1 Cap by mouth two (2) times a day. No current facility-administered medications for this visit.         Patient Active Problem List    Diagnosis Date Noted    History of CVA (cerebrovascular accident) 05/22/2015     Priority: 1 - One    Hypertension 11/11/2014     Priority: 1 - One    [facility] 04/17/2011     Priority: 1 - One    Hypothyroidism 09/25/2010     Priority: 1 - One    Mixed hyperlipidemia 04/27/2010     Priority: 1 - One    Benign neoplasm of colon 04/27/2010     Priority: 1 - One    Keloid 04/27/2010     Priority: 1 - One    Internal hemorrhoid 04/27/2010     Priority: 1 - One    Thyroglossal duct cyst 09/20/2010     Priority: 3 - Three    Poor historian 07/12/2018    Spinal stenosis of cervical region 04/20/2018    Pain 04/20/2018    Shoulder-hand syndrome 04/20/2018    Weakness 04/20/2018    C[facility]brovascular accident (CVA) ([facility] 02/01/2015       Past Medical History:   Diagnosis Date    Benign neoplasm of colon 4/27/2010    Cancer ([facility]     PROSTATE    HTN (hypertension) 11/11/2014    Hypercholesterolemia     Internal hemorrhoid 4/27/2010    Keloid 4/27/2010    Mixed hyperlipidemia 4/27/2010    Neck mass 4/27/2010    Other ill-defined conditions(799.89)     HI CHOLESTEROL    [facility] cancer [facility] 2011    Smoker     Stroke [facility])     Per pt     Thyroid disease     HYPO    Tobacco use disorder 2010       Allergies   Allergen Reactions    Lipitor [Atorvastatin] Other (comments)     Other reaction(s): Cramps  Myalgia      Simvastatin Other (comments)     Other reaction(s): Cramps  myalgia         Past Surgical History:   Procedure Laterality Date    HX GI      COLONOSCOPY    HX OTHER SURGICAL      KELOIDS--BACK    HX PROSTATECTOMY      PROSTATE BIOPSY    HX SPLENECTOMY  1969    adhesions----       Social History     Socioeconomic History    Marital status:      Spouse name: Not on file    Number of children: Not on file    Years of education: Not on file    Highest education level: Not on file   Tobacco Use    Smoking status: Former Smoker     Packs/day: 0.25     Years: 40.00     Pack years: 10.00     Types: Cigarettes     Last attempt to quit: 2015     Years since quittin.3    Smokeless tobacco: Never Used    Tobacco comment: smokes 1/2 pack week   Substance and Sexual Activity    Alcohol use: Yes     Alcohol/week: 5.0 standard drinks     Types: 6 Cans of beer per week     Comment: moderately    Drug use: No    Sexual activity: Yes       Review of Systems   Constitutional: Negative. Negative for chills, fever, malaise/fatigue and weight loss. HENT: Negative. Negative for hearing loss. Eyes: Negative. Negative for blurred vision and double vision. Respiratory: Negative. Negative for cough, sputum production and shortness of breath. Cardiovascular: Negative. Negative for chest pain and palpitations. Gastrointestinal: Negative. Negative for abdominal pain, blood in stool, heartburn, nausea and vomiting. Genitourinary: Negative. Negative for dysuria, frequency and urgency. Musculoskeletal: Negative. Negative for back pain, falls, myalgias and neck pain. Skin: Negative. Negative for rash. Neurological: Negative. Negative for dizziness, tingling, tremors, weakness and headaches. Endo/Heme/Allergies: Negative. Psychiatric/Behavioral: Negative. Negative for depression. Objective: There were no vitals filed for this visit. There is no height or weight on file to calculate BMI. Health Maintenance Due   Topic Date Due    AAA Screening 73-69 YO Male Smoking Patients  03/18/2017    GLAUCOMA SCREENING Q2Y  04/24/2020         Assessment and orders:     Encounter Diagnoses     ICD-10-CM ICD-9-CM   1. Essential hypertension I10 401.9   2. Hypothyroidism due to acquired atrophy of thyroid E03.4 244.8     246.8   3. Mixed hyperlipidemia E78.2 272.2   4. History of CVA (cerebrovascular accident) Z86.73 V12.54   5. Muscle cramps R25.2 729.82     Diagnoses and all orders for this visit:    1. Essential hypertension-controlled    2. Hypothyroidism due to acquired atrophy of thyroid-last lab work was excellent    3. Mixed hyperlipidemia-controlled    4. History of CVA (cerebrovascular accident)-no recurrence    5. Muscle cramps-new problem see above. Plan of care:  Discussed diagnoses in detail with patient. Medication risks/benefits/side effects discussed with patient. All of the patient's questions were addressed. The patient understands and agrees with our plan of care. The patient knows to call back if they are unsure of or forget any changes we discussed today or if the symptoms change. The patient received an After-Visit Summary which contains VS, orders, medication list and allergy list. This can be used as a \"mini-medical record\" should they have to seek medical care while out of town.     Patient Care Team:  Cas Hernandez MD as PCP - General (Family Practice)  Cas Hernandez MD as PCP - [facility] Empaneled Provider  Kevin Willis MD (Orthopedic Surgery)  Jessica Hernández MD as Physician (Gastroenterology)  Miranda Chappell, [address] (Optometry)          Signed By: Hansa Thacker Richie Resendiz MD     May 29, 2020      ATTENTION:   This medical record was transcribed using an electronic medical records/speech recognition system. Although proofread, it may and can contain electronic, spelling and other errors. Corrections may be executed at a later time. Please feel free to contact me for any clarifications as needed.

## 2020-06-15 NOTE — PATIENT INSTRUCTIONS

## 2020-09-02 ENCOUNTER — CLINICAL SUPPORT (OUTPATIENT)
Dept: FAMILY MEDICINE CLINIC | Age: 70
End: 2020-09-02
Payer: MEDICARE

## 2020-09-02 VITALS — TEMPERATURE: 97.3 F

## 2020-09-02 DIAGNOSIS — Z23 ENCOUNTER FOR IMMUNIZATION: Primary | ICD-10-CM

## 2020-09-02 PROCEDURE — 90653 IIV ADJUVANT VACCINE IM: CPT | Performed by: FAMILY MEDICINE

## 2020-09-02 PROCEDURE — G0008 ADMIN INFLUENZA VIRUS VAC: HCPCS | Performed by: FAMILY MEDICINE

## 2020-09-30 ENCOUNTER — VIRTUAL VISIT (OUTPATIENT)
Dept: FAMILY MEDICINE CLINIC | Age: 70
End: 2020-09-30
Payer: MEDICARE

## 2020-09-30 DIAGNOSIS — E03.4 HYPOTHYROIDISM DUE TO ACQUIRED ATROPHY OF THYROID: Chronic | ICD-10-CM

## 2020-09-30 DIAGNOSIS — I10 ESSENTIAL HYPERTENSION: Primary | Chronic | ICD-10-CM

## 2020-09-30 DIAGNOSIS — E53.8 B12 DEFICIENCY: ICD-10-CM

## 2020-09-30 DIAGNOSIS — M48.02 SPINAL STENOSIS OF CERVICAL REGION: ICD-10-CM

## 2020-09-30 DIAGNOSIS — E78.2 MIXED HYPERLIPIDEMIA: Chronic | ICD-10-CM

## 2020-09-30 PROBLEM — G89.29 CHRONIC MIDLINE LOW BACK PAIN WITH LEFT-SIDED SCIATICA: Chronic | Status: ACTIVE | Noted: 2020-09-30

## 2020-09-30 PROBLEM — M54.42 CHRONIC MIDLINE LOW BACK PAIN WITH LEFT-SIDED SCIATICA: Chronic | Status: ACTIVE | Noted: 2020-09-30

## 2020-09-30 PROCEDURE — 99442 PR PHYS/QHP TELEPHONE EVALUATION 11-20 MIN: CPT | Performed by: FAMILY MEDICINE

## 2020-09-30 NOTE — PROGRESS NOTES
Ara Acosta is a 78 y.o. male evaluated via Telephone on 20. Patient Identity confirmed by . Consent:  He and/or health care decision maker is aware that that he may receive a bill for this telephone service, depending on his insurance coverage, and has provided verbal consent to proceed: Yes    Physician Location: Office  Patient Location: Home  Nurse Assisting with Encounter: Arsenio Lamar LPN    Chief Complaint   Patient presents with    Follow-up      Information gathered from patient and/or health care decision maker. HPI:   Patient with history of DDD and has had injections in the past. Which have helped, but now having pain that occasionally radiates in the left leg now. Pain occurs from day to day     Hypertension Follow up:  Currently Taking Amlodipine 2.5 mg. The patient reports:  taking medications as instructed, no medication side effects noted, no TIA's, no chest pain on exertion, no dyspnea on exertion, no swelling of ankles. BP Readings from Last 3 Encounters:   20 120/76   19 126/76   19 136/83      Thyroid Disease Follow up:  Currently takes Levothyroxine 150 mcg. Thyroid medication has been has not been changed since last medication check and labs. Patient denies fatigue, nervousness, weight changes, heat or cold intolerance, bowel changes, skin changes, cardiovascular symptoms, hair loss, feeling excessive energy, tremor, palpitations, and/or weight loss. Lab Results   Component Value Date/Time    TSH 1.39 2019 08:42 AM    T4, Free 1.1 2020 08:30 AM    T4, Total 9.6 11/10/2010 01:21 AM     Hypertriglyceridemia Follow up:   Cardiovascular risks for him are: hypertension  hyperlipidemia.    Currently he takes Crestor, 10 mg MWF, fish oil   Myalgias: NO   Fatigue: NO   Other side effects: NO     Wt Readings from Last 3 Encounters:   20 213 lb (96.6 kg)   19 215 lb (97.5 kg)   19 214 lb (97.1 kg)     COPD:  Possible COPD component Pulmicort controls symptoms well. Patient has Albuterol as needed as well but is not taking. Takes Flonase as well. 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 Currently taking       Review of Systems   Constitutional: Negative for chills and fever. HENT: Negative for congestion. Eyes: Negative for blurred vision and double vision. Respiratory: Negative for cough. Cardiovascular: Negative for chest pain. Gastrointestinal: Negative for abdominal pain, nausea and vomiting. Limited Exam:  Due to this being a TeleHealth evaluation, many elements of the physical examination are unable to be assessed. Constitutional: Appears in no apparent distress   Mental status: Alert and awake, Oriented to person/place/time, Able to follow commands  Psychiatric: Normal affect, normal judgment/insight. No hallucinations     Current Outpatient Medications on File Prior to Visit   Medication Sig Dispense Refill    amLODIPine (NORVASC) 2.5 mg tablet TAKE ONE TABLET BY MOUTH EVERY DAY 30 Tab 5    levothyroxine (Synthroid) 150 mcg tablet TAKE 1 TABLET BY MOUTH DAILY BEFORE BREAKFAST 30 Tab 5    fluticasone propionate (FLONASE) 50 mcg/actuation nasal spray USE 2 SPRAYS IN EACH NOSTRIL DAILY 16 g 5    rosuvastatin (CRESTOR) 10 mg tablet Take 1 nightly on Monday Wednesday and Friday of each week. 36 Tab 3    budesonide (PULMICORT) 180 mcg/actuation aepb inhaler Take 1 Puff by inhalation two (2) times a day. 1 Inhaler 5    varicella-zoster (SHINGRIX) injection Shingrix, one injection now and repeat in 4-6 months. To be administered at Pharmacy. Fax confirmation to me at [phone]. Indications: Prevention of Shingles 2 Each 0    sildenafil citrate (VIAGRA) 50 mg tablet Take 1 Tab by mouth as needed. 20 minutes before relations. Max dose once daily.  10 Tab 4    albuterol (PROVENTIL HFA, VENTOLIN HFA, PROAIR HFA) 90 mcg/actuation inhaler Take 1 Puff by inhalation every four (4) hours as needed for Wheezing. 1 Inhaler 5    aspirin (ASPIRIN) 325 mg tablet Take 1 Tab by mouth daily.  magnesium oxide (MAG-OX) 400 mg tablet Take 400 mg by mouth daily.  senna (SENNA) 8.6 mg tablet Take 1 Tab by mouth daily.  cyanocobalamin (VITAMIN B-12) 1,000 mcg tablet Take 1,000 mcg by mouth daily.  omega-3 fatty acids-vitamin e (FISH OIL) 1,000 mg Cap Take 1 Cap by mouth two (2) times a day.  [CANCELED] magnesium oxide (MAG-OXIDE) 400 mg tablet Take 1 Tab by mouth daily. 90 Tab 3     No current facility-administered medications on file prior to visit. Allergies   Allergen Reactions    Lipitor [Atorvastatin] Other (comments)     Other reaction(s): Cramps  Myalgia      Simvastatin Other (comments)     Other reaction(s): Cramps  myalgia          Patient Active Problem List    Diagnosis Date Noted    Chronic midline low back pain with left-sided sciatica 09/30/2020    Poor historian 07/12/2018    Spinal stenosis of cervical region 04/20/2018    Pain 04/20/2018    Shoulder-hand syndrome 04/20/2018    Weakness 04/20/2018    History of CVA (cerebrovascular accident) 05/22/2015    C[facility]brovascular accident ([facility]A) ([facility] 02/01/2015    Hypertension 11/11/2014    Prostate cancer ([facility] 04/17/2011    Hypothyroidism 09/25/2010    Thyroglossal duct cyst 09/20/2010    Mixed hyperlipidemia 04/27/2010    Benign neoplasm of colon 04/27/2010    Keloid 04/27/2010    Internal hemorrhoid 04/27/2010        Health Maintenance Due   Topic Date Due    Shingrix Vaccine Age 50> (1 of 2) 03/18/2002    AAA Screening 73-67 YO Male Smoking Patients  03/18/2017    GLAUCOMA SCREENING Q2Y  04/24/2020    Pneumococcal 65+ years (2 of 2 - PPSV23) 09/01/2020    Pneumococcal 65+ yrs at Risk Vaccine (2 of 2 - PPSV23) 09/01/2020        Assessment/Plan:  Details of this discussion including any medical advice provided: Labs today     ICD-10-CM ICD-9-CM    1.  Essential hypertension  I10 401.9 METABOLIC PANEL, COMPREHENSIVE      HEMOGLOBIN   2. Spinal stenosis of cervical region  M48.02 723.0    3. Hypothyroidism due to acquired atrophy of thyroid  E03.4 244.8 TSH 3RD GENERATION     246.8    4. Mixed hyperlipidemia  E78.2 272.2 LIPID PANEL   5. B12 deficiency  E53.8 266.2 VITAMIN B12     Follow-up and Dispositions    · Return in about 3 months (around 12/30/2020) for Follow up Chronic issues. Total Time: minutes: 11-20 minutes was spent on telemedicine encounter discussing above problems and plans. Patient Problem list, medications, and Allergies were reviewed during this encounter. Pursuant to the emergency declaration under the [address] [place] [address], [facility] waiver authority and the [employer] Act, this Telephone Visit was conducted, with patient's consent, to reduce the patient's risk of exposure to COVID-19 and provide continuity of care for an established patient. I affirm this is a Patient Initiated Episode with an Established Patient who has not had a related appointment within my department in the past 7 days or scheduled within the next 24 hours. Discussed diagnoses in detail with patient. Medication risks/benefits/side effects discussed with patient. All of the patient's questions were addressed. The patient understands and agrees with our plan of care. The patient knows to call back if they are unsure of or forget any changes we discussed today or if the symptoms change.     Note: not billable if this call serves to triage the patient into an appointment for the relevant concern    MD GENNY Patiño & ROSALINDA LAWS [facility]  09/30/20

## 2020-09-30 NOTE — PROGRESS NOTES
Chief Complaint   Patient presents with    Follow-up     1. Have you been to the ER, urgent care clinic since your last visit? Hospitalized since your last visit? No    2. Have you seen or consulted any other health care providers outside of the [address] [place] [address] since your last visit? Include any pap smears or colon screening.  No    Reviewed record in preparation for visit and have necessary documentation  Pt did not bring medication to office visit for review  opportunity was given for questions  Goals that were addressed and/or need to be completed during or after this appointment include   Health Maintenance Due   Topic Date Due    Shingrix Vaccine Age 49> (1 of 2) 03/18/2002    AAA Screening 73-69 YO Male Smoking Patients  03/18/2017    GLAUCOMA SCREENING Q2Y  04/24/2020    Pneumococcal 65+ years (2 of 2 - PPSV23) 09/01/2020    Pneumococcal 65+ yrs at Risk Vaccine (2 of 2 - PPSV23) 09/01/2020

## 2020-10-02 ENCOUNTER — LAB ONLY (OUTPATIENT)
Dept: FAMILY MEDICINE CLINIC | Age: 70
End: 2020-10-02

## 2020-10-02 DIAGNOSIS — E78.2 MIXED HYPERLIPIDEMIA: Chronic | ICD-10-CM

## 2020-10-02 DIAGNOSIS — E03.4 HYPOTHYROIDISM DUE TO ACQUIRED ATROPHY OF THYROID: Chronic | ICD-10-CM

## 2020-10-02 DIAGNOSIS — E53.8 B12 DEFICIENCY: ICD-10-CM

## 2020-10-02 DIAGNOSIS — I10 ESSENTIAL HYPERTENSION: Chronic | ICD-10-CM

## 2020-10-02 LAB
ALBUMIN SERPL-MCNC: 4.2 G/DL (ref 3.5–5)
ALBUMIN/GLOB SERPL: 1.1 {RATIO} (ref 1.1–2.2)
ALP SERPL-CCNC: 66 U/L (ref 45–117)
ALT SERPL-CCNC: 28 U/L (ref 12–78)
ANION GAP SERPL CALC-SCNC: 5 MMOL/L (ref 5–15)
AST SERPL-CCNC: 28 U/L (ref 15–37)
BILIRUB SERPL-MCNC: 1 MG/DL (ref 0.2–1)
BUN SERPL-MCNC: 15 MG/DL (ref 6–20)
BUN/CREAT SERPL: 17 (ref 12–20)
CALCIUM SERPL-MCNC: 9.9 MG/DL (ref 8.5–10.1)
CHLORIDE SERPL-SCNC: 112 MMOL/L (ref 97–108)
CHOLEST SERPL-MCNC: 175 MG/DL
CO2 SERPL-SCNC: 25 MMOL/L (ref 21–32)
CREAT SERPL-MCNC: 0.9 MG/DL (ref 0.7–1.3)
GLOBULIN SER CALC-MCNC: 3.8 G/DL (ref 2–4)
GLUCOSE SERPL-MCNC: 94 MG/DL (ref 65–100)
HDLC SERPL-MCNC: 68 MG/DL
HDLC SERPL: 2.6 {RATIO} (ref 0–5)
HGB BLD-MCNC: 13 G/DL (ref 12.1–17)
LDLC SERPL CALC-MCNC: 94.4 MG/DL (ref 0–100)
LIPID PROFILE,FLP: NORMAL
POTASSIUM SERPL-SCNC: 4.2 MMOL/L (ref 3.5–5.1)
PROT SERPL-MCNC: 8 G/DL (ref 6.4–8.2)
SODIUM SERPL-SCNC: 142 MMOL/L (ref 136–145)
TRIGL SERPL-MCNC: 63 MG/DL (ref ?–150)
TSH SERPL DL<=0.05 MIU/L-ACNC: 0.9 UIU/ML (ref 0.36–3.74)
VIT B12 SERPL-MCNC: 1384 PG/ML (ref 193–986)
VLDLC SERPL CALC-MCNC: 12.6 MG/DL

## 2020-10-14 ENCOUNTER — TELEPHONE (OUTPATIENT)
Dept: FAMILY MEDICINE CLINIC | Age: 70
End: 2020-10-14

## 2020-10-14 NOTE — TELEPHONE ENCOUNTER
Purchased Vitamin D capsules and wants to know if it safe to take. States it said to consult with physician before taking. Please call patient.   [phone]

## 2020-10-15 NOTE — TELEPHONE ENCOUNTER
Patient called per Dr. Ahuja Dress: It is safe for him to take vitamin D capsules    Patient verbalized understanding and appreciative.

## 2021-03-15 ENCOUNTER — OFFICE VISIT (OUTPATIENT)
Dept: FAMILY MEDICINE CLINIC | Age: 71
End: 2021-03-15
Payer: MEDICARE

## 2021-03-15 VITALS
OXYGEN SATURATION: 98 % | RESPIRATION RATE: 16 BRPM | HEART RATE: 55 BPM | SYSTOLIC BLOOD PRESSURE: 146 MMHG | DIASTOLIC BLOOD PRESSURE: 86 MMHG | HEIGHT: 71 IN | BODY MASS INDEX: 29.68 KG/M2 | WEIGHT: 212 LBS | TEMPERATURE: 96.4 F

## 2021-03-15 DIAGNOSIS — G89.29 CHRONIC MIDLINE LOW BACK PAIN WITH LEFT-SIDED SCIATICA: Primary | Chronic | ICD-10-CM

## 2021-03-15 DIAGNOSIS — J41.0 SIMPLE CHRONIC BRONCHITIS (HCC): ICD-10-CM

## 2021-03-15 DIAGNOSIS — I10 ESSENTIAL HYPERTENSION: Chronic | ICD-10-CM

## 2021-03-15 DIAGNOSIS — M54.42 CHRONIC MIDLINE LOW BACK PAIN WITH LEFT-SIDED SCIATICA: Primary | Chronic | ICD-10-CM

## 2021-03-15 DIAGNOSIS — E78.2 MIXED HYPERLIPIDEMIA: Chronic | ICD-10-CM

## 2021-03-15 DIAGNOSIS — E03.4 HYPOTHYROIDISM DUE TO ACQUIRED ATROPHY OF THYROID: Chronic | ICD-10-CM

## 2021-03-15 PROCEDURE — 1101F PT FALLS ASSESS-DOCD LE1/YR: CPT | Performed by: FAMILY MEDICINE

## 2021-03-15 PROCEDURE — G8753 SYS BP > OR = 140: HCPCS | Performed by: FAMILY MEDICINE

## 2021-03-15 PROCEDURE — G8510 SCR DEP NEG, NO PLAN REQD: HCPCS | Performed by: FAMILY MEDICINE

## 2021-03-15 PROCEDURE — G8754 DIAS BP LESS 90: HCPCS | Performed by: FAMILY MEDICINE

## 2021-03-15 PROCEDURE — G8427 DOCREV CUR MEDS BY ELIG CLIN: HCPCS | Performed by: FAMILY MEDICINE

## 2021-03-15 PROCEDURE — G8419 CALC BMI OUT NRM PARAM NOF/U: HCPCS | Performed by: FAMILY MEDICINE

## 2021-03-15 PROCEDURE — 99214 OFFICE O/P EST MOD 30 MIN: CPT | Performed by: FAMILY MEDICINE

## 2021-03-15 PROCEDURE — G8536 NO DOC ELDER MAL SCRN: HCPCS | Performed by: FAMILY MEDICINE

## 2021-03-15 PROCEDURE — 3017F COLORECTAL CA SCREEN DOC REV: CPT | Performed by: FAMILY MEDICINE

## 2021-03-15 RX ORDER — ROSUVASTATIN CALCIUM 10 MG/1
TABLET, COATED ORAL
Qty: 36 TAB | Refills: 1 | Status: SHIPPED | OUTPATIENT
Start: 2021-03-15 | End: 2021-10-14

## 2021-03-15 RX ORDER — LEVOTHYROXINE SODIUM 150 MCG
TABLET ORAL
Qty: 90 TAB | Refills: 1 | Status: SHIPPED | OUTPATIENT
Start: 2021-03-15 | End: 2021-07-21

## 2021-03-15 RX ORDER — DICLOFENAC SODIUM 10 MG/G
1 GEL TOPICAL 4 TIMES DAILY
Qty: 350 G | Refills: 1 | Status: SHIPPED | OUTPATIENT
Start: 2021-03-15 | End: 2021-07-22

## 2021-03-15 RX ORDER — AMLODIPINE BESYLATE 2.5 MG/1
2.5 TABLET ORAL DAILY
Qty: 90 TAB | Refills: 1 | Status: SHIPPED | OUTPATIENT
Start: 2021-03-15 | End: 2021-12-08 | Stop reason: SDUPTHER

## 2021-03-15 NOTE — PROGRESS NOTES
1. Have you been to the ER, urgent care clinic, or been hospitalized since your last visit?  no    2. Have you seen or consulted any other health care providers outside of the [address] [place] [address] since your last visit?  no    Reviewed record in preparation for visit and have necessary documentation  Goals that were addressed and/or need to be completed during or after this appointment include   Health Maintenance Due   Topic Date Due    COVID-19 Vaccine (1) Never done    Shingrix Vaccine Age 50> (1 of 2) Never done    AAA Screening 73-69 YO Male Smoking Patients  Never done    GLAUCOMA SCREENING Q2Y  04/24/2020    Pneumococcal 65+ years (2 of 2 - PPSV23) 09/01/2020    Pneumococcal 65+ yrs at Risk Vaccine (2 of 2 - PPSV23) 09/01/2020    Medicare Yearly Exam  11/22/2020       I have received verbal consent from Shanna Alfred to discuss any/all medical information while others present in the room.

## 2021-03-15 NOTE — PROGRESS NOTES
[facility]    History of Present Illness:   Gali Maria is a 78 y.o. male with history of HTN, CVA, Hypothyroidism, DDD, Prostate Cancer  CC: Follow up  History provided by patient and Records    HPI:  DDD: Persistent low back pains, chronic issue and had injections in the back about 10 years ago. Worsened with standing, improved with leaning over/sitting. Not taking any medications at this time. Hypertension Follow up:  Currently Taking Amlodipine 2.5 mg. The patient reports:  taking medications as instructed, no medication side effects noted, no TIA's, no chest pain on exertion, no dyspnea on exertion, no swelling of ankles. BP Readings from Last 3 Encounters:   03/15/21 (!) 146/86   02/28/20 120/76   11/22/19 126/76      Thyroid Disease Follow up:  Currently takes Levothyroxine 150 mcg. Thyroid medication has been has not been changed since last medication check and labs. Patient denies fatigue, nervousness, weight changes, heat or cold intolerance, bowel changes, skin changes, cardiovascular symptoms, hair loss, feeling excessive energy, tremor, palpitations, and/or weight loss. Lab Results   Component Value Date/Time    TSH 0.90 10/02/2020 09:31 AM    T4, Free 1.1 02/28/2020 08:30 AM    T4, Total 9.6 11/10/2010 01:21 AM     Hypertriglyceridemia Follow up:   Cardiovascular risks for him are: hypertension  hyperlipidemia. Currently he takes Crestor , 10 mg MWF, Fish oil   Myalgias: NO   Fatigue: NO   Other side effects: NO     Wt Readings from Last 3 Encounters:   03/15/21 212 lb (96.2 kg)   02/28/20 213 lb (96.6 kg)   11/22/19 215 lb (97.5 kg)      COPD:  Possible COPD component Pulmicort controls symptoms well. Patient has Albuterol as needed as well but is not taking. Takes Flonase as well. 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 Managed by Urology.     Health Maintenance  Health Maintenance Due   Topic Date Due    COVID-19 Vaccine (1) Never done    Shingrix Vaccine Age 50> (1 of 2) Never done    AAA Screening 73-67 YO Male Smoking Patients  Never done    GLAUCOMA SCREENING Q2Y  04/24/2020    Pneumococcal 65+ years (2 of 2 - PPSV23) 09/01/2020    Pneumococcal 65+ yrs at Risk Vaccine (2 of 2 - PPSV23) 09/01/2020    Medicare Yearly Exam  11/22/2020       Past Medical, Family, and Social History:     Current Outpatient Medications on File Prior to Visit   Medication Sig Dispense Refill    fluticasone propionate (FLONASE) 50 mcg/actuation nasal spray USE 2 SPRAYS IN EACH NOSTRIL DAILY 16 g 5    budesonide (PULMICORT) 180 mcg/actuation aepb inhaler Take 1 Puff by inhalation two (2) times a day. 1 Inhaler 5    albuterol (PROVENTIL HFA, VENTOLIN HFA, PROAIR HFA) 90 mcg/actuation inhaler Take 1 Puff by inhalation every four (4) hours as needed for Wheezing. 1 Inhaler 5    aspirin (ASPIRIN) 325 mg tablet Take 1 Tab by mouth daily.  senna (SENNA) 8.6 mg tablet Take 1 Tab by mouth daily.  cyanocobalamin (VITAMIN B-12) 1,000 mcg tablet Take 1,000 mcg by mouth daily.  omega-3 fatty acids-vitamin e (FISH OIL) 1,000 mg Cap Take 1 Cap by mouth two (2) times a day.  [DISCONTINUED] Synthroid 150 mcg tablet TAKE 1 TABLET BY MOUTH DAILY BEFORE BREAKFAST Appt Needed for refills 90 Tab 0    [DISCONTINUED] rosuvastatin (CRESTOR) 10 mg tablet TAKE ONE TABLET BY MOUTH NIGHTLY ON MONDAY, WEDNESDAY AND FRIDAY OF EACH WEEK 36 Tab 1    [DISCONTINUED] amLODIPine (NORVASC) 2.5 mg tablet TAKE ONE TABLET BY MOUTH EVERY DAY 90 Tab 1    varicella-zoster (SHINGRIX) injection Shingrix, one injection now and repeat in 4-6 months. To be administered at Pharmacy. Fax confirmation to me at [phone]. Indications: Prevention of Shingles 2 Each 0    sildenafil citrate (VIAGRA) 50 mg tablet Take 1 Tab by mouth as needed. 20 minutes before relations. Max dose once daily. 10 Tab 4    magnesium oxide (MAG-OX) 400 mg tablet Take 400 mg by mouth daily.       [CANCELED] magnesium oxide (MAG-OXIDE) 400 mg tablet Take 1 Tab by mouth daily. 90 Tab 3     No current facility-administered medications on file prior to visit. 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    Cerebrovascular accident (CVA) ([facility] [address] I63.9    Weakness R53.1    Poor historian Z78.9    Chronic midline low back pain with left-sided sciatica M54.42, G89.29       Social History     Socioeconomic History    Marital status:      Spouse name: Not on file    Number of children: Not on file    Years of education: Not on file    Highest education level: Not on file   Occupational History    Not on file   Social Needs    Financial resource strain: Not on file    Food insecurity     Worry: Not on file     Inability: Not on file    Transportation needs     Medical: Not on file     Non-medical: Not on file   Tobacco Use    Smoking status: Former Smoker     Packs/day: 0.25     Years: 40.00     Pack years: 10.00     Types: Cigarettes     Quit date: 2015     Years since quittin.0    Smokeless tobacco: Never Used    Tobacco comment: smokes 1/2 pack week   Substance and Sexual Activity    Alcohol use:  Yes     Alcohol/week: 5.0 standard drinks     Types: 6 Cans of beer per week     Comment: moderately    Drug use: No    Sexual activity: Yes   Lifestyle    Physical activity     Days per week: Not on file     Minutes per session: Not on file    Stress: Not on file   Relationships    Social connections     Talks on phone: Not on file     Gets together: Not on file     Attends [employer] service: Not on file     Active member of club or organization: Not on file     Attends meetings of clubs or organizations: Not on file     Relationship status: Not on file    Intimate partner violence     Fear of current or ex partner: Not on file     Emotionally abused: Not on file     Physically abused: Not on file     Forced sexual activity: Not on file   Other Topics Concern    Not on file   Social History Narrative    Not on file       Review of Systems   Review of Systems   Constitutional: Negative for chills and fever. HENT: Negative for congestion. Respiratory: Negative for cough, shortness of breath and wheezing. Cardiovascular: Negative for chest pain and palpitations. Gastrointestinal: Negative for abdominal pain, nausea and vomiting. Musculoskeletal: Positive for back pain. Negative for myalgias and neck pain. Objective:     Visit Vitals  BP (!) 146/86   Pulse (!) 55   Temp (!) 96.4 °F (35.8 °C) (Temporal)   Resp 16   Ht 5' 11\" (1.803 m)   Wt 212 lb (96.2 kg)   SpO2 98%   BMI 29.57 kg/m²        Physical Exam  Vitals signs and nursing note reviewed. Constitutional:       Appearance: Normal appearance. HENT:      Head: Normocephalic and atraumatic. Neck:      Musculoskeletal: Normal range of motion and neck supple. Cardiovascular:      Rate and Rhythm: Normal rate and regular rhythm. Pulses: Normal pulses. Heart sounds: Normal heart sounds. No murmur. No friction rub. No gallop. Pulmonary:      Effort: Pulmonary effort is normal.      Breath sounds: Normal breath sounds. Abdominal:      General: Abdomen is flat. Bowel sounds are normal.      Palpations: Abdomen is soft. Musculoskeletal: Normal range of motion. Neurological:      Mental Status: He is alert. Pertinent Labs/Studies:      Assessment and orders:       ICD-10-CM ICD-9-CM    1. Chronic midline low back pain with left-sided sciatica  M54.42 724.2 diclofenac (VOLTAREN) 1 % gel    G89.29 724.3      338.29    2.  Hypothyroidism due to acquired atrophy of thyroid  E03.4 244.8 Synthroid 150 mcg tablet     246.8 TSH 3RD GENERATION      T4, FREE   3. Essential hypertension  I10 401.9 amLODIPine (NORVASC) 2.5 mg tablet      CBC W/O DIFF      METABOLIC PANEL, COMPREHENSIVE   4. Mixed hyperlipidemia  E78.2 272.2 rosuvastatin (CRESTOR) 10 mg tablet      LIPID PANEL   5. Simple chronic bronchitis ([address]  J41.0 491.0      Diagnoses and all orders for this visit:    1. Chronic midline low back pain with left-sided sciatica:Trial of Diclofenac now, consider optehr orals as well, though may benefit from repeat back injection. -     diclofenac (VOLTAREN) 1 % gel; Apply 1 g to affected area four (4) times daily. Apply to low back    2. Hypothyroidism due to acquired atrophy of thyroid: Condition and symptoms are well controlled at this time. No changes to therapy at this time. -     Synthroid 150 mcg tablet; TAKE 1 TABLET BY MOUTH DAILY BEFORE BREAKFAST  -     TSH 3RD GENERATION; Future  -     T4, FREE; Future    3. Essential hypertension: Our goal is to normalize the blood pressure to decrease the risks of strokes and heart attacks. The patient is in agreement with the plan. Condition and symptoms are well controlled at this time. No changes to therapy at this time. -     amLODIPine (NORVASC) 2.5 mg tablet; Take 1 Tab by mouth daily.  -     CBC W/O DIFF; Future  -     METABOLIC PANEL, COMPREHENSIVE; Future    4. Mixed hyperlipidemia: The patient is aware of our goal to reduce or eliminate the long term problems (such as strokes and heart attacks) related to poorly controlled Triglycerides, LDL, Cholesterol.   -     rosuvastatin (CRESTOR) 10 mg tablet; TAKE ONE TABLET BY MOUTH NIGHTLY ON MONDAY, WEDNESDAY AND FRIDAY OF EACH WEEK  -     LIPID PANEL; Future    5. Simple chronic bronchitis ([facility] [address]): Condition and symptoms are well controlled at this time. No changes to therapy at this time.        Follow-up and Dispositions    · Return in about 2 weeks (around 3/29/2021) for Follow up for MWE and Back pain. I have discussed the diagnosis with the patient and the intended plan as seen in the above orders. Social history, medical history, and labs were reviewed. The patient has received an after-visit summary and questions were answered concerning future plans. I have discussed medication side effects and warnings with the patient as well.     MD GENNY Chacon & ROSALINDA LAWS [facility]  03/15/21

## 2021-03-16 LAB
ALBUMIN SERPL-MCNC: 3.9 G/DL (ref 3.5–5)
ALBUMIN/GLOB SERPL: 1 {RATIO} (ref 1.1–2.2)
ALP SERPL-CCNC: 74 U/L (ref 45–117)
ALT SERPL-CCNC: 26 U/L (ref 12–78)
ANION GAP SERPL CALC-SCNC: 4 MMOL/L (ref 5–15)
AST SERPL-CCNC: 26 U/L (ref 15–37)
BILIRUB SERPL-MCNC: 0.8 MG/DL (ref 0.2–1)
BUN SERPL-MCNC: 11 MG/DL (ref 6–20)
BUN/CREAT SERPL: 12 (ref 12–20)
CALCIUM SERPL-MCNC: 9.8 MG/DL (ref 8.5–10.1)
CHLORIDE SERPL-SCNC: 110 MMOL/L (ref 97–108)
CHOLEST SERPL-MCNC: 165 MG/DL
CO2 SERPL-SCNC: 24 MMOL/L (ref 21–32)
CREAT SERPL-MCNC: 0.89 MG/DL (ref 0.7–1.3)
ERYTHROCYTE [DISTWIDTH] IN BLOOD BY AUTOMATED COUNT: 13.9 % (ref 11.5–14.5)
GLOBULIN SER CALC-MCNC: 4 G/DL (ref 2–4)
GLUCOSE SERPL-MCNC: 95 MG/DL (ref 65–100)
HCT VFR BLD AUTO: 39.8 % (ref 36.6–50.3)
HDLC SERPL-MCNC: 66 MG/DL
HDLC SERPL: 2.5 {RATIO} (ref 0–5)
HGB BLD-MCNC: 12.6 G/DL (ref 12.1–17)
LDLC SERPL CALC-MCNC: 84 MG/DL (ref 0–100)
LIPID PROFILE,FLP: NORMAL
MCH RBC QN AUTO: 29.6 PG (ref 26–34)
MCHC RBC AUTO-ENTMCNC: 31.7 G/DL (ref 30–36.5)
MCV RBC AUTO: 93.6 FL (ref 80–99)
NRBC # BLD: 0 K/UL (ref 0–0.01)
NRBC BLD-RTO: 0 PER 100 WBC
PLATELET # BLD AUTO: 245 K/UL (ref 150–400)
PMV BLD AUTO: 11.4 FL (ref 8.9–12.9)
POTASSIUM SERPL-SCNC: 4.4 MMOL/L (ref 3.5–5.1)
PROT SERPL-MCNC: 7.9 G/DL (ref 6.4–8.2)
RBC # BLD AUTO: 4.25 M/UL (ref 4.1–5.7)
SODIUM SERPL-SCNC: 138 MMOL/L (ref 136–145)
T4 FREE SERPL-MCNC: 1.3 NG/DL (ref 0.8–1.5)
TRIGL SERPL-MCNC: 75 MG/DL (ref ?–150)
TSH SERPL DL<=0.05 MIU/L-ACNC: 0.84 UIU/ML (ref 0.36–3.74)
VLDLC SERPL CALC-MCNC: 15 MG/DL
WBC # BLD AUTO: 4.9 K/UL (ref 4.1–11.1)

## 2021-03-29 ENCOUNTER — OFFICE VISIT (OUTPATIENT)
Dept: FAMILY MEDICINE CLINIC | Age: 71
End: 2021-03-29
Payer: MEDICARE

## 2021-03-29 VITALS
DIASTOLIC BLOOD PRESSURE: 89 MMHG | HEART RATE: 55 BPM | SYSTOLIC BLOOD PRESSURE: 150 MMHG | WEIGHT: 210.4 LBS | RESPIRATION RATE: 18 BRPM | HEIGHT: 71 IN | BODY MASS INDEX: 29.46 KG/M2 | TEMPERATURE: 97.9 F | OXYGEN SATURATION: 100 %

## 2021-03-29 DIAGNOSIS — Z00.00 MEDICARE ANNUAL WELLNESS VISIT, SUBSEQUENT: Primary | ICD-10-CM

## 2021-03-29 PROCEDURE — G0439 PPPS, SUBSEQ VISIT: HCPCS | Performed by: FAMILY MEDICINE

## 2021-03-29 PROCEDURE — G8427 DOCREV CUR MEDS BY ELIG CLIN: HCPCS | Performed by: FAMILY MEDICINE

## 2021-03-29 PROCEDURE — 3017F COLORECTAL CA SCREEN DOC REV: CPT | Performed by: FAMILY MEDICINE

## 2021-03-29 PROCEDURE — G8753 SYS BP > OR = 140: HCPCS | Performed by: FAMILY MEDICINE

## 2021-03-29 PROCEDURE — G8754 DIAS BP LESS 90: HCPCS | Performed by: FAMILY MEDICINE

## 2021-03-29 PROCEDURE — G8536 NO DOC ELDER MAL SCRN: HCPCS | Performed by: FAMILY MEDICINE

## 2021-03-29 PROCEDURE — G8419 CALC BMI OUT NRM PARAM NOF/U: HCPCS | Performed by: FAMILY MEDICINE

## 2021-03-29 PROCEDURE — 1101F PT FALLS ASSESS-DOCD LE1/YR: CPT | Performed by: FAMILY MEDICINE

## 2021-03-29 PROCEDURE — G8510 SCR DEP NEG, NO PLAN REQD: HCPCS | Performed by: FAMILY MEDICINE

## 2021-03-29 NOTE — PROGRESS NOTES
Chief Complaint   Patient presents with   [facility] Annual Wellness Visit     Visit Vitals  BP (!) 150/92 (BP 1 Location: Right arm, BP Patient Position: Sitting, BP Cuff Size: Adult)   Pulse (!) 55   Temp 97.9 °F (36.6 °C) (Temporal)   Resp 18   Ht 5' 11\" (1.803 m)   Wt 210 lb 6.4 oz (95.4 kg)   SpO2 100%   BMI 29.34 kg/m²     1. Have you been to the ER, urgent care clinic since your last visit? Hospitalized since your last visit? No    2. Have you seen or consulted any other health care providers outside of the [address] [place] [address] since your last visit? Include any pap smears or colon screening.  No    Reviewed record in preparation for visit and have necessary documentation  Pt did not bring medication to office visit for review  opportunity was given for questions  Goals that were addressed and/or need to be completed during or after this appointment include   Health Maintenance Due   Topic Date Due    COVID-19 Vaccine (1) Never done    Shingrix Vaccine Age 50> (1 of 2) Never done    AAA Screening 73-69 YO Male Smoking Patients  Never done    Pneumococcal 65+ years (2 of 2 - PPSV23) 09/01/2020    Pneumococcal 65+ yrs at Risk Vaccine (2 of 2 - PPSV23) 09/01/2020    Medicare Yearly Exam  11/22/2020

## 2021-03-29 NOTE — PROGRESS NOTES
This is the Subsequent Medicare Annual Wellness Exam, performed 12 months or more after the Initial AWV or the last Subsequent AWV    I have reviewed the patient's medical history in detail and updated the computerized patient record. History     Well Male exam:  Demetrio Rojas is a 73 y.o. Male presenting for well male exam.     How would you rate your health in generally over the last year? Good  Has your physical and emotional health limited your social activities with family or friends? no    Current Complaints? Back Pain is improving    Pertinent past medical history: HTN, Hypothyroidism, Prostate Cancer. Sexual History:  Sexually active: Yes  Number of sexual partners: 1  Type of sexual partners: Wife  Method of family planning: None required    Last Digital Rectal exam: Sees Urologist in July    Diet/Exercise History:  Diet: Favorite food Chicken Sandwhiches. - Does patient eat at least 5 servings of Fruits/vegetables daily? No   - Is patient currently dieting? No    Exercise: Patient does not have structured exercise  - Does patient get 150 minutes of structured exercise weekly? No  - Type of work patient has? retired  - Limitations to exercise? None    Healthcare maintenance:   Health Maintenance Due   Topic Date Due    COVID-19 Vaccine (1) Never done    Shingrix Vaccine Age 50> (3 of 2) Never done    AAA Screening 73-69 YO Male Smoking Patients  Never done    Pneumococcal 65+ years (2 of 2 - PPSV23) 09/01/2020    Pneumococcal 65+ yrs at Risk Vaccine (2 of 2 - PPSV23) 09/01/2020     Colonoscopy indicated? No   DEXA scan indicated? No   HIV/STI testing indicated? No   Hepatitis C testing indicated? No   Lung cancer screening indicated? No   AAA screening indicated?   Yes    Immunization History   Administered Date(s) Administered    Influenza High Dose Vaccine PF 10/11/2017    Influenza Vaccine 10/23/2013    Influenza Vaccine (Quad) PF (>6 Mo Flulaval, Fluarix, and >3 Yrs Afluria, Fluzone 18210) 10/16/2015, 09/21/2016    Influenza Vaccine (Tri) Adjuvanted (>65 Yrs FLUAD TRI 06348) 10/01/2018, 10/04/2019, 09/02/2020    Influenza Vaccine Split 12/03/2009, 11/10/2010, 10/21/2011, 10/26/2012    Pneumococcal Conjugate (PCV-13) 11/30/2016    Pneumococcal Polysaccharide (PPSV-23) 09/01/2015    Tdap 11/11/2014     Flu indicated? No   Tdap indicated? No   Pneumovax indicated? Yes  Zostervax indicated? Yes  Meningococcal indicated?  No      Past Medical History:   Diagnosis Date    Benign neoplasm of colon 4/27/2010    Cancer ([facility] [address]     PROSTATE    HTN (hypertension) 11/11/2014    Hypercholesterolemia     Internal hemorrhoid 4/27/2010    Keloid 4/27/2010    Mixed hyperlipidemia 4/27/2010    Neck mass 4/27/2010    Other ill-defined conditions(799.89)     HI CHOLESTEROL    Prostate cancer ([facility] [address] 4/17/2011    Shoulder-hand syndrome     Smoker     Stroke [facility])     Per pt 2014    Thyroid disease     HYPO    Tobacco use disorder 4/27/2010      Past Surgical History:   Procedure Laterality Date    HX CERVICAL FUSION      HX GI      COLONOSCOPY    HX OTHER SURGICAL      KELOIDS--BACK    HX PROSTATECTOMY      PROSTATE BIOPSY    HX SPLENECTOMY  1969    adhesions----1997     Allergies   Allergen Reactions    Lipitor [Atorvastatin] Other (comments)     Other reaction(s): Cramps  Myalgia      Simvastatin Other (comments)     Other reaction(s): Cramps  myalgia       Family History   Problem Relation Age of Onset    Diabetes Father     Asthma Sister     Alcohol abuse Neg Hx     Arthritis-rheumatoid Neg Hx     Bleeding Prob Neg Hx     Cancer Neg Hx     Elevated Lipids Neg Hx     Headache Neg Hx     Heart Disease Neg Hx     Hypertension Neg Hx     Lung Disease Neg Hx     Migraines Neg Hx     Psychiatric Disorder Neg Hx     Stroke Neg Hx     Mental Retardation Neg Hx      Social History     Tobacco Use    Smoking status: Former Smoker     Packs/day: 0.25     Years: 40.00     Pack years: 10.00     Types: Cigarettes     Quit date: 2015     Years since quittin.1    Smokeless tobacco: Never Used    Tobacco comment: smokes 1/2 pack week   Substance Use Topics    Alcohol use: Yes     Alcohol/week: 5.0 standard drinks     Types: 6 Cans of beer per week     Comment: moderately     Depression Risk Factor Screening:     3 most recent PHQ Screens 3/29/2021   Little interest or pleasure in doing things Not at all   Feeling down, depressed, irritable, or hopeless Not at all   Total Score PHQ 2 0     Alcohol Risk Factor Screening:    Do you average more than 1 drink per night or more than 7 drinks a week: No    In the past three months have you have had more than 4 drinks containing alcohol on one occasion: No  Functional Ability and Level of Safety:   Hearing Loss  Hearing is good. Activities of Daily Living  The home contains: no safety equipment. Patient does total self care  ADL Assessment 3/29/2021   Feeding yourself No Help Needed   Getting from bed to chair No Help Needed   Getting dressed No Help Needed   Bathing or showering No Help Needed   Walk across the room (includes cane/walker) No Help Needed   Using the telphone No Help Needed   Taking your medications No Help Needed   Preparing meals No Help Needed   Managing money (expenses/bills) No Help Needed   Moderately strenuous housework (laundry) No Help Needed   Shopping for personal items (toiletries/medicines) No Help Needed   Shopping for groceries No Help Needed   Driving No Help Needed   Climbing a flight of stairs No Help Needed   Getting to places beyond walking distances No Help Needed       Ambulation: with no difficulty    Fall Risk  Fall Risk Assessment, last 12 mths 3/29/2021   Able to walk? Yes   Fall in past 12 months? 0   Do you feel unsteady? 0   Are you worried about falling 0       Abuse Screen  Abuse Screening Questionnaire 3/29/2021   Do you ever feel afraid of your partner?  N   Are you in a relationship with someone who physically or mentally threatens you? N   Is it safe for you to go home? Y     Cognitive Screening   Evaluation of Cognitive Function:  Has your family/caregiver stated any concerns about your memory: no  Normal  No flowsheet data found. Patient Care Team   Patient Care Team:  Haroon Boyle MD as PCP - General (Family Medicine)  Haroon Boyle MD as PCP - [facility] Empaneled Provider  Gloria Stoll MD (Orthopedic Surgery)  Alton Andrade MD as Physician (Gastroenterology)  Dillon Marcus OD (Optometry)  Assessment/Plan   Education and counseling provided:  Are appropriate based on today's review and evaluation  End-of-Life planning (with patient's consent)  Pneumococcal Vaccine    Diagnoses and all orders for this visit:    1. Medicare annual wellness visit, subsequent: Overall doing well, Will get AAA screening in the future.     Health Maintenance Topics with due status: Overdue       Topic Date Due    COVID-19 Vaccine Never done    Shingrix Vaccine Age 49> Never done    AAA Screening 73-67 YO Male Smoking Patients Never done    Pneumococcal 65+ years 09/01/2020    Pneumococcal 65+ yrs at Risk Vaccine 09/01/2020     Health Maintenance Topics with due status: Not Due       Topic Last Completion Date    DTaP/Tdap/Td series 11/11/2014    Colorectal Cancer Screening Combo 10/04/2016    Lipid Screen 03/15/2021    Medicare Yearly Exam 03/29/2021     Health Maintenance Topics with due status: Completed       Topic Last Completion Date    Hepatitis C Screening 05/23/2016    Flu Vaccine 09/02/2020

## 2021-03-29 NOTE — PATIENT INSTRUCTIONS
Medicare Wellness Visit, Male The best way to live healthy is to have a lifestyle where you eat a well-balanced diet, exercise regularly, limit alcohol use, and quit all forms of tobacco/nicotine, if applicable. Regular preventive services are another way to keep healthy. Preventive services (vaccines, screening tests, monitoring & exams) can help personalize your care plan, which helps you manage your own care. Screening tests can find health problems at the earliest stages, when they are easiest to treat. [facility] follows the current, evidence-based guidelines published by the [facility] Elisa ([facility]) when recommending preventive services for our patients. Because we follow these guidelines, sometimes recommendations change over time as research supports it. (For example, a prostate screening blood test is no longer routinely recommended for men with no symptoms). Of course, you and your doctor may decide to screen more often for some diseases, based on your risk and co-morbidities (chronic disease you are already diagnosed with). Preventive services for you include: - Medicare offers their members a free annual wellness visit, which is time for you and your primary care provider to discuss and plan for your preventive service needs. Take advantage of this benefit every year! 
-All adults over age 72 should receive the recommended pneumonia vaccines. Current USPSTF guidelines recommend a series of two vaccines for the best pneumonia protection.  
-All adults should have a flu vaccine yearly and tetanus vaccine every 10 years. 
-All adults age 48 and older should receive the shingles vaccines (series of two vaccines).       
-All adults age 38-68 who are overweight should have a diabetes screening test once every three years.  
-Other screening tests & preventive services for persons with diabetes include: an eye exam to screen for diabetic retinopathy, a kidney function test, a foot exam, and stricter control over your cholesterol.  
-Cardiovascular screening for adults with routine risk involves an electrocardiogram (ECG) at intervals determined by the provider.  
-Colorectal cancer screening should be done for adults age 54-65 with no increased risk factors for colorectal cancer. There are a number of acceptable methods of screening for this type of cancer. Each test has its own benefits and drawbacks. Discuss with your provider what is most appropriate for you during your annual wellness visit. The different tests include: colonoscopy (considered the best screening method), a fecal occult blood test, a fecal DNA test, and sigmoidoscopy. 
-All adults born between [facility] should be screened once for Hepatitis C. 
-An Abdominal Aortic Aneurysm (AAA) Screening is recommended for men age 73-68 who has ever smoked in their lifetime. Here is a list of your current Health Maintenance items (your personalized list of preventive services) with a due date: 
Health Maintenance Due Topic Date Due  
 COVID-19 Vaccine (1) Never done  Shingles Vaccine (1 of 2) Never done  AAA Screening  Never done  Pneumococcal Vaccine (2 of 2 - PPSV23) 09/01/2020  Pneumococcal Vaccine 65+ years at Risk (2 of 2 - PPSV23) 09/01/2020

## 2021-04-29 RX ORDER — BUDESONIDE 180 UG/1
AEROSOL, POWDER RESPIRATORY (INHALATION)
Qty: 1 INHALER | Refills: 1 | Status: SHIPPED | OUTPATIENT
Start: 2021-04-29 | End: 2021-09-13

## 2021-05-06 NOTE — PROGRESS NOTES
Sonia Ryder is a 81 y.o. male who presents for routine immunizations. He denies any symptoms , reactions or allergies that would exclude them from being immunized today. Risks and adverse reactions were discussed and the VIS was given to them. All questions were addressed. There were no reactions observed.     [facility] [place] 0 = independent

## 2021-06-25 ENCOUNTER — TELEPHONE (OUTPATIENT)
Dept: FAMILY MEDICINE CLINIC | Age: 71
End: 2021-06-25

## 2021-07-06 ENCOUNTER — TELEPHONE (OUTPATIENT)
Dept: FAMILY MEDICINE CLINIC | Age: 71
End: 2021-07-06

## 2021-07-06 NOTE — TELEPHONE ENCOUNTER
Returned call to pt. Pt made aware that we did not call him from this office so was not sure what that call was about. Pt did not have any questions or concerns.

## 2021-09-01 NOTE — TELEPHONE ENCOUNTER
Phone call to patient, no answer. Received fax from [facility]. INR: 2.4 Per Dr. Silverio Ortiz: Continue current and recheck in one week. No

## 2021-09-29 ENCOUNTER — OFFICE VISIT (OUTPATIENT)
Dept: FAMILY MEDICINE CLINIC | Age: 71
End: 2021-09-29
Payer: MEDICARE

## 2021-09-29 VITALS
TEMPERATURE: 97.7 F | SYSTOLIC BLOOD PRESSURE: 126 MMHG | DIASTOLIC BLOOD PRESSURE: 75 MMHG | OXYGEN SATURATION: 97 % | HEIGHT: 71 IN | BODY MASS INDEX: 28.98 KG/M2 | WEIGHT: 207 LBS | HEART RATE: 61 BPM | RESPIRATION RATE: 18 BRPM

## 2021-09-29 DIAGNOSIS — Z85.46 HISTORY OF PROSTATE CANCER: ICD-10-CM

## 2021-09-29 DIAGNOSIS — M54.42 CHRONIC MIDLINE LOW BACK PAIN WITH LEFT-SIDED SCIATICA: ICD-10-CM

## 2021-09-29 DIAGNOSIS — G89.29 CHRONIC MIDLINE LOW BACK PAIN WITH LEFT-SIDED SCIATICA: ICD-10-CM

## 2021-09-29 DIAGNOSIS — Z86.73 HISTORY OF CVA (CEREBROVASCULAR ACCIDENT): ICD-10-CM

## 2021-09-29 DIAGNOSIS — E55.9 VITAMIN D DEFICIENCY: ICD-10-CM

## 2021-09-29 DIAGNOSIS — E78.2 MIXED HYPERLIPIDEMIA: ICD-10-CM

## 2021-09-29 DIAGNOSIS — I10 ESSENTIAL HYPERTENSION: Primary | ICD-10-CM

## 2021-09-29 DIAGNOSIS — E03.4 HYPOTHYROIDISM DUE TO ACQUIRED ATROPHY OF THYROID: ICD-10-CM

## 2021-09-29 PROCEDURE — G8427 DOCREV CUR MEDS BY ELIG CLIN: HCPCS | Performed by: FAMILY MEDICINE

## 2021-09-29 PROCEDURE — 99214 OFFICE O/P EST MOD 30 MIN: CPT | Performed by: FAMILY MEDICINE

## 2021-09-29 PROCEDURE — G8752 SYS BP LESS 140: HCPCS | Performed by: FAMILY MEDICINE

## 2021-09-29 PROCEDURE — G8536 NO DOC ELDER MAL SCRN: HCPCS | Performed by: FAMILY MEDICINE

## 2021-09-29 PROCEDURE — G8419 CALC BMI OUT NRM PARAM NOF/U: HCPCS | Performed by: FAMILY MEDICINE

## 2021-09-29 PROCEDURE — G8510 SCR DEP NEG, NO PLAN REQD: HCPCS | Performed by: FAMILY MEDICINE

## 2021-09-29 PROCEDURE — 1101F PT FALLS ASSESS-DOCD LE1/YR: CPT | Performed by: FAMILY MEDICINE

## 2021-09-29 PROCEDURE — 3017F COLORECTAL CA SCREEN DOC REV: CPT | Performed by: FAMILY MEDICINE

## 2021-09-29 PROCEDURE — G8754 DIAS BP LESS 90: HCPCS | Performed by: FAMILY MEDICINE

## 2021-09-29 NOTE — PROGRESS NOTES
Chief Complaint   Patient presents with    Follow Up Chronic Condition     Visit Vitals  /75 (BP 1 Location: Right arm)   Pulse 61   Temp 97.7 °F (36.5 °C) (Temporal)   Resp 18   Ht 5' 11\" (1.803 m)   Wt 207 lb (93.9 kg)   SpO2 97%   BMI 28.87 kg/m²     1. Have you been to the ER, urgent care clinic since your last visit? Hospitalized since your last visit? No    2. Have you seen or consulted any other health care providers outside of the [address] [place] [address] since your last visit? Include any pap smears or colon screening.  No    Reviewed record in preparation for visit and have necessary documentation  Pt did not bring medication to office visit for review  opportunity was given for questions  Goals that were addressed and/or need to be completed during or after this appointment include   Health Maintenance Due   Topic Date Due    MenB Meningococcal topic (1 of 4 - Increased Risk Bexsero 2-dose series) Never done    Shingrix Vaccine Age 50> (1 of 2) Never done    AAA Screening 73-69 YO Male Smoking Patients  Never done    Pneumococcal 65+ yrs at Risk Vaccine (2 of 2 - PPSV23) 09/01/2020    Flu Vaccine (1) 09/01/2021

## 2021-09-29 NOTE — PROGRESS NOTES
[facility]    History of Present Illness:   Kaykay Nguyen is a 73 y.o. male with history of HTN, CVA, Hypothyroidism, DDD, Prostate Cancer  CC: Follow up Chronic Conditions  History provided by patient and Records    HPI:  Hypertriglyceridemia Follow up:   Cardiovascular risks for him are: hypertension  hyperlipidemia  prior CVA/TIA or known carotid artery disease. Current Medications:  Key Antihyperlipidemia Meds             rosuvastatin (CRESTOR) 10 mg tablet (Taking) TAKE ONE TABLET BY MOUTH NIGHTLY ON MONDAY, WEDNESDAY AND FRIDAY OF EACH WEEK    omega-3 fatty acids-vitamin e (FISH OIL) 1,000 mg Cap (Taking) Take 1 Cap by mouth two (2) times a day. Compliance: NO   Myalgias: NO   Fatigue: NO   Other side effects: NO     Wt Readings from Last 3 Encounters:   09/29/21 207 lb (93.9 kg)   03/29/21 210 lb 6.4 oz (95.4 kg)   03/15/21 212 lb (96.2 kg)     Lab Results   Component Value Date/Time    Cholesterol, total 165 03/15/2021 09:00 AM    HDL Cholesterol 66 03/15/2021 09:00 AM    LDL, calculated 84 03/15/2021 09:00 AM    VLDL, calculated 15 03/15/2021 09:00 AM    Triglyceride 75 03/15/2021 09:00 AM    CHOL/HDL Ratio 2.5 03/15/2021 09:00 AM      Lab Results   Component Value Date/Time    ALT (SGPT) 26 03/15/2021 09:00 AM    AST (SGOT) 26 03/15/2021 09:00 AM    Alk. phosphatase 74 03/15/2021 09:00 AM    Bilirubin, total 0.8 03/15/2021 09:00 AM      Thyroid Disease Follow up:  Currently takes Levothyroxine 150 mcg. Thyroid medication has been has not been changed since last medication check and labs. Patient denies fatigue, nervousness, weight changes, heat or cold intolerance, bowel changes, skin changes, cardiovascular symptoms, hair loss, feeling excessive energy, tremor, palpitations, and/or weight loss.     Lab Results   Component Value Date/Time    TSH 0.84 03/15/2021 09:00 AM    T4, Free 1.3 03/15/2021 09:00 AM    T4, Total 9.6 11/10/2010 01:21 AM      History of CVA: No new changes. Chronic Back pain: Stable though not improving. Patient notes that pain radiates into legs. Hx of Prostate cancer: 10 years Cancer free. Health Maintenance  Health Maintenance Due   Topic Date Due    MenB Meningococcal topic (1 of 4 - Increased Risk Bexsero 2-dose series) Never done    Shingrix Vaccine Age 50> (1 of 2) Never done    AAA Screening 73-67 YO Male Smoking Patients  Never done    Pneumococcal 65+ yrs at Risk Vaccine (2 of 2 - PPSV23) 09/01/2020    Flu Vaccine (1) 09/01/2021       Past Medical, Family, and Social History:     Current Outpatient Medications on File Prior to Visit   Medication Sig Dispense Refill    Pulmicort Flexhaler 180 mcg/actuation aepb inhaler INHALE 1 PUFF BY MOUTH TWICE DAILY 1 Each 5    fluticasone propionate (FLONASE) 50 mcg/actuation nasal spray USE 2 SPRAYS IN EACH NOSTRIL DAILY 16 g 5    diclofenac (VOLTAREN) 1 % gel APPLY 1 GRAM TO AFFECTED AREA OF LOWER BACK FOUR TIMES A  g 2    Synthroid 150 mcg tablet TAKE 1 TABLET BY MOUTH DAILY BEFORE BREAKFAST 90 Tablet 1    amLODIPine (NORVASC) 2.5 mg tablet Take 1 Tab by mouth daily. 90 Tab 1    rosuvastatin (CRESTOR) 10 mg tablet TAKE ONE TABLET BY MOUTH NIGHTLY ON MONDAY, WEDNESDAY AND FRIDAY OF EACH WEEK 36 Tab 1    sildenafil citrate (VIAGRA) 50 mg tablet Take 1 Tab by mouth as needed. 20 minutes before relations. Max dose once daily. 10 Tab 4    aspirin (ASPIRIN) 325 mg tablet Take 1 Tab by mouth daily.  senna (SENNA) 8.6 mg tablet Take 1 Tab by mouth daily.  cyanocobalamin (VITAMIN B-12) 1,000 mcg tablet Take 1,000 mcg by mouth daily.  omega-3 fatty acids-vitamin e (FISH OIL) 1,000 mg Cap Take 1 Cap by mouth two (2) times a day.  varicella-zoster [facility]) injection Shingrix, one injection now and repeat in 4-6 months. To be administered at Pharmacy. Fax confirmation to me at [phone].   Indications: Prevention of Shingles 2 Each 0    albuterol (PROVENTIL HFA, VENTOLIN HFA, PROAIR HFA) 90 mcg/actuation inhaler Take 1 Puff by inhalation every four (4) hours as needed for Wheezing. (Patient not taking: Reported on 2021) 1 Inhaler 5    magnesium oxide (MAG-OX) 400 mg tablet Take 400 mg by mouth daily. (Patient not taking: Reported on 2021)      [CANCELED] magnesium oxide (MAG-OXIDE) 400 mg tablet Take 1 Tab by mouth daily. 90 Tab 3     No current facility-administered medications on file prior to visit. Patient Active Problem List   Diagnosis Code    Mixed hyperlipidemia E78.2    Benign neoplasm of colon D12.6    Keloid L91.0    Internal hemorrhoid K64.8    Thyroglossal duct cyst Q89.2    Hypothyroidism E03.9    Hypertension I10    History of CVA (cerebrovascular accident) Z80.78    Spinal stenosis of cervical region M48.02    Pain R52    Shoulder-hand syndrome M89.09    Weakness R53.1    Poor historian Z78.9    Chronic midline low back pain with left-sided sciatica M54.42, G89.29    History of prostate cancer Z85.46       Social History     Socioeconomic History    Marital status:      Spouse name: Not on file    Number of children: Not on file    Years of education: Not on file    Highest education level: Not on file   Occupational History    Not on file   Tobacco Use    Smoking status: Former Smoker     Packs/day: 0.25     Years: 40.00     Pack years: 10.00     Types: Cigarettes     Quit date: 2015     Years since quittin.6    Smokeless tobacco: Never Used    Tobacco comment: smokes 1/2 pack week   Substance and Sexual Activity    Alcohol use:  Yes     Alcohol/week: 5.0 standard drinks     Types: 6 Cans of beer per week     Comment: moderately    Drug use: No    Sexual activity: Yes   Other Topics Concern    Not on file   Social History Narrative    Not on file     Social Determinants of Health     Financial Resource Strain:     Difficulty of Paying Living Expenses:    Food Insecurity:     Worried About Running Out of Food in the Last Year:    [address] in the Last Year:    Transportation Needs:     Lack of Transportation (Medical):  Lack of Transportation (Non-Medical):    Physical Activity:     Days of Exercise per Week:     Minutes of Exercise per Session:    Stress:     Feeling of Stress :    Social Connections:     Frequency of Communication with Friends and Family:     Frequency of Social Gatherings with Friends and Family:     Attends Spiritism Services:     Active Member of Clubs or Organizations:     Attends Club or Organization Meetings:     Marital Status:    Intimate Partner Violence:     Fear of Current or Ex-Partner:     Emotionally Abused:     Physically Abused:     Sexually Abused:        Review of Systems   Review of Systems   Constitutional: Negative for chills and fever. HENT: Negative for congestion. Respiratory: Negative for cough and hemoptysis. Cardiovascular: Negative for chest pain and palpitations. Gastrointestinal: Negative for nausea and vomiting. Genitourinary: Negative for dysuria and urgency. Objective:     Visit Vitals  /75 (BP 1 Location: Right arm)   Pulse 61   Temp 97.7 °F (36.5 °C) (Temporal)   Resp 18   Ht 5' 11\" (1.803 m)   Wt 207 lb (93.9 kg)   SpO2 97%   BMI 28.87 kg/m²        Physical Exam  Vitals and nursing note reviewed. Constitutional:       Appearance: Normal appearance. HENT:      Head: Normocephalic and atraumatic. Cardiovascular:      Rate and Rhythm: Normal rate and regular rhythm. Pulses: Normal pulses. Heart sounds: Normal heart sounds. No murmur heard. No friction rub. No gallop. Pulmonary:      Effort: Pulmonary effort is normal.      Breath sounds: Normal breath sounds. Abdominal:      General: Abdomen is flat. Bowel sounds are normal.      Palpations: Abdomen is soft. Musculoskeletal:         General: Normal range of motion. Cervical back: Normal range of motion and neck supple.    Skin: General: Skin is warm and dry. Neurological:      Mental Status: He is alert. Pertinent Labs/Studies:      Assessment and orders:       ICD-10-CM ICD-9-CM    1. Essential hypertension  I10 401.9 CBC W/O DIFF      METABOLIC PANEL, COMPREHENSIVE   2. Hypothyroidism due to acquired atrophy of thyroid  E03.4 244.8 T4, FREE     246.8 TSH 3RD GENERATION   3. Chronic midline low back pain with left-sided sciatica  M54.42 724.2     G89.29 724.3      338.29    4. History of CVA (cerebrovascular accident)  Z86.73 V12.54    5. Mixed hyperlipidemia  E78.2 272.2 LIPID PANEL   6. History of prostate cancer  Z85.46 V10.46    7. Vitamin D deficiency  E55.9 268.9 VITAMIN D, 25 HYDROXY     Diagnoses and all orders for this visit:    1. Essential hypertension: Our goal is to normalize the blood pressure to decrease the risks of strokes and heart attacks. The patient is in agreement with the plan. -     CBC W/O DIFF; Future  -     METABOLIC PANEL, COMPREHENSIVE; Future    2. Hypothyroidism due to acquired atrophy of thyroid: Condition and symptoms are well controlled at this time. No changes to therapy at this time. -     T4, FREE; Future  -     TSH 3RD GENERATION; Future    3. Chronic midline low back pain with left-sided sciatica: Stable    4. History of CVA (cerebrovascular accident)    5. Mixed hyperlipidemia: The patient is aware of our goal to reduce or eliminate the long term problems (such as strokes and heart attacks) related to poorly controlled Triglycerides, LDL, Cholesterol.   -     LIPID PANEL; Future    6. History of prostate cancer    7. Vitamin D deficiency  -     VITAMIN D, 25 HYDROXY; Future      Follow-up and Dispositions    · Return in about 4 months (around 1/29/2022) for Follow up Chronic coditions. I have discussed the diagnosis with the patient and the intended plan as seen in the above orders. Social history, medical history, and labs were reviewed.   The patient has received an after-visit summary and questions were answered concerning future plans. I have discussed medication side effects and warnings with the patient as well.     MD GENNY Parker & ROSALINDA LAWS [facility]  09/29/21

## 2021-09-30 LAB
25(OH)D3 SERPL-MCNC: 20.9 NG/ML (ref 30–100)
ALBUMIN SERPL-MCNC: 3.9 G/DL (ref 3.5–5)
ALBUMIN/GLOB SERPL: 1 {RATIO} (ref 1.1–2.2)
ALP SERPL-CCNC: 71 U/L (ref 45–117)
ALT SERPL-CCNC: 23 U/L (ref 12–78)
ANION GAP SERPL CALC-SCNC: 4 MMOL/L (ref 5–15)
AST SERPL-CCNC: 22 U/L (ref 15–37)
BILIRUB SERPL-MCNC: 0.8 MG/DL (ref 0.2–1)
BUN SERPL-MCNC: 15 MG/DL (ref 6–20)
BUN/CREAT SERPL: 18 (ref 12–20)
CALCIUM SERPL-MCNC: 10 MG/DL (ref 8.5–10.1)
CHLORIDE SERPL-SCNC: 109 MMOL/L (ref 97–108)
CHOLEST SERPL-MCNC: 166 MG/DL
CO2 SERPL-SCNC: 27 MMOL/L (ref 21–32)
CREAT SERPL-MCNC: 0.84 MG/DL (ref 0.7–1.3)
ERYTHROCYTE [DISTWIDTH] IN BLOOD BY AUTOMATED COUNT: 13.8 % (ref 11.5–14.5)
GLOBULIN SER CALC-MCNC: 4.1 G/DL (ref 2–4)
GLUCOSE SERPL-MCNC: 100 MG/DL (ref 65–100)
HCT VFR BLD AUTO: 41.4 % (ref 36.6–50.3)
HDLC SERPL-MCNC: 59 MG/DL
HDLC SERPL: 2.8 {RATIO} (ref 0–5)
HGB BLD-MCNC: 13 G/DL (ref 12.1–17)
LDLC SERPL CALC-MCNC: 92.4 MG/DL (ref 0–100)
MCH RBC QN AUTO: 29.5 PG (ref 26–34)
MCHC RBC AUTO-ENTMCNC: 31.4 G/DL (ref 30–36.5)
MCV RBC AUTO: 94.1 FL (ref 80–99)
NRBC # BLD: 0 K/UL (ref 0–0.01)
NRBC BLD-RTO: 0 PER 100 WBC
PLATELET # BLD AUTO: 251 K/UL (ref 150–400)
PMV BLD AUTO: 11.2 FL (ref 8.9–12.9)
POTASSIUM SERPL-SCNC: 4.6 MMOL/L (ref 3.5–5.1)
PROT SERPL-MCNC: 8 G/DL (ref 6.4–8.2)
RBC # BLD AUTO: 4.4 M/UL (ref 4.1–5.7)
SODIUM SERPL-SCNC: 140 MMOL/L (ref 136–145)
T4 FREE SERPL-MCNC: 1.2 NG/DL (ref 0.8–1.5)
TRIGL SERPL-MCNC: 73 MG/DL (ref ?–150)
TSH SERPL DL<=0.05 MIU/L-ACNC: 0.65 UIU/ML (ref 0.36–3.74)
VLDLC SERPL CALC-MCNC: 14.6 MG/DL
WBC # BLD AUTO: 6.5 K/UL (ref 4.1–11.1)

## 2021-10-08 ENCOUNTER — CLINICAL SUPPORT (OUTPATIENT)
Dept: FAMILY MEDICINE CLINIC | Age: 71
End: 2021-10-08
Payer: MEDICARE

## 2021-10-08 VITALS
SYSTOLIC BLOOD PRESSURE: 140 MMHG | TEMPERATURE: 98.2 F | DIASTOLIC BLOOD PRESSURE: 83 MMHG | RESPIRATION RATE: 20 BRPM | OXYGEN SATURATION: 97 % | HEART RATE: 77 BPM

## 2021-10-08 DIAGNOSIS — Z23 ENCOUNTER FOR IMMUNIZATION: Primary | ICD-10-CM

## 2021-10-08 PROCEDURE — G0008 ADMIN INFLUENZA VIRUS VAC: HCPCS | Performed by: FAMILY MEDICINE

## 2021-10-08 PROCEDURE — 90694 VACC AIIV4 NO PRSRV 0.5ML IM: CPT | Performed by: FAMILY MEDICINE

## 2021-12-08 DIAGNOSIS — I10 ESSENTIAL HYPERTENSION: Chronic | ICD-10-CM

## 2021-12-08 RX ORDER — AMLODIPINE BESYLATE 2.5 MG/1
TABLET ORAL
Qty: 90 TABLET | Refills: 0 | Status: SHIPPED | OUTPATIENT
Start: 2021-12-08 | End: 2022-02-08 | Stop reason: SDUPTHER

## 2022-02-08 ENCOUNTER — OFFICE VISIT (OUTPATIENT)
Dept: FAMILY MEDICINE CLINIC | Age: 72
End: 2022-02-08
Payer: MEDICARE

## 2022-02-08 VITALS
RESPIRATION RATE: 16 BRPM | SYSTOLIC BLOOD PRESSURE: 128 MMHG | DIASTOLIC BLOOD PRESSURE: 77 MMHG | WEIGHT: 209.6 LBS | TEMPERATURE: 97 F | BODY MASS INDEX: 29.34 KG/M2 | HEIGHT: 71 IN | HEART RATE: 64 BPM | OXYGEN SATURATION: 97 %

## 2022-02-08 DIAGNOSIS — M62.838 NECK MUSCLE SPASM: ICD-10-CM

## 2022-02-08 DIAGNOSIS — Z85.46 HISTORY OF PROSTATE CANCER: ICD-10-CM

## 2022-02-08 DIAGNOSIS — J41.0 SIMPLE CHRONIC BRONCHITIS (HCC): ICD-10-CM

## 2022-02-08 DIAGNOSIS — Z86.73 HISTORY OF CVA (CEREBROVASCULAR ACCIDENT): ICD-10-CM

## 2022-02-08 DIAGNOSIS — E03.4 HYPOTHYROIDISM DUE TO ACQUIRED ATROPHY OF THYROID: Chronic | ICD-10-CM

## 2022-02-08 DIAGNOSIS — E78.2 MIXED HYPERLIPIDEMIA: Chronic | ICD-10-CM

## 2022-02-08 DIAGNOSIS — I10 ESSENTIAL HYPERTENSION: Chronic | ICD-10-CM

## 2022-02-08 DIAGNOSIS — M48.02 SPINAL STENOSIS OF CERVICAL REGION: ICD-10-CM

## 2022-02-08 DIAGNOSIS — G89.29 CHRONIC MIDLINE LOW BACK PAIN WITH LEFT-SIDED SCIATICA: Chronic | ICD-10-CM

## 2022-02-08 DIAGNOSIS — M54.42 CHRONIC MIDLINE LOW BACK PAIN WITH LEFT-SIDED SCIATICA: Chronic | ICD-10-CM

## 2022-02-08 DIAGNOSIS — I10 PRIMARY HYPERTENSION: Primary | Chronic | ICD-10-CM

## 2022-02-08 PROCEDURE — 99214 OFFICE O/P EST MOD 30 MIN: CPT | Performed by: FAMILY MEDICINE

## 2022-02-08 PROCEDURE — 3017F COLORECTAL CA SCREEN DOC REV: CPT | Performed by: FAMILY MEDICINE

## 2022-02-08 PROCEDURE — 1101F PT FALLS ASSESS-DOCD LE1/YR: CPT | Performed by: FAMILY MEDICINE

## 2022-02-08 PROCEDURE — G8754 DIAS BP LESS 90: HCPCS | Performed by: FAMILY MEDICINE

## 2022-02-08 PROCEDURE — G8510 SCR DEP NEG, NO PLAN REQD: HCPCS | Performed by: FAMILY MEDICINE

## 2022-02-08 PROCEDURE — G8419 CALC BMI OUT NRM PARAM NOF/U: HCPCS | Performed by: FAMILY MEDICINE

## 2022-02-08 PROCEDURE — G8536 NO DOC ELDER MAL SCRN: HCPCS | Performed by: FAMILY MEDICINE

## 2022-02-08 PROCEDURE — G8752 SYS BP LESS 140: HCPCS | Performed by: FAMILY MEDICINE

## 2022-02-08 PROCEDURE — G8427 DOCREV CUR MEDS BY ELIG CLIN: HCPCS | Performed by: FAMILY MEDICINE

## 2022-02-08 RX ORDER — BUDESONIDE 180 UG/1
1 AEROSOL, POWDER RESPIRATORY (INHALATION) 2 TIMES DAILY
Qty: 1 EACH | Refills: 5 | Status: SHIPPED | OUTPATIENT
Start: 2022-02-08

## 2022-02-08 RX ORDER — LEVOTHYROXINE SODIUM 150 MCG
150 TABLET ORAL
Qty: 90 TABLET | Refills: 1 | Status: SHIPPED | OUTPATIENT
Start: 2022-02-08 | End: 2022-06-08 | Stop reason: SDUPTHER

## 2022-02-08 RX ORDER — TADALAFIL 5 MG/1
TABLET ORAL
COMMUNITY
Start: 2022-01-12

## 2022-02-08 RX ORDER — DICLOFENAC SODIUM 10 MG/G
1 GEL TOPICAL EVERY 6 HOURS
Qty: 350 G | Refills: 3 | Status: SHIPPED | OUTPATIENT
Start: 2022-02-08

## 2022-02-08 RX ORDER — CYCLOBENZAPRINE HCL 5 MG
5 TABLET ORAL
Qty: 15 TABLET | Refills: 1 | Status: SHIPPED | OUTPATIENT
Start: 2022-02-08 | End: 2022-07-06

## 2022-02-08 RX ORDER — DOXYCYCLINE 100 MG/1
100 CAPSULE ORAL 2 TIMES DAILY
Qty: 14 CAPSULE | Refills: 0 | Status: SHIPPED | OUTPATIENT
Start: 2022-02-08 | End: 2022-07-06

## 2022-02-08 RX ORDER — AMLODIPINE BESYLATE 2.5 MG/1
2.5 TABLET ORAL DAILY
Qty: 90 TABLET | Refills: 1 | Status: SHIPPED | OUTPATIENT
Start: 2022-02-08 | End: 2022-06-08 | Stop reason: SDUPTHER

## 2022-02-08 NOTE — PROGRESS NOTES
1. Have you been to the ER, urgent care clinic since your last visit? Hospitalized since your last visit? No    2. Have you seen or consulted any other health care providers outside of the [address] [place] [address] since your last visit? Include any pap smears or colon screening. Yes When: neurologist, dr Ap Camacho record in preparation for visit and have necessary documentation  Pt did not bring medication to office visit for review  Patient is accompanied by self I have received verbal consent from Taylor Patrick to discuss any/all medical information while they are present in the room.     Goals that were addressed and/or need to be completed during or after this appointment include     Health Maintenance Due   Topic Date Due    MenB Meningococcal topic (1 of 4 - Increased Risk Bexsero 2-dose series) Never done    Shingrix Vaccine Age 50> (1 of 2) Never done    AAA Screening 73-69 YO Male Smoking Patients  Never done    Pneumococcal 65+ yrs at Risk Vaccine (2 of 2 - PPSV23) 09/01/2020    COVID-19 Vaccine (3 - Booster for Moderna series) 09/06/2021

## 2022-02-08 NOTE — PROGRESS NOTES
[facility]    History of Present Illness:   Lupie Alpers is a 73 y.o. male with history of HTN, CVA, Hypothyroidism, DDD, Prostate Cancer, Vitamin D deficiency  CC: Follow up Chronic conditions, Neck pain  History provided by patient and Records    HPI:    Neck Pain: Noting 4 days of neck spasm, hard to turn neck to the left in particular, has used some Voltaren gel, but is running out. Pain is moderate in intensity. Has caused some trouble with work. Hypertension Follow up:  Currently Taking:   Key CAD CHF Meds             amLODIPine (NORVASC) 2.5 mg tablet (Taking) TAKE ONE TABLET BY MOUTH ONCE A DAY    rosuvastatin (CRESTOR) 10 mg tablet (Taking) TAKE ONE TABLET BY MOUTH NIGHTLY ON MONDAY, WEDNESDAY AND FRIDAY OF EACH WEEK    aspirin (ASPIRIN) 325 mg tablet (Taking) Take 1 Tab by mouth daily. omega-3 fatty acids-vitamin e (FISH OIL) 1,000 mg Cap (Taking) Take 1 Cap by mouth two (2) times a day. The patient reports:  taking medications as instructed, no medication side effects noted, no TIA's, no chest pain on exertion, no dyspnea on exertion, no swelling of ankles, no orthostatic dizziness or lightheadedness, no orthopnea or paroxysmal nocturnal dyspnea. BP Readings from Last 3 Encounters:   02/08/22 128/77   10/08/21 (!) 140/83   09/29/21 126/75        Hypertriglyceridemia Follow up:   Cardiovascular risks for him are: hypertension  hyperlipidemia  prior CVA/TIA or known carotid artery disease. Current Medications:  Key Antihyperlipidemia Meds             rosuvastatin (CRESTOR) 10 mg tablet (Taking) TAKE ONE TABLET BY MOUTH NIGHTLY ON MONDAY, WEDNESDAY AND FRIDAY OF EACH WEEK    omega-3 fatty acids-vitamin e (FISH OIL) 1,000 mg Cap (Taking) Take 1 Cap by mouth two (2) times a day.           Compliance: NO   Myalgias: NO   Fatigue: NO   Other side effects: NO     Wt Readings from Last 3 Encounters:   02/08/22 209 lb 9.6 oz (95.1 kg)   09/29/21 207 lb (93.9 kg) 03/29/21 210 lb 6.4 oz (95.4 kg)       Lab Results   Component Value Date/Time    Cholesterol, total 166 09/29/2021 08:54 AM    HDL Cholesterol 59 09/29/2021 08:54 AM    LDL, calculated 92.4 09/29/2021 08:54 AM    VLDL, calculated 14.6 09/29/2021 08:54 AM    Triglyceride 73 09/29/2021 08:54 AM    CHOL/HDL Ratio 2.8 09/29/2021 08:54 AM      Lab Results   Component Value Date/Time    ALT (SGPT) 23 09/29/2021 08:54 AM    AST (SGOT) 22 09/29/2021 08:54 AM    Alk. phosphatase 71 09/29/2021 08:54 AM    Bilirubin, total 0.8 09/29/2021 08:54 AM      Thyroid Disease Follow up:  Currently takes Levothyroxine 150 mcg. Thyroid medication has been has not been changed since last medication check and labs. Patient denies fatigue, nervousness, weight changes, heat or cold intolerance, bowel changes, skin changes, cardiovascular symptoms, hair loss, feeling excessive energy, tremor, palpitations, and/or weight loss. Lab Results   Component Value Date/Time    TSH 0.65 09/29/2021 08:54 AM    T4, Free 1.2 09/29/2021 08:54 AM    T4, Total 9.6 11/10/2010 01:21 AM      History of CVA: No new changes. Hx of Prostate cancer: 10 years Cancer free. Health Maintenance  Health Maintenance Due   Topic Date Due    MenB Meningococcal topic (1 of 4 - Increased Risk Bexsero 2-dose series) Never done    Shingrix Vaccine Age 50> (1 of 2) Never done    AAA Screening 73-67 YO Male Smoking Patients  Never done    Pneumococcal 65+ yrs at Risk Vaccine (2 of 2 - PPSV23) 09/01/2020    COVID-19 Vaccine (3 - Booster for Yvette Estimable series) 09/06/2021       Past Medical, Family, and Social History:     Current Outpatient Medications on File Prior to Visit   Medication Sig Dispense Refill    tadalafiL (CIALIS) 5 mg tablet       DOCUSATE SODIUM PO Take  by mouth.       amLODIPine (NORVASC) 2.5 mg tablet TAKE ONE TABLET BY MOUTH ONCE A DAY 90 Tablet 0    rosuvastatin (CRESTOR) 10 mg tablet TAKE ONE TABLET BY MOUTH NIGHTLY ON MONDAY, WEDNESDAY AND FRIDAY OF EACH WEEK 36 Tablet 1    Pulmicort Flexhaler 180 mcg/actuation aepb inhaler INHALE 1 PUFF BY MOUTH TWICE DAILY 1 Each 5    fluticasone propionate (FLONASE) 50 mcg/actuation nasal spray USE 2 SPRAYS IN EACH NOSTRIL DAILY 16 g 5    Synthroid 150 mcg tablet TAKE 1 TABLET BY MOUTH DAILY BEFORE BREAKFAST 90 Tablet 1    aspirin (ASPIRIN) 325 mg tablet Take 1 Tab by mouth daily.  cyanocobalamin (VITAMIN B-12) 1,000 mcg tablet Take 1,000 mcg by mouth daily.  omega-3 fatty acids-vitamin e (FISH OIL) 1,000 mg Cap Take 1 Cap by mouth two (2) times a day.  diclofenac (VOLTAREN) 1 % gel APPLY 1 GRAM TO AFFECTED AREA OF LOWER BACK FOUR TIMES A  g 2    varicella-zoster (SHINGRIX) injection Shingrix, one injection now and repeat in 4-6 months. To be administered at Pharmacy. Fax confirmation to me at [phone]. Indications: Prevention of Shingles (Patient not taking: Reported on 2/8/2022) 2 Each 0    sildenafil citrate (VIAGRA) 50 mg tablet Take 1 Tab by mouth as needed. 20 minutes before relations. Max dose once daily. (Patient not taking: Reported on 2/8/2022) 10 Tab 4    albuterol (PROVENTIL HFA, VENTOLIN HFA, PROAIR HFA) 90 mcg/actuation inhaler Take 1 Puff by inhalation every four (4) hours as needed for Wheezing. (Patient not taking: Reported on 9/29/2021) 1 Inhaler 5    magnesium oxide (MAG-OX) 400 mg tablet Take 400 mg by mouth daily. (Patient not taking: Reported on 9/29/2021)      senna (SENNA) 8.6 mg tablet Take 1 Tab by mouth daily. (Patient not taking: Reported on 2/8/2022)      [CANCELED] magnesium oxide (MAG-OXIDE) 400 mg tablet Take 1 Tab by mouth daily. 90 Tab 3     No current facility-administered medications on file prior to visit.        Patient Active Problem List   Diagnosis Code    Mixed hyperlipidemia E78.2    Benign neoplasm of colon D12.6    Keloid L91.0    Internal hemorrhoid K64.8    Thyroglossal duct cyst Q89.2    Hypothyroidism E03.9    Hypertension I10    History of CVA (cerebrovascular accident) Z80.78    Spinal stenosis of cervical region M48.02    Pain R52    Shoulder-hand syndrome M89.09    Weakness R53.1    Poor historian Z78.9    Chronic midline low back pain with left-sided sciatica M54.42, G89.29    History of prostate cancer Z85.46       Social History     Socioeconomic History    Marital status:    Tobacco Use    Smoking status: Former Smoker     Packs/day: 0.25     Years: 40.00     Pack years: 10.00     Types: Cigarettes     Quit date: 2015     Years since quittin.0    Smokeless tobacco: Never Used    Tobacco comment: smokes 1/2 pack week   Substance and Sexual Activity    Alcohol use: Yes     Alcohol/week: 5.0 standard drinks     Types: 6 Cans of beer per week     Comment: moderately    Drug use: No    Sexual activity: Yes        Review of Systems   Review of Systems   Constitutional: Negative for chills, fever and weight loss. Cardiovascular: Negative for chest pain and palpitations. Gastrointestinal: Negative for abdominal pain, nausea and vomiting. Neurological: Negative for dizziness and headaches. Objective:     Visit Vitals  /77 (BP 1 Location: Left upper arm, BP Patient Position: Sitting, BP Cuff Size: Large adult)   Pulse 64   Temp 97 °F (36.1 °C) (Oral)   Resp 16   Ht 5' 11\" (1.803 m)   Wt 209 lb 9.6 oz (95.1 kg)   SpO2 97%   BMI 29.23 kg/m²        Physical Exam  Vitals reviewed. Constitutional:       Appearance: Normal appearance. HENT:      Head: Normocephalic and atraumatic. Cardiovascular:      Rate and Rhythm: Normal rate and regular rhythm. Pulses: Normal pulses. Heart sounds: Normal heart sounds. Pulmonary:      Effort: Pulmonary effort is normal.      Breath sounds: Normal breath sounds. Abdominal:      General: Abdomen is flat. Bowel sounds are normal.      Palpations: Abdomen is soft. Musculoskeletal:         General: Normal range of motion. Cervical back: Normal range of motion and neck supple. Skin:     Comments: Significant keloid material on abdomen and chest.  Draining material from central area. Neurological:      General: No focal deficit present. Mental Status: He is alert and oriented to person, place, and time. Pertinent Labs/Studies:      Assessment and orders:       ICD-10-CM ICD-9-CM    1. Primary hypertension  I10 401.9 CBC W/O DIFF      METABOLIC PANEL, COMPREHENSIVE   2. Simple chronic bronchitis (HCC)  J41.0 491.0 budesonide (Pulmicort Flexhaler) 180 mcg/actuation aepb inhaler   3. Hypothyroidism due to acquired atrophy of thyroid  E03.4 244.8 Synthroid 150 mcg tablet     246.8 T4, FREE      TSH 3RD GENERATION   4. Mixed hyperlipidemia  E78.2 272.2 LIPID PANEL   5. History of CVA (cerebrovascular accident)  Z86.73 V12.54    6. History of prostate cancer  Z85.46 V10.46    7. Spinal stenosis of cervical region  M48.02 723.0    8. Chronic midline low back pain with left-sided sciatica  M54.42 724.2 diclofenac (VOLTAREN) 1 % gel    G89.29 724.3      338.29    9. Essential hypertension  I10 401.9 amLODIPine (NORVASC) 2.5 mg tablet   10. Neck muscle spasm  M62.838 728.85 cyclobenzaprine (FLEXERIL) 5 mg tablet     Diagnoses and all orders for this visit:    1. Primary hypertension:Our goal is to normalize the blood pressure to decrease the risks of strokes and heart attacks. The patient is in agreement with the plan. -     CBC W/O DIFF; Future  -     METABOLIC PANEL, COMPREHENSIVE; Future    2. Simple chronic bronchitis ([address]): Refill  -     budesonide (Pulmicort Flexhaler) 180 mcg/actuation aepb inhaler; Take 1 Puff by inhalation two (2) times a day. 3. Hypothyroidism due to acquired atrophy of thyroid: Refills nad labs  -     Synthroid 150 mcg tablet; Take 1 Tablet by mouth Daily (before breakfast). -     T4, FREE; Future  -     TSH 3RD GENERATION; Future    4.  Mixed hyperlipidemia: The patient is aware of our goal to reduce or eliminate the long term problems (such as strokes and heart attacks) related to poorly controlled Triglycerides, LDL, Cholesterol.   -     LIPID PANEL; Future    5. History of CVA (cerebrovascular accident)    6. History of prostate cancer    7. Spinal stenosis of cervical region    8. Chronic midline low back pain with left-sided sciatica: Refill  -     diclofenac (VOLTAREN) 1 % gel; Apply 1 g to affected area every six (6) hours. Apply to neck, legs    9. Essential hypertension: Refill  -     amLODIPine (NORVASC) 2.5 mg tablet; Take 1 Tablet by mouth daily. 10. Neck muscle spasm  -     cyclobenzaprine (FLEXERIL) 5 mg tablet; Take 1 Tablet by mouth nightly. Other orders: Patient with area of abscess, is draining, but will treat with Abx  -     doxycycline (MONODOX) 100 mg capsule; Take 1 Capsule by mouth two (2) times a day. Follow-up and Dispositions    · Return in about 3 months (around 5/8/2022). I have discussed the diagnosis with the patient and the intended plan as seen in the above orders. Social history, medical history, and labs were reviewed. The patient has received an after-visit summary and questions were answered concerning future plans. I have discussed medication side effects and warnings with the patient as well.     MD GENNY Sylvester & ROSALINDA LAWS [facility]  02/08/22

## 2022-02-09 LAB
ALBUMIN SERPL-MCNC: 4.5 G/DL (ref 3.8–4.8)
ALBUMIN/GLOB SERPL: 1.4 {RATIO} (ref 1.2–2.2)
ALP SERPL-CCNC: 69 IU/L (ref 44–121)
ALT SERPL-CCNC: 17 IU/L (ref 0–44)
AST SERPL-CCNC: 22 IU/L (ref 0–40)
BILIRUB SERPL-MCNC: 0.6 MG/DL (ref 0–1.2)
BUN SERPL-MCNC: 12 MG/DL (ref 8–27)
BUN/CREAT SERPL: 16 (ref 10–24)
CALCIUM SERPL-MCNC: 10.5 MG/DL (ref 8.6–10.2)
CHLORIDE SERPL-SCNC: 107 MMOL/L (ref 96–106)
CHOLEST SERPL-MCNC: 168 MG/DL (ref 100–199)
CO2 SERPL-SCNC: 21 MMOL/L (ref 20–29)
CREAT SERPL-MCNC: 0.76 MG/DL (ref 0.76–1.27)
ERYTHROCYTE [DISTWIDTH] IN BLOOD BY AUTOMATED COUNT: 12.2 % (ref 11.6–15.4)
GLOBULIN SER CALC-MCNC: 3.2 G/DL (ref 1.5–4.5)
GLUCOSE SERPL-MCNC: 105 MG/DL (ref 65–99)
HCT VFR BLD AUTO: 37.4 % (ref 37.5–51)
HDLC SERPL-MCNC: 60 MG/DL
HGB BLD-MCNC: 12.9 G/DL (ref 13–17.7)
LDLC SERPL CALC-MCNC: 94 MG/DL (ref 0–99)
MCH RBC QN AUTO: 30.1 PG (ref 26.6–33)
MCHC RBC AUTO-ENTMCNC: 34.5 G/DL (ref 31.5–35.7)
MCV RBC AUTO: 87 FL (ref 79–97)
PLATELET # BLD AUTO: 266 X10E3/UL (ref 150–450)
POTASSIUM SERPL-SCNC: 4.5 MMOL/L (ref 3.5–5.2)
PROT SERPL-MCNC: 7.7 G/DL (ref 6–8.5)
RBC # BLD AUTO: 4.29 X10E6/UL (ref 4.14–5.8)
SODIUM SERPL-SCNC: 141 MMOL/L (ref 134–144)
T4 FREE SERPL-MCNC: 1.41 NG/DL (ref 0.82–1.77)
TRIGL SERPL-MCNC: 75 MG/DL (ref 0–149)
TSH SERPL DL<=0.005 MIU/L-ACNC: 3.37 UIU/ML (ref 0.45–4.5)
VLDLC SERPL CALC-MCNC: 14 MG/DL (ref 5–40)
WBC # BLD AUTO: 6.8 X10E3/UL (ref 3.4–10.8)

## 2022-03-18 PROBLEM — M54.42 CHRONIC MIDLINE LOW BACK PAIN WITH LEFT-SIDED SCIATICA: Status: ACTIVE | Noted: 2020-09-30

## 2022-03-18 PROBLEM — G89.29 CHRONIC MIDLINE LOW BACK PAIN WITH LEFT-SIDED SCIATICA: Status: ACTIVE | Noted: 2020-09-30

## 2022-03-19 PROBLEM — Z78.9 POOR HISTORIAN: Status: ACTIVE | Noted: 2018-07-12

## 2022-03-19 PROBLEM — M89.09 SHOULDER-HAND SYNDROME: Status: ACTIVE | Noted: 2018-04-20

## 2022-03-19 PROBLEM — R53.1 WEAKNESS: Status: ACTIVE | Noted: 2018-04-20

## 2022-03-19 PROBLEM — M48.02 SPINAL STENOSIS OF CERVICAL REGION: Status: ACTIVE | Noted: 2018-04-20

## 2022-03-19 PROBLEM — R52 PAIN: Status: ACTIVE | Noted: 2018-04-20

## 2022-03-20 PROBLEM — Z85.46 HISTORY OF PROSTATE CANCER: Status: ACTIVE | Noted: 2021-09-29

## 2022-06-08 ENCOUNTER — OFFICE VISIT (OUTPATIENT)
Dept: FAMILY MEDICINE CLINIC | Age: 72
End: 2022-06-08
Payer: MEDICARE

## 2022-06-08 VITALS
HEART RATE: 60 BPM | OXYGEN SATURATION: 97 % | HEIGHT: 71 IN | BODY MASS INDEX: 28.7 KG/M2 | RESPIRATION RATE: 18 BRPM | WEIGHT: 205 LBS | DIASTOLIC BLOOD PRESSURE: 79 MMHG | TEMPERATURE: 98.3 F | SYSTOLIC BLOOD PRESSURE: 128 MMHG

## 2022-06-08 DIAGNOSIS — I10 ESSENTIAL HYPERTENSION: Chronic | ICD-10-CM

## 2022-06-08 DIAGNOSIS — M54.42 CHRONIC MIDLINE LOW BACK PAIN WITH LEFT-SIDED SCIATICA: Primary | Chronic | ICD-10-CM

## 2022-06-08 DIAGNOSIS — E78.2 MIXED HYPERLIPIDEMIA: Chronic | ICD-10-CM

## 2022-06-08 DIAGNOSIS — E03.4 HYPOTHYROIDISM DUE TO ACQUIRED ATROPHY OF THYROID: Chronic | ICD-10-CM

## 2022-06-08 DIAGNOSIS — G89.29 CHRONIC MIDLINE LOW BACK PAIN WITH LEFT-SIDED SCIATICA: Primary | Chronic | ICD-10-CM

## 2022-06-08 DIAGNOSIS — Z86.73 HISTORY OF CVA (CEREBROVASCULAR ACCIDENT): ICD-10-CM

## 2022-06-08 DIAGNOSIS — Z91.09 ENVIRONMENTAL ALLERGIES: ICD-10-CM

## 2022-06-08 PROBLEM — M48.061 FORAMINAL STENOSIS OF LUMBAR REGION: Status: ACTIVE | Noted: 2022-06-08

## 2022-06-08 PROBLEM — M48.061 FORAMINAL STENOSIS OF LUMBAR REGION: Chronic | Status: ACTIVE | Noted: 2022-06-08

## 2022-06-08 PROCEDURE — 99214 OFFICE O/P EST MOD 30 MIN: CPT | Performed by: FAMILY MEDICINE

## 2022-06-08 PROCEDURE — G8427 DOCREV CUR MEDS BY ELIG CLIN: HCPCS | Performed by: FAMILY MEDICINE

## 2022-06-08 PROCEDURE — G8510 SCR DEP NEG, NO PLAN REQD: HCPCS | Performed by: FAMILY MEDICINE

## 2022-06-08 PROCEDURE — G8754 DIAS BP LESS 90: HCPCS | Performed by: FAMILY MEDICINE

## 2022-06-08 PROCEDURE — G8417 CALC BMI ABV UP PARAM F/U: HCPCS | Performed by: FAMILY MEDICINE

## 2022-06-08 PROCEDURE — 3017F COLORECTAL CA SCREEN DOC REV: CPT | Performed by: FAMILY MEDICINE

## 2022-06-08 PROCEDURE — 1101F PT FALLS ASSESS-DOCD LE1/YR: CPT | Performed by: FAMILY MEDICINE

## 2022-06-08 PROCEDURE — 1123F ACP DISCUSS/DSCN MKR DOCD: CPT | Performed by: FAMILY MEDICINE

## 2022-06-08 PROCEDURE — G8752 SYS BP LESS 140: HCPCS | Performed by: FAMILY MEDICINE

## 2022-06-08 PROCEDURE — G8536 NO DOC ELDER MAL SCRN: HCPCS | Performed by: FAMILY MEDICINE

## 2022-06-08 RX ORDER — FLUTICASONE PROPIONATE 50 MCG
SPRAY, SUSPENSION (ML) NASAL
Qty: 16 G | Refills: 5 | Status: SHIPPED | OUTPATIENT
Start: 2022-06-08

## 2022-06-08 RX ORDER — AMLODIPINE BESYLATE 2.5 MG/1
2.5 TABLET ORAL DAILY
Qty: 90 TABLET | Refills: 1 | Status: SHIPPED | OUTPATIENT
Start: 2022-06-08

## 2022-06-08 RX ORDER — LEVOTHYROXINE SODIUM 150 MCG
150 TABLET ORAL
Qty: 90 TABLET | Refills: 1 | Status: SHIPPED | OUTPATIENT
Start: 2022-06-08

## 2022-06-08 NOTE — PROGRESS NOTES
1. \"Have you been to the ER, urgent care clinic since your last visit? Hospitalized since your last visit? \" No    2. \"Have you seen or consulted any other health care providers outside of the [address] [place] [address] since your last visit? \" No     3. For patients aged 39-70: Has the patient had a colonoscopy / FIT/ Cologuard? Yes - no Care Gap present      If the patient is female:    4. For patients aged 41-77: Has the patient had a mammogram within the past 2 years? NA - based on age or sex      11. For patients aged 21-65: Has the patient had a pap smear?  NA - based on age or sex    Health Maintenance Due   Topic Date Due    MenB Meningococcal topic (1 of 4 - Increased Risk Bexsero 2-dose series) Never done    Shingrix Vaccine Age 50> (1 of 2) Never done    AAA Screening 73-67 YO Male Smoking Patients  Never done    Pneumococcal 65+ years (3 - PPSV23 or PCV20) 09/01/2020    COVID-19 Vaccine (3 - Booster for Moderna series) 09/06/2021    Medicare Yearly Exam  03/30/2022

## 2022-06-08 NOTE — PROGRESS NOTES
[facility]    History of Present Illness:   Yin Matos is a 81 y.o. male with history of HTN, CVA, Hypothyroidism, DDD, Prostate Cancer, Vitamin D deficiency  CC: Follow up and left back pain  History provided by patient and Records    HPI:  Low back pain: Noting worsened low back pain on the left side. Chronic issue. Not an issue with siting, but worsens after even 5 minutes of standing/walking. Known Lumbar DDD, foraminal stenosis, disc protrusions, from MRI in 2012. Constipation: Noting some issues with constipation still, interested in the Magnesium medication    Hypertension Follow up:  Currently Taking:   Key CAD CHF Meds             amLODIPine (NORVASC) 2.5 mg tablet (Taking) Take 1 Tablet by mouth daily. rosuvastatin (CRESTOR) 10 mg tablet (Taking) TAKE ONE TABLET BY MOUTH NIGHTLY ON MONDAY, WEDNESDAY AND FRIDAY OF EACH WEEK    aspirin (ASPIRIN) 325 mg tablet (Taking) Take 1 Tab by mouth daily. omega-3 fatty acids-vitamin e (FISH OIL) 1,000 mg Cap (Taking) Take 1 Cap by mouth two (2) times a day. The patient reports:  taking medications as instructed, no medication side effects noted, no TIA's, no chest pain on exertion, no dyspnea on exertion, no swelling of ankles, no orthostatic dizziness or lightheadedness, no orthopnea or paroxysmal nocturnal dyspnea. BP Readings from Last 3 Encounters:   06/08/22 128/79   02/08/22 128/77   10/08/21 (!) 140/83       Hypertriglyceridemia Follow up:   Cardiovascular risks for him are: hypertension  hyperlipidemia. Current Medications:  Key Antihyperlipidemia Meds             rosuvastatin (CRESTOR) 10 mg tablet (Taking) TAKE ONE TABLET BY MOUTH NIGHTLY ON MONDAY, WEDNESDAY AND FRIDAY OF EACH WEEK    omega-3 fatty acids-vitamin e (FISH OIL) 1,000 mg Cap (Taking) Take 1 Cap by mouth two (2) times a day.           Compliance: YES   Myalgias: NO   Fatigue: NO   Other side effects: NO     Wt Readings from Last 3 Encounters: 06/08/22 205 lb (93 kg)   02/08/22 209 lb 9.6 oz (95.1 kg)   09/29/21 207 lb (93.9 kg)       Lab Results   Component Value Date/Time    Cholesterol, total 168 02/08/2022 12:00 AM    HDL Cholesterol 60 02/08/2022 12:00 AM    LDL, calculated 94 02/08/2022 12:00 AM    LDL, calculated 92.4 09/29/2021 08:54 AM    VLDL, calculated 14 02/08/2022 12:00 AM    VLDL, calculated 14.6 09/29/2021 08:54 AM    Triglyceride 75 02/08/2022 12:00 AM    CHOL/HDL Ratio 2.8 09/29/2021 08:54 AM      Lab Results   Component Value Date/Time    ALT (SGPT) 17 02/08/2022 12:00 AM    AST (SGOT) 22 02/08/2022 12:00 AM    Alk. phosphatase 69 02/08/2022 12:00 AM    Bilirubin, total 0.6 02/08/2022 12:00 AM      Thyroid Disease Follow up:  Currently takes Levothyroxine 150 mcg. Thyroid medication has been has not been changed since last medication check and labs. Patient denies fatigue, nervousness, weight changes, heat or cold intolerance, bowel changes, skin changes, cardiovascular symptoms, hair loss, feeling excessive energy, tremor, palpitations, and/or weight loss.     Lab Results   Component Value Date/Time    TSH 3.370 02/08/2022 12:00 AM    T4, Free 1.41 02/08/2022 12:00 AM    T4, Total 9.6 11/10/2010 01:21 AM           Health Maintenance  Health Maintenance Due   Topic Date Due    MenB Meningococcal topic (1 of 4 - Increased Risk Bexsero 2-dose series) Never done    Shingrix Vaccine Age 50> (1 of 2) Never done    AAA Screening 73-69 YO Male Smoking Patients  Never done    Pneumococcal 65+ years (3 - PPSV23 or PCV20) 09/01/2020    COVID-19 Vaccine (3 - Booster for Moderna series) 09/06/2021    Medicare Yearly Exam  03/30/2022       Past Medical, Family, and Social History:     Current Outpatient Medications on File Prior to Visit   Medication Sig Dispense Refill    rosuvastatin (CRESTOR) 10 mg tablet TAKE ONE TABLET BY MOUTH NIGHTLY ON MONDAY, WEDNESDAY AND FRIDAY OF EACH WEEK 36 Tablet 1    tadalafiL (CIALIS) 5 mg tablet       DOCUSATE SODIUM PO Take  by mouth.  budesonide (Pulmicort Flexhaler) 180 mcg/actuation aepb inhaler Take 1 Puff by inhalation two (2) times a day. 1 Each 5    diclofenac (VOLTAREN) 1 % gel Apply 1 g to affected area every six (6) hours. Apply to neck, legs 350 g 3    cyclobenzaprine (FLEXERIL) 5 mg tablet Take 1 Tablet by mouth nightly. 15 Tablet 1    albuterol (PROVENTIL HFA, VENTOLIN HFA, PROAIR HFA) 90 mcg/actuation inhaler Take 1 Puff by inhalation every four (4) hours as needed for Wheezing. 1 Inhaler 5    aspirin (ASPIRIN) 325 mg tablet Take 1 Tab by mouth daily.  cyanocobalamin (VITAMIN B-12) 1,000 mcg tablet Take 1,000 mcg by mouth daily.  omega-3 fatty acids-vitamin e (FISH OIL) 1,000 mg Cap Take 1 Cap by mouth two (2) times a day.  doxycycline (MONODOX) 100 mg capsule Take 1 Capsule by mouth two (2) times a day. (Patient not taking: Reported on 6/8/2022) 14 Capsule 0    [DISCONTINUED] Synthroid 150 mcg tablet Take 1 Tablet by mouth Daily (before breakfast). 90 Tablet 1    [DISCONTINUED] amLODIPine (NORVASC) 2.5 mg tablet Take 1 Tablet by mouth daily. 90 Tablet 1    [DISCONTINUED] fluticasone propionate (FLONASE) 50 mcg/actuation nasal spray USE 2 SPRAYS IN EACH NOSTRIL DAILY 16 g 5    sildenafil citrate (VIAGRA) 50 mg tablet Take 1 Tab by mouth as needed. 20 minutes before relations. Max dose once daily. (Patient not taking: Reported on 2/8/2022) 10 Tab 4     No current facility-administered medications on file prior to visit.        Patient Active Problem List   Diagnosis Code    Mixed hyperlipidemia E78.2    Benign neoplasm of colon D12.6    Keloid L91.0    Internal hemorrhoid K64.8    Thyroglossal duct cyst Q89.2    Hypothyroidism E03.9    Hypertension I10    History of CVA (cerebrovascular accident) Z80.78    Spinal stenosis of cervical region M48.02    Pain R52    Shoulder-hand syndrome M89.09    Weakness R53.1    Poor historian Z78.9    Chronic midline low back pain with left-sided sciatica M54.42, G89.29    History of prostate cancer Z85.46    Foraminal stenosis of lumbar region M48.061       Social History     Socioeconomic History    Marital status:    Tobacco Use    Smoking status: Former Smoker     Packs/day: 0.25     Years: 40.00     Pack years: 10.00     Types: Cigarettes     Quit date: 2015     Years since quittin.3    Smokeless tobacco: Never Used    Tobacco comment: smokes 1/2 pack week   Substance and Sexual Activity    Alcohol use: Yes     Alcohol/week: 5.0 standard drinks     Types: 6 Cans of beer per week     Comment: moderately    Drug use: No    Sexual activity: Yes        Review of Systems   Review of Systems   Constitutional: Negative for chills and fever. Cardiovascular: Negative for chest pain and palpitations. Musculoskeletal: Positive for back pain. Neurological: Negative for dizziness and headaches. Objective:     Visit Vitals  /79 (BP 1 Location: Right arm, BP Patient Position: Sitting, BP Cuff Size: Adult)   Pulse 60   Temp 98.3 °F (36.8 °C) (Oral)   Resp 18   Ht 5' 11\" (1.803 m)   Wt 205 lb (93 kg)   SpO2 97%   BMI 28.59 kg/m²        Physical Exam  Vitals and nursing note reviewed. Constitutional:       Appearance: Normal appearance. HENT:      Head: Normocephalic and atraumatic. Cardiovascular:      Rate and Rhythm: Normal rate and regular rhythm. Pulses: Normal pulses. Heart sounds: Normal heart sounds. No murmur heard. No friction rub. No gallop. Pulmonary:      Effort: Pulmonary effort is normal.      Breath sounds: Normal breath sounds. Abdominal:      General: Abdomen is flat. Bowel sounds are normal.      Palpations: Abdomen is soft. Musculoskeletal:         General: Normal range of motion. Cervical back: Normal range of motion and neck supple. Neurological:      Mental Status: He is alert.          Pertinent Labs/Studies:      Assessment and orders: ICD-10-CM ICD-9-CM    1. Chronic midline low back pain with left-sided sciatica  M54.42 724.2 REFERRAL TO PHYSICAL THERAPY    G89.29 724.3      338.29    2. Essential hypertension  I10 401.9 amLODIPine (NORVASC) 2.5 mg tablet      CBC W/O DIFF      METABOLIC PANEL, COMPREHENSIVE   3. Hypothyroidism due to acquired atrophy of thyroid  E03.4 244.8 Synthroid 150 mcg tablet     246.8 T4, FREE      TSH 3RD GENERATION   4. Mixed hyperlipidemia  E78.2 272.2 LIPID PANEL   5. History of CVA (cerebrovascular accident)  Z86.73 V12.54    6. Environmental allergies  Z91.09 V15.09 fluticasone propionate (FLONASE) 50 mcg/actuation nasal spray     Diagnoses and all orders for this visit:    1. Chronic midline low back pain with left-sided sciatica: Wants to avoid medications  -     REFERRAL TO PHYSICAL THERAPY    2. Essential hypertension: Our goal is to normalize the blood pressure to decrease the risks of strokes and heart attacks. The patient is in agreement with the plan. -     amLODIPine (NORVASC) 2.5 mg tablet; Take 1 Tablet by mouth daily.  -     CBC W/O DIFF; Future  -     METABOLIC PANEL, COMPREHENSIVE; Future    3. Hypothyroidism due to acquired atrophy of thyroid: Labs  -     Synthroid 150 mcg tablet; Take 1 Tablet by mouth Daily (before breakfast). -     T4, FREE; Future  -     TSH 3RD GENERATION; Future    4. Mixed hyperlipidemia: The patient is aware of our goal to reduce or eliminate the long term problems (such as strokes and heart attacks) related to poorly controlled Triglycerides, LDL, Cholesterol.   -     LIPID PANEL; Future    5. History of CVA (cerebrovascular accident)    6. Environmental allergies  -     fluticasone propionate (FLONASE) 50 mcg/actuation nasal spray; USE 2 SPRAYS IN EACH NOSTRIL DAILY      Follow-up and Dispositions    · Return in about 4 weeks (around 7/6/2022) for Follow up [address] and back pain.            I have discussed the diagnosis with the patient and the intended plan as seen in the above orders. Social history, medical history, and labs were reviewed. The patient has received an after-visit summary and questions were answered concerning future plans. I have discussed medication side effects and warnings with the patient as well.     MD GENNY Velazco & ROSALINDA LAWS [facility]  06/08/22

## 2022-06-09 LAB
ALBUMIN SERPL-MCNC: 4 G/DL (ref 3.5–5)
ALBUMIN/GLOB SERPL: 1.1 {RATIO} (ref 1.1–2.2)
ALP SERPL-CCNC: 71 U/L (ref 45–117)
ALT SERPL-CCNC: 26 U/L (ref 12–78)
ANION GAP SERPL CALC-SCNC: 4 MMOL/L (ref 5–15)
AST SERPL-CCNC: 28 U/L (ref 15–37)
BILIRUB SERPL-MCNC: 0.9 MG/DL (ref 0.2–1)
BUN SERPL-MCNC: 16 MG/DL (ref 6–20)
BUN/CREAT SERPL: 19 (ref 12–20)
CALCIUM SERPL-MCNC: 10.4 MG/DL (ref 8.5–10.1)
CHLORIDE SERPL-SCNC: 111 MMOL/L (ref 97–108)
CHOLEST SERPL-MCNC: 178 MG/DL
CO2 SERPL-SCNC: 25 MMOL/L (ref 21–32)
CREAT SERPL-MCNC: 0.84 MG/DL (ref 0.7–1.3)
ERYTHROCYTE [DISTWIDTH] IN BLOOD BY AUTOMATED COUNT: 13.8 % (ref 11.5–14.5)
GLOBULIN SER CALC-MCNC: 3.8 G/DL (ref 2–4)
GLUCOSE SERPL-MCNC: 95 MG/DL (ref 65–100)
HCT VFR BLD AUTO: 42.6 % (ref 36.6–50.3)
HDLC SERPL-MCNC: 74 MG/DL
HDLC SERPL: 2.4 {RATIO} (ref 0–5)
HGB BLD-MCNC: 13.2 G/DL (ref 12.1–17)
LDLC SERPL CALC-MCNC: 89.6 MG/DL (ref 0–100)
MCH RBC QN AUTO: 30.3 PG (ref 26–34)
MCHC RBC AUTO-ENTMCNC: 31 G/DL (ref 30–36.5)
MCV RBC AUTO: 97.7 FL (ref 80–99)
NRBC # BLD: 0 K/UL (ref 0–0.01)
NRBC BLD-RTO: 0 PER 100 WBC
PLATELET # BLD AUTO: 228 K/UL (ref 150–400)
PMV BLD AUTO: 11.2 FL (ref 8.9–12.9)
POTASSIUM SERPL-SCNC: 4.2 MMOL/L (ref 3.5–5.1)
PROT SERPL-MCNC: 7.8 G/DL (ref 6.4–8.2)
RBC # BLD AUTO: 4.36 M/UL (ref 4.1–5.7)
SODIUM SERPL-SCNC: 140 MMOL/L (ref 136–145)
T4 FREE SERPL-MCNC: 1.2 NG/DL (ref 0.8–1.5)
TRIGL SERPL-MCNC: 72 MG/DL (ref ?–150)
TSH SERPL DL<=0.05 MIU/L-ACNC: 0.27 UIU/ML (ref 0.36–3.74)
VLDLC SERPL CALC-MCNC: 14.4 MG/DL
WBC # BLD AUTO: 5.8 K/UL (ref 4.1–11.1)

## 2022-06-14 ENCOUNTER — TELEPHONE (OUTPATIENT)
Dept: FAMILY MEDICINE CLINIC | Age: 72
End: 2022-06-14

## 2022-06-14 NOTE — TELEPHONE ENCOUNTER
----- Message from [facility] sent at 6/14/2022 11:42 AM EDT -----  Subject: Message to Provider    QUESTIONS  Information for Provider? He received the letter to call the office to   update Dr. Marivel Winchester. Patient wanted to inform Dr. Marivel Winchester that he just   left the first appointment for therapy.   ---------------------------------------------------------------------------  --------------  [address]  What is the best way for the office to contact you? OK to leave message on   voicemail  Preferred Call Back Phone Number? [phone]  ---------------------------------------------------------------------------  --------------  SCRIPT ANSWERS  Relationship to Patient?  Self

## 2022-07-06 ENCOUNTER — OFFICE VISIT (OUTPATIENT)
Dept: FAMILY MEDICINE CLINIC | Age: 72
End: 2022-07-06
Payer: MEDICARE

## 2022-07-06 VITALS
RESPIRATION RATE: 20 BRPM | HEIGHT: 71 IN | SYSTOLIC BLOOD PRESSURE: 128 MMHG | BODY MASS INDEX: 29.68 KG/M2 | WEIGHT: 212 LBS | OXYGEN SATURATION: 98 % | DIASTOLIC BLOOD PRESSURE: 73 MMHG | HEART RATE: 59 BPM | TEMPERATURE: 97.6 F

## 2022-07-06 DIAGNOSIS — Z13.6 SCREENING FOR AAA (ABDOMINAL AORTIC ANEURYSM): ICD-10-CM

## 2022-07-06 DIAGNOSIS — Z00.00 MEDICARE ANNUAL WELLNESS VISIT, SUBSEQUENT: Primary | ICD-10-CM

## 2022-07-06 DIAGNOSIS — J41.0 SIMPLE CHRONIC BRONCHITIS (HCC): ICD-10-CM

## 2022-07-06 DIAGNOSIS — E03.4 HYPOTHYROIDISM DUE TO ACQUIRED ATROPHY OF THYROID: Chronic | ICD-10-CM

## 2022-07-06 DIAGNOSIS — Z87.891 PERSONAL HISTORY OF TOBACCO USE, PRESENTING HAZARDS TO HEALTH: ICD-10-CM

## 2022-07-06 PROCEDURE — 1123F ACP DISCUSS/DSCN MKR DOCD: CPT | Performed by: FAMILY MEDICINE

## 2022-07-06 PROCEDURE — G8752 SYS BP LESS 140: HCPCS | Performed by: FAMILY MEDICINE

## 2022-07-06 PROCEDURE — G8536 NO DOC ELDER MAL SCRN: HCPCS | Performed by: FAMILY MEDICINE

## 2022-07-06 PROCEDURE — G8510 SCR DEP NEG, NO PLAN REQD: HCPCS | Performed by: FAMILY MEDICINE

## 2022-07-06 PROCEDURE — 1101F PT FALLS ASSESS-DOCD LE1/YR: CPT | Performed by: FAMILY MEDICINE

## 2022-07-06 PROCEDURE — G8417 CALC BMI ABV UP PARAM F/U: HCPCS | Performed by: FAMILY MEDICINE

## 2022-07-06 PROCEDURE — G0439 PPPS, SUBSEQ VISIT: HCPCS | Performed by: FAMILY MEDICINE

## 2022-07-06 PROCEDURE — G8427 DOCREV CUR MEDS BY ELIG CLIN: HCPCS | Performed by: FAMILY MEDICINE

## 2022-07-06 PROCEDURE — G8754 DIAS BP LESS 90: HCPCS | Performed by: FAMILY MEDICINE

## 2022-07-06 PROCEDURE — 3017F COLORECTAL CA SCREEN DOC REV: CPT | Performed by: FAMILY MEDICINE

## 2022-07-06 RX ORDER — BUDESONIDE 180 UG/1
1 AEROSOL, POWDER RESPIRATORY (INHALATION) 2 TIMES DAILY
Qty: 1 EACH | Refills: 5 | Status: CANCELLED | OUTPATIENT
Start: 2022-07-06

## 2022-07-06 NOTE — PROGRESS NOTES
This is the Subsequent Medicare Annual Wellness Exam, performed 12 months or more after the Initial AWV or the last Subsequent AWV    I have reviewed the patient's medical history in detail and updated the computerized patient record. Assessment/Plan   Education and counseling provided:  Are appropriate based on today's review and evaluation    1. Medicare annual wellness visit, subsequent  2. Simple chronic bronchitis ([address]  3. Hypothyroidism due to acquired atrophy of thyroid  4. Screening for AAA (abdominal aortic aneurysm)  -     US EXAM SCREENING AAA; Future  5. Personal history of tobacco use, presenting hazards to health  -     [address] AAA; Future       Depression Risk Factor Screening     3 most recent PHQ Screens 7/6/2022   Little interest or pleasure in doing things Not at all   Feeling down, depressed, irritable, or hopeless Not at all   Total Score PHQ 2 0       Alcohol & Drug Abuse Risk Screen    Do you average more than 1 drink per night or more than 7 drinks a week: No    In the past three months have you have had more than 4 drinks containing alcohol on one occasion: No          Functional Ability and Level of Safety    Hearing: The patient wears hearing aids. Activities of Daily Living: The home contains: no safety equipment. Patient does total self care      Ambulation: with no difficulty     Fall Risk:  Fall Risk Assessment, last 12 mths 7/6/2022   Able to walk? Yes   Fall in past 12 months? 0   Do you feel unsteady?  0   Are you worried about falling 0      Abuse Screen:  Patient is not abused       Cognitive Screening    Has your family/caregiver stated any concerns about your memory: no     Cognitive Screening: Normal - MMSE (Mini Mental Status Exam)    Health Maintenance Due     Health Maintenance Due   Topic Date Due    MenB Meningococcal topic (1 of 4 - Increased Risk Bexsero 2-dose series) Never done    Shingrix Vaccine Age 50> (1 of 2) Never done    AAA Screening 73-69 YO Male Smoking Patients  Never done    Pneumococcal 65+ years (3 - PPSV23 or PCV20) 09/01/2020       Patient Care Team   Patient Care Team:  Ezio Vincent MD as PCP - General (Family Medicine)  Ezio Vincent MD as PCP - [address] [place] [address]  [facility] Provider  Delano Carranza MD (Orthopedic Surgery)  Jaren Azevedo MD as Physician (Gastroenterology)  Jeri Buchanan, OD (Optometry)    History     Patient Active Problem List   Diagnosis Code    Mixed hyperlipidemia E78.2    Benign neoplasm of colon D12.6    Keloid L91.0    Internal hemorrhoid K64.8    Thyroglossal duct cyst Q89.2    Hypothyroidism E03.9    Hypertension I10    History of CVA (cerebrovascular accident) Z86.73    Spinal stenosis of cervical region M48.02    Pain R52    Shoulder-hand syndrome M89.09    Weakness R53.1    Poor historian Z78.9    Chronic midline low back pain with left-sided sciatica M54.42, G89.29    History of prostate cancer Z85.46    Foraminal stenosis of lumbar region M48.061     Past Medical History:   Diagnosis Date    Benign neoplasm of colon 4/27/2010    Cancer ([facility]     PROSTATE    HTN (hypertension) 11/11/2014    Hypercholesterolemia     Internal hemorrhoid 4/27/2010    Keloid 4/27/2010    Mixed hyperlipidemia 4/27/2010    Neck mass 4/27/2010    Other ill-defined conditions(799.89)     HI CHOLESTEROL    Prostate cancer ([facility] [address] 4/17/2011    Shoulder-hand syndrome     Smoker     Stroke [facility])     Per pt 2014    Thyroid disease     HYPO    Tobacco use disorder 4/27/2010      Past Surgical History:   Procedure Laterality Date    HX CERVICAL FUSION      HX GI      COLONOSCOPY    HX OTHER SURGICAL      KELOIDS--BACK    HX PROSTATECTOMY      PROSTATE BIOPSY    HX SPLENECTOMY  1969    adhesions----1997     Current Outpatient Medications   Medication Sig Dispense Refill    amLODIPine (NORVASC) 2.5 mg tablet Take 1 Tablet by mouth daily.  90 Tablet 1    Synthroid 150 mcg tablet Take 1 Tablet by mouth Daily (before breakfast). 90 Tablet 1    fluticasone propionate (FLONASE) 50 mcg/actuation nasal spray USE 2 SPRAYS IN EACH NOSTRIL DAILY 16 g 5    rosuvastatin (CRESTOR) 10 mg tablet TAKE ONE TABLET BY MOUTH NIGHTLY ON MONDAY, WEDNESDAY AND FRIDAY OF EACH WEEK 36 Tablet 1    tadalafiL (CIALIS) 5 mg tablet       DOCUSATE SODIUM PO Take 100 mg by mouth daily.  budesonide (Pulmicort Flexhaler) 180 mcg/actuation aepb inhaler Take 1 Puff by inhalation two (2) times a day. 1 Each 5    diclofenac (VOLTAREN) 1 % gel Apply 1 g to affected area every six (6) hours. Apply to neck, legs 350 g 3    aspirin (ASPIRIN) 325 mg tablet Take 1 Tab by mouth daily.  cyanocobalamin (VITAMIN B-12) 1,000 mcg tablet Take 1,000 mcg by mouth daily.  omega-3 fatty acids-vitamin e (FISH OIL) 1,000 mg Cap Take 1 Cap by mouth two (2) times a day. Allergies   Allergen Reactions    Lipitor [Atorvastatin] Other (comments)     Other reaction(s): Cramps  Myalgia      Simvastatin Other (comments)     Other reaction(s): Cramps  myalgia         Family History   Problem Relation Age of Onset    Diabetes Father     Asthma Sister     Alcohol abuse Neg Hx     Arthritis-rheumatoid Neg Hx     Bleeding Prob Neg Hx     Cancer Neg Hx     Elevated Lipids Neg Hx     Headache Neg Hx     Heart Disease Neg Hx     Hypertension Neg Hx     Lung Disease Neg Hx     Migraines Neg Hx     Psychiatric Disorder Neg Hx     Stroke Neg Hx     Mental Retardation Neg Hx      Social History     Tobacco Use    Smoking status: Former Smoker     Packs/day: 0.25     Years: 40.00     Pack years: 10.00     Types: Cigarettes     Quit date: 2015     Years since quittin.4    Smokeless tobacco: Never Used    Tobacco comment: smokes 1/2 pack week   Substance Use Topics    Alcohol use:  Yes     Alcohol/week: 5.0 standard drinks     Types: 6 Cans of beer per week     Comment: shelbie Aden MD

## 2022-07-06 NOTE — PATIENT INSTRUCTIONS
Medicare Wellness Visit, Male    The best way to live healthy is to have a lifestyle where you eat a well-balanced diet, exercise regularly, limit alcohol use, and quit all forms of tobacco/nicotine, if applicable. Regular preventive services are another way to keep healthy. Preventive services (vaccines, screening tests, monitoring & exams) can help personalize your care plan, which helps you manage your own care. Screening tests can find health problems at the earliest stages, when they are easiest to treat. [facility] follows the current, evidence-based guidelines published by the [facility] ([facility]) when recommending preventive services for our patients. Because we follow these guidelines, sometimes recommendations change over time as research supports it. (For example, a prostate screening blood test is no longer routinely recommended for men with no symptoms). Of course, you and your doctor may decide to screen more often for some diseases, based on your risk and co-morbidities (chronic disease you are already diagnosed with). Preventive services for you include:  - Medicare offers their members a free annual wellness visit, which is time for you and your primary care provider to discuss and plan for your preventive service needs. Take advantage of this benefit every year!  -All adults over age 72 should receive the recommended pneumonia vaccines. Current USPSTF guidelines recommend a series of two vaccines for the best pneumonia protection.   -All adults should have a flu vaccine yearly and tetanus vaccine every 10 years.  -All adults age 48 and older should receive the shingles vaccines (series of two vaccines).        -All adults age 38-68 who are overweight should have a diabetes screening test once every three years.   -Other screening tests & preventive services for persons with diabetes include: an eye exam to screen for diabetic retinopathy, a kidney function test, a foot exam, and stricter control over your cholesterol.   -Cardiovascular screening for adults with routine risk involves an electrocardiogram (ECG) at intervals determined by the provider.   -Colorectal cancer screening should be done for adults age 54-65 with no increased risk factors for colorectal cancer. There are a number of acceptable methods of screening for this type of cancer. Each test has its own benefits and drawbacks. Discuss with your provider what is most appropriate for you during your annual wellness visit. The different tests include: colonoscopy (considered the best screening method), a fecal occult blood test, a fecal DNA test, and sigmoidoscopy.  -All adults born between [facility] should be screened once for Hepatitis C.  -An Abdominal Aortic Aneurysm (AAA) Screening is recommended for men age 73-68 who has ever smoked in their lifetime.      Here is a list of your current Health Maintenance items (your personalized list of preventive services) with a due date:  Health Maintenance Due   Topic Date Due    Meningococcal (1 of 4 - Increased Risk Bexsero 2-dose series) Never done    Shingles Vaccine (1 of 2) Never done    AAA Screening  Never done    Pneumococcal Vaccine (3 - PPSV23 or PCV20) 09/01/2020     Medicare provides coverage of a one-time preventive ultrasound screening for the early detection of an AAA for eligible beneficiaries who meet the following criteria:      The beneficiary receives a referral for an ultrasound screening for AAA as a result of an IPPE;   The beneficiary receives a referral from a provider or supplier who is authorized to provide covered ultrasound diagnostic services   The beneficiary has not been previously furnished an ultrasound screening for AAA under the Medicare Program  The beneficiary is included in at least one of the following risk categories:      The beneficiary has a family history of AAAs   The beneficiary is a man 72 through 76years of age who has smoked at least 100 cigarettes in his lifetime; or   The beneficiary manifests other risk factors in a beneficiary category recommended for ultrasound screening by the [place] [facility] (USPSTF) regarding AAAs, as specified by the [place] of Health and [employer] through the national coverage determination process. Important Note  Only Medicare beneficiaries who receive a referral for the ultrasound screening for AAA as a result of the IPPE will be covered for this benefit. Medicare provides coverage for the ultrasound screening for AAA as a Medicare Part B benefit. The coinsurance or copayment applies to this benefit. The Medicare Part B deductible is waived. For dates of service on or after January 1, 2011, both the coinsurance or copayment and deductible are waived. NOTE: The Medicare Part B deductible does not apply to [facility]) services.

## 2022-07-06 NOTE — PROGRESS NOTES
Chief Complaint   Patient presents with    Annual Wellness Visit    Eye Problem     left eye redness, raised area. 1. \"Have you been to the ER, urgent care clinic since your last visit? Hospitalized since your last visit? \" No    2. \"Have you seen or consulted any other health care providers outside of the [address] [place] [address] since your last visit? \" No     3. For patients aged 39-70: Has the patient had a colonoscopy / FIT/ Cologuard? Yes - no Care Gap present      If the patient is female:    4. For patients aged 41-77: Has the patient had a mammogram within the past 2 years? NA - based on age or sex      11. For patients aged 21-65: Has the patient had a pap smear?  NA - based on age or sex    Health Maintenance Due   Topic Date Due    MenB Meningococcal topic (1 of 4 - Increased Risk Bexsero 2-dose series) Never done    Shingrix Vaccine Age 50> (1 of 2) Never done    AAA Screening 73-67 YO Male Smoking Patients  Never done    Pneumococcal 65+ years (3 - PPSV23 or PCV20) 09/01/2020    COVID-19 Vaccine (3 - Booster for Moderna series) 09/06/2021    Medicare Yearly Exam  03/30/2022

## 2022-07-20 ENCOUNTER — HOSPITAL ENCOUNTER (OUTPATIENT)
Dept: ULTRASOUND IMAGING | Age: 72
Discharge: HOME | End: 2022-07-20
Attending: FAMILY MEDICINE
Payer: MEDICARE

## 2022-07-20 DIAGNOSIS — Z13.6 SCREENING FOR AAA (ABDOMINAL AORTIC ANEURYSM): ICD-10-CM

## 2022-07-20 DIAGNOSIS — Z87.891 PERSONAL HISTORY OF TOBACCO USE, PRESENTING HAZARDS TO HEALTH: ICD-10-CM

## 2022-07-20 PROCEDURE — 76706 US ABDL AORTA SCREEN AAA: CPT

## 2022-08-11 ENCOUNTER — TELEPHONE (OUTPATIENT)
Dept: FAMILY MEDICINE CLINIC | Age: 72
End: 2022-08-11

## 2022-08-11 NOTE — TELEPHONE ENCOUNTER
Patient called regarding question about ultrasound. Stated he had forgotten having gotten the abdominal ultrasound done. Thanked for information.

## 2022-09-21 ENCOUNTER — TELEPHONE (OUTPATIENT)
Dept: FAMILY MEDICINE CLINIC | Age: 72
End: 2022-09-21

## 2022-09-30 ENCOUNTER — TELEPHONE (OUTPATIENT)
Dept: FAMILY MEDICINE CLINIC | Age: 72
End: 2022-09-30

## 2022-09-30 NOTE — TELEPHONE ENCOUNTER
Patient called and stated going to pharmacy to get flu shot. Wanting to know if he can get high dose. Notified that due to age he will get high dose flu shot. Verbalizes understanding. Thanked for information.

## 2022-10-11 ENCOUNTER — OFFICE VISIT (OUTPATIENT)
Dept: FAMILY MEDICINE CLINIC | Age: 72
End: 2022-10-11
Payer: MEDICARE

## 2022-10-11 VITALS
HEART RATE: 58 BPM | RESPIRATION RATE: 18 BRPM | TEMPERATURE: 97.7 F | WEIGHT: 211.2 LBS | DIASTOLIC BLOOD PRESSURE: 89 MMHG | HEIGHT: 71 IN | OXYGEN SATURATION: 95 % | BODY MASS INDEX: 29.57 KG/M2 | SYSTOLIC BLOOD PRESSURE: 161 MMHG

## 2022-10-11 DIAGNOSIS — Z86.73 HISTORY OF CVA (CEREBROVASCULAR ACCIDENT): ICD-10-CM

## 2022-10-11 DIAGNOSIS — E78.2 MIXED HYPERLIPIDEMIA: Chronic | ICD-10-CM

## 2022-10-11 DIAGNOSIS — D17.0 LIPOMA OF NECK: ICD-10-CM

## 2022-10-11 DIAGNOSIS — G89.29 CHRONIC MIDLINE LOW BACK PAIN WITH LEFT-SIDED SCIATICA: Chronic | ICD-10-CM

## 2022-10-11 DIAGNOSIS — M54.42 CHRONIC MIDLINE LOW BACK PAIN WITH LEFT-SIDED SCIATICA: Chronic | ICD-10-CM

## 2022-10-11 DIAGNOSIS — I10 PRIMARY HYPERTENSION: Primary | Chronic | ICD-10-CM

## 2022-10-11 DIAGNOSIS — E03.4 HYPOTHYROIDISM DUE TO ACQUIRED ATROPHY OF THYROID: Chronic | ICD-10-CM

## 2022-10-11 PROCEDURE — G8510 SCR DEP NEG, NO PLAN REQD: HCPCS | Performed by: FAMILY MEDICINE

## 2022-10-11 PROCEDURE — 99214 OFFICE O/P EST MOD 30 MIN: CPT | Performed by: FAMILY MEDICINE

## 2022-10-11 PROCEDURE — G8536 NO DOC ELDER MAL SCRN: HCPCS | Performed by: FAMILY MEDICINE

## 2022-10-11 PROCEDURE — G8754 DIAS BP LESS 90: HCPCS | Performed by: FAMILY MEDICINE

## 2022-10-11 PROCEDURE — G8753 SYS BP > OR = 140: HCPCS | Performed by: FAMILY MEDICINE

## 2022-10-11 PROCEDURE — G8427 DOCREV CUR MEDS BY ELIG CLIN: HCPCS | Performed by: FAMILY MEDICINE

## 2022-10-11 PROCEDURE — 1123F ACP DISCUSS/DSCN MKR DOCD: CPT | Performed by: FAMILY MEDICINE

## 2022-10-11 PROCEDURE — G8417 CALC BMI ABV UP PARAM F/U: HCPCS | Performed by: FAMILY MEDICINE

## 2022-10-11 PROCEDURE — 1101F PT FALLS ASSESS-DOCD LE1/YR: CPT | Performed by: FAMILY MEDICINE

## 2022-10-11 PROCEDURE — 3017F COLORECTAL CA SCREEN DOC REV: CPT | Performed by: FAMILY MEDICINE

## 2022-10-11 NOTE — PROGRESS NOTES
[facility]    History of Present Illness:   Lupie Alpers is a 81 y.o. male with history of  HTN, CVA, Hypothyroidism, DDD, Prostate Cancer, Vitamin D deficiency  CC: Follow up and left back Pain  History provided by patient and Records    HPI:  Hypertension Follow up:  Currently Taking:   Key CAD CHF Meds               rosuvastatin (CRESTOR) 10 mg tablet (Taking) TAKE ONE TABLET BY MOUTH NIGHTLY ON MONDAY, WEDNESDAY AND FRIDAY OF EACH WEEK    amLODIPine (NORVASC) 2.5 mg tablet (Taking) Take 1 Tablet by mouth daily. aspirin (ASPIRIN) 325 mg tablet (Taking) Take 1 Tab by mouth daily. omega-3 fatty acids-vitamin e 1,000 mg cap (Taking) Take 1 Cap by mouth two (2) times a day. The patient reports:  taking medications as instructed, no medication side effects noted, home BP monitoring in range of 665'A systolic over 04'N diastolic, no TIA's, no chest pain on exertion, no dyspnea on exertion, no swelling of ankles, no orthostatic dizziness or lightheadedness, no orthopnea or paroxysmal nocturnal dyspnea. BP Readings from Last 3 Encounters:   10/11/22 (!) 161/89   07/06/22 128/73   06/08/22 128/79      Low Back Pain: Back Pain has improved with Physical therapy overall, still present, but maintaining with home exercise for now. Hypertriglyceridemia Follow up:   Cardiovascular risks for him are: hypertension  hyperlipidemia. Current Medications:  Key Antihyperlipidemia Meds               rosuvastatin (CRESTOR) 10 mg tablet (Taking) TAKE ONE TABLET BY MOUTH NIGHTLY ON MONDAY, WEDNESDAY AND FRIDAY OF EACH WEEK    omega-3 fatty acids-vitamin e 1,000 mg cap (Taking) Take 1 Cap by mouth two (2) times a day.             Compliance: YES   Myalgias: NO   Fatigue: NO   Other side effects: NO     Wt Readings from Last 3 Encounters:   10/11/22 211 lb 3.2 oz (95.8 kg)   07/06/22 212 lb (96.2 kg)   06/08/22 205 lb (93 kg)       Lab Results   Component Value Date/Time    Cholesterol, total 178 06/08/2022 08:52 AM    HDL Cholesterol 74 06/08/2022 08:52 AM    LDL, calculated 89.6 06/08/2022 08:52 AM    VLDL, calculated 14.4 06/08/2022 08:52 AM    Triglyceride 72 06/08/2022 08:52 AM    CHOL/HDL Ratio 2.4 06/08/2022 08:52 AM      Lab Results   Component Value Date/Time    ALT (SGPT) 26 06/08/2022 08:52 AM    AST (SGOT) 28 06/08/2022 08:52 AM    Alk. phosphatase 71 06/08/2022 08:52 AM    Bilirubin, total 0.9 06/08/2022 08:52 AM      Hx of CVA: Patient is getting Carotid Dopplers in November. Mass on the back of he Neck:  Noting chronic neck mass, no significant changes recently, no previous drainage. Health Maintenance  Health Maintenance Due   Topic Date Due    MenB Meningococcal topic (1 of 4 - Increased Risk Bexsero 2-dose series) Never done    Shingrix Vaccine Age 50> (1 of 2) Never done    Pneumococcal 65+ years (3 - PPSV23 or PCV20) 09/01/2020    Flu Vaccine (1) 08/01/2022       Past Medical, Family, and Social History:     Current Outpatient Medications on File Prior to Visit   Medication Sig Dispense Refill    rosuvastatin (CRESTOR) 10 mg tablet TAKE ONE TABLET BY MOUTH NIGHTLY ON MONDAY, WEDNESDAY AND FRIDAY OF EACH WEEK 36 Tablet 1    amLODIPine (NORVASC) 2.5 mg tablet Take 1 Tablet by mouth daily. 90 Tablet 1    Synthroid 150 mcg tablet Take 1 Tablet by mouth Daily (before breakfast). 90 Tablet 1    fluticasone propionate (FLONASE) 50 mcg/actuation nasal spray USE 2 SPRAYS IN EACH NOSTRIL DAILY 16 g 5    tadalafiL (CIALIS) 5 mg tablet       DOCUSATE SODIUM PO Take 100 mg by mouth daily. budesonide (Pulmicort Flexhaler) 180 mcg/actuation aepb inhaler Take 1 Puff by inhalation two (2) times a day. 1 Each 5    diclofenac (VOLTAREN) 1 % gel Apply 1 g to affected area every six (6) hours. Apply to neck, legs 350 g 3    aspirin (ASPIRIN) 325 mg tablet Take 1 Tab by mouth daily. cyanocobalamin 1,000 mcg tablet Take 1,000 mcg by mouth daily.       omega-3 fatty acids-vitamin e 1,000 mg cap Take 1 Cap by mouth two (2) times a day. No current facility-administered medications on file prior to visit. Patient Active Problem List   Diagnosis Code    Mixed hyperlipidemia E78.2    Benign neoplasm of colon D12.6    Keloid L91.0    Internal hemorrhoid K64.8    Thyroglossal duct cyst Q89.2    Hypothyroidism E03.9    Hypertension I10    History of CVA (cerebrovascular accident) Z86.73    Spinal stenosis of cervical region M48.02    Pain R52    Shoulder-hand syndrome M89.09    Weakness R53.1    Poor historian Z78.9    Chronic midline low back pain with left-sided sciatica M54.42, G89.29    History of prostate cancer Z85.46    Foraminal stenosis of lumbar region M48.061       Social History     Socioeconomic History    Marital status:    Tobacco Use    Smoking status: Former     Packs/day: 0.25     Years: 40.00     Pack years: 10.00     Types: Cigarettes     Quit date: 2015     Years since quittin.6    Smokeless tobacco: Never    Tobacco comments:     smokes 1/2 pack week   Substance and Sexual Activity    Alcohol use: Yes     Alcohol/week: 5.0 standard drinks     Types: 6 Cans of beer per week     Comment: moderately    Drug use: No    Sexual activity: Yes     Social Determinants of Health     Financial Resource Strain: Low Risk     Difficulty of Paying Living Expenses: Not hard at all   Food Insecurity: No Food Insecurity    Worried About Running Out of Food in the Last Year: Never true    Ran Out of Food in the Last Year: Never true        Review of Systems   Review of Systems   Constitutional:  Negative for chills and fever. Cardiovascular:  Negative for chest pain and palpitations. Gastrointestinal:  Positive for abdominal pain and constipation. Negative for nausea and vomiting. Neurological:  Negative for dizziness and headaches.      Objective:   Visit Vitals  BP (!) 161/89 (BP 1 Location: Right arm, BP Patient Position: Sitting, BP Cuff Size: Adult)   Pulse (!) 58   Temp 97.7 °F (36.5 °C) (Oral)   Resp 18   Ht 5' 11\" (1.803 m)   Wt 211 lb 3.2 oz (95.8 kg)   SpO2 95%   BMI 29.46 kg/m²        Physical Exam  Vitals reviewed. Constitutional:       Appearance: Normal appearance. HENT:      Head: Normocephalic and atraumatic. Cardiovascular:      Rate and Rhythm: Normal rate and regular rhythm. Pulses: Normal pulses. Heart sounds: Normal heart sounds. No murmur heard. No friction rub. No gallop. Pulmonary:      Effort: Pulmonary effort is normal.      Breath sounds: Normal breath sounds. Abdominal:      General: Abdomen is flat. Bowel sounds are normal.      Palpations: Abdomen is soft. Musculoskeletal:      Cervical back: Normal range of motion and neck supple. Skin:     Comments: Lipoma of the back of the left neck. Mobile, spongy non tender mass, large. Neurological:      Mental Status: He is alert. Pertinent Labs/Studies:      Assessment and orders:       ICD-10-CM ICD-9-CM    1. Primary hypertension  I10 401.9 CBC W/O DIFF      METABOLIC PANEL, COMPREHENSIVE      2. Hypothyroidism due to acquired atrophy of thyroid  E03.4 244.8 T4, FREE     246.8 TSH 3RD GENERATION      3. Chronic midline low back pain with left-sided sciatica  M54.42 724.2     G89.29 724.3      338.29       4. Mixed hyperlipidemia  E78.2 272.2 LIPID PANEL      5. History of CVA (cerebrovascular accident)  Z86.73 V12.54       6. Lipoma of neck  D17.0 214.1         Diagnoses and all orders for this visit:    1. Primary hypertension: Our goal is to normalize the blood pressure to decrease the risks of strokes and heart attacks. The patient is in agreement with the plan. -     CBC W/O DIFF; Future  -     METABOLIC PANEL, COMPREHENSIVE; Future    2. Hypothyroidism due to acquired atrophy of thyroid:  Labs  -     T4, FREE; Future  -     TSH 3RD GENERATION; Future    3. Chronic midline low back pain with left-sided sciatica    4.  Mixed hyperlipidemia: The patient is aware of our goal to reduce or eliminate the long term problems (such as strokes and heart attacks) related to poorly controlled Triglycerides, LDL, Cholesterol.   -     LIPID PANEL; Future    5. History of CVA (cerebrovascular accident): Upcoming Carotid Doppler. Follow-up and Dispositions    Return in about 3 months (around 1/11/2023). I have discussed the diagnosis with the patient and the intended plan as seen in the above orders. Social history, medical history, and labs were reviewed. The patient has received an after-visit summary and questions were answered concerning future plans. I have discussed medication side effects and warnings with the patient as well.     MD GENNY Rodrigues & ROSALINDA LAWS [facility]  10/11/22

## 2022-10-11 NOTE — PROGRESS NOTES
1. Have you been to the ER, urgent care clinic since your last visit? Hospitalized since your last visit? No    2. Have you seen or consulted any other health care providers outside of the [address] [place] [address] since your last visit? Including any pap smears or colon screening.  No      Health Maintenance Due   Topic Date Due    MenB Meningococcal topic (1 of 4 - Increased Risk Bexsero 2-dose series) Never done    Shingrix Vaccine Age 50> (1 of 2) Never done    Pneumococcal 65+ years (3 - PPSV23 or PCV20) 09/01/2020    Flu Vaccine (1) 08/01/2022

## 2022-10-12 LAB
ALBUMIN SERPL-MCNC: 3.9 G/DL (ref 3.5–5)
ALBUMIN/GLOB SERPL: 1.1 {RATIO} (ref 1.1–2.2)
ALP SERPL-CCNC: 74 U/L (ref 45–117)
ALT SERPL-CCNC: 25 U/L (ref 12–78)
ANION GAP SERPL CALC-SCNC: 6 MMOL/L (ref 5–15)
AST SERPL-CCNC: 23 U/L (ref 15–37)
BILIRUB SERPL-MCNC: 0.8 MG/DL (ref 0.2–1)
BUN SERPL-MCNC: 9 MG/DL (ref 6–20)
BUN/CREAT SERPL: 11 (ref 12–20)
CALCIUM SERPL-MCNC: 10.2 MG/DL (ref 8.5–10.1)
CHLORIDE SERPL-SCNC: 109 MMOL/L (ref 97–108)
CHOLEST SERPL-MCNC: 171 MG/DL
CO2 SERPL-SCNC: 26 MMOL/L (ref 21–32)
CREAT SERPL-MCNC: 0.85 MG/DL (ref 0.7–1.3)
ERYTHROCYTE [DISTWIDTH] IN BLOOD BY AUTOMATED COUNT: 13.7 % (ref 11.5–14.5)
GLOBULIN SER CALC-MCNC: 3.7 G/DL (ref 2–4)
GLUCOSE SERPL-MCNC: 95 MG/DL (ref 65–100)
HCT VFR BLD AUTO: 42 % (ref 36.6–50.3)
HDLC SERPL-MCNC: 62 MG/DL
HDLC SERPL: 2.8 {RATIO} (ref 0–5)
HGB BLD-MCNC: 13.1 G/DL (ref 12.1–17)
LDLC SERPL CALC-MCNC: 92.8 MG/DL (ref 0–100)
MCH RBC QN AUTO: 30.1 PG (ref 26–34)
MCHC RBC AUTO-ENTMCNC: 31.2 G/DL (ref 30–36.5)
MCV RBC AUTO: 96.6 FL (ref 80–99)
NRBC # BLD: 0 K/UL (ref 0–0.01)
NRBC BLD-RTO: 0 PER 100 WBC
PLATELET # BLD AUTO: 240 K/UL (ref 150–400)
PMV BLD AUTO: 11.2 FL (ref 8.9–12.9)
POTASSIUM SERPL-SCNC: 4.4 MMOL/L (ref 3.5–5.1)
PROT SERPL-MCNC: 7.6 G/DL (ref 6.4–8.2)
RBC # BLD AUTO: 4.35 M/UL (ref 4.1–5.7)
SODIUM SERPL-SCNC: 141 MMOL/L (ref 136–145)
T4 FREE SERPL-MCNC: 1.2 NG/DL (ref 0.8–1.5)
TRIGL SERPL-MCNC: 81 MG/DL (ref ?–150)
TSH SERPL DL<=0.05 MIU/L-ACNC: 1.32 UIU/ML (ref 0.36–3.74)
VLDLC SERPL CALC-MCNC: 16.2 MG/DL
WBC # BLD AUTO: 6.4 K/UL (ref 4.1–11.1)

## 2022-12-14 ENCOUNTER — TELEPHONE (OUTPATIENT)
Dept: FAMILY MEDICINE CLINIC | Age: 72
End: 2022-12-14

## 2023-01-11 ENCOUNTER — OFFICE VISIT (OUTPATIENT)
Dept: FAMILY MEDICINE CLINIC | Age: 73
End: 2023-01-11
Payer: MEDICARE

## 2023-01-11 VITALS
HEART RATE: 58 BPM | TEMPERATURE: 98.1 F | DIASTOLIC BLOOD PRESSURE: 72 MMHG | HEIGHT: 71 IN | WEIGHT: 209 LBS | SYSTOLIC BLOOD PRESSURE: 127 MMHG | OXYGEN SATURATION: 97 % | RESPIRATION RATE: 17 BRPM | BODY MASS INDEX: 29.26 KG/M2

## 2023-01-11 DIAGNOSIS — M54.42 CHRONIC MIDLINE LOW BACK PAIN WITH LEFT-SIDED SCIATICA: ICD-10-CM

## 2023-01-11 DIAGNOSIS — E03.4 HYPOTHYROIDISM DUE TO ACQUIRED ATROPHY OF THYROID: ICD-10-CM

## 2023-01-11 DIAGNOSIS — G89.29 CHRONIC MIDLINE LOW BACK PAIN WITH LEFT-SIDED SCIATICA: ICD-10-CM

## 2023-01-11 DIAGNOSIS — J41.0 SIMPLE CHRONIC BRONCHITIS (HCC): ICD-10-CM

## 2023-01-11 DIAGNOSIS — I10 PRIMARY HYPERTENSION: Primary | ICD-10-CM

## 2023-01-11 DIAGNOSIS — E78.2 MIXED HYPERLIPIDEMIA: ICD-10-CM

## 2023-01-11 DIAGNOSIS — D17.0 LIPOMA OF NECK: ICD-10-CM

## 2023-01-11 DIAGNOSIS — I10 ESSENTIAL HYPERTENSION: Chronic | ICD-10-CM

## 2023-01-11 PROCEDURE — 1123F ACP DISCUSS/DSCN MKR DOCD: CPT | Performed by: FAMILY MEDICINE

## 2023-01-11 PROCEDURE — 3017F COLORECTAL CA SCREEN DOC REV: CPT | Performed by: FAMILY MEDICINE

## 2023-01-11 PROCEDURE — 3074F SYST BP LT 130 MM HG: CPT | Performed by: FAMILY MEDICINE

## 2023-01-11 PROCEDURE — 1101F PT FALLS ASSESS-DOCD LE1/YR: CPT | Performed by: FAMILY MEDICINE

## 2023-01-11 PROCEDURE — 99214 OFFICE O/P EST MOD 30 MIN: CPT | Performed by: FAMILY MEDICINE

## 2023-01-11 PROCEDURE — 3078F DIAST BP <80 MM HG: CPT | Performed by: FAMILY MEDICINE

## 2023-01-11 PROCEDURE — G8417 CALC BMI ABV UP PARAM F/U: HCPCS | Performed by: FAMILY MEDICINE

## 2023-01-11 PROCEDURE — G8427 DOCREV CUR MEDS BY ELIG CLIN: HCPCS | Performed by: FAMILY MEDICINE

## 2023-01-11 PROCEDURE — G8536 NO DOC ELDER MAL SCRN: HCPCS | Performed by: FAMILY MEDICINE

## 2023-01-11 PROCEDURE — G8510 SCR DEP NEG, NO PLAN REQD: HCPCS | Performed by: FAMILY MEDICINE

## 2023-01-11 RX ORDER — LEVOTHYROXINE SODIUM 150 MCG
150 TABLET ORAL
Qty: 90 TABLET | Refills: 1 | Status: SHIPPED | OUTPATIENT
Start: 2023-01-11

## 2023-01-11 RX ORDER — AMLODIPINE BESYLATE 2.5 MG/1
2.5 TABLET ORAL DAILY
Qty: 90 TABLET | Refills: 1 | Status: SHIPPED | OUTPATIENT
Start: 2023-01-11

## 2023-01-11 NOTE — PROGRESS NOTES
Chief Complaint   Patient presents with    Follow Up Chronic Condition     Wants to discuss area to left neck. Knee Pain     X1 week duration, subsiding. 1. \"Have you been to the ER, urgent care clinic since your last visit? Hospitalized since your last visit? \" No    2. \"Have you seen or consulted any other health care providers outside of the [address] [place] [address] since your last visit? \" No     3. For patients aged 39-70: Has the patient had a colonoscopy / FIT/ Cologuard? Yes - no Care Gap present      If the patient is female:    4. For patients aged 41-77: Has the patient had a mammogram within the past 2 years? NA - based on age or sex      11. For patients aged 21-65: Has the patient had a pap smear?  NA - based on age or sex    Health Maintenance Due   Topic Date Due    MenB Meningococcal topic (1 of 4 - Increased Risk Bexsero 2-dose series) Never done    Shingles Vaccine (1 of 2) Never done    Pneumococcal 65+ years (3 - PPSV23 if available, else PCV20) 09/01/2020    Flu Vaccine (1) 08/01/2022

## 2023-01-11 NOTE — PROGRESS NOTES
[address] [facility]    History of Present Illness:   Joel Phelps is a 81 y.o. male with history of HTN, CVA, Hypothyroidism, DDD, Prostate Cancer, Vitamin D deficiency  CC: Follow up and left back Pain  History provided by patient and Records    HPI:  Hypertension Follow up:  Currently Taking:   Key CAD CHF Meds               amLODIPine (NORVASC) 2.5 mg tablet (Taking) Take 1 Tablet by mouth daily. rosuvastatin (CRESTOR) 10 mg tablet (Taking) TAKE ONE TABLET BY MOUTH NIGHTLY ON MONDAY, WEDNESDAY AND FRIDAY OF EACH WEEK    aspirin (ASPIRIN) 325 mg tablet (Taking) Take 1 Tab by mouth daily. omega-3 fatty acids-vitamin e 1,000 mg cap (Taking) Take 1 Cap by mouth two (2) times a day. The patient reports:  taking medications as instructed, no medication side effects noted, no TIA's, no chest pain on exertion, no dyspnea on exertion, no swelling of ankles, no orthostatic dizziness or lightheadedness, no orthopnea or paroxysmal nocturnal dyspnea. BP Readings from Last 3 Encounters:   01/11/23 127/72   10/11/22 (!) 161/89   07/06/22 128/73      Hypertriglyceridemia Follow up:   Cardiovascular risks for him are: hypertension  hyperlipidemia. Current Medications:  Key Antihyperlipidemia Meds               rosuvastatin (CRESTOR) 10 mg tablet (Taking) TAKE ONE TABLET BY MOUTH NIGHTLY ON MONDAY, WEDNESDAY AND FRIDAY OF EACH WEEK    omega-3 fatty acids-vitamin e 1,000 mg cap (Taking) Take 1 Cap by mouth two (2) times a day.             Compliance: YES   Myalgias: NO   Fatigue: NO   Other side effects: NO     Wt Readings from Last 3 Encounters:   01/11/23 209 lb (94.8 kg)   10/11/22 211 lb 3.2 oz (95.8 kg)   07/06/22 212 lb (96.2 kg)       Lab Results   Component Value Date/Time    Cholesterol, total 171 10/11/2022 09:50 AM    HDL Cholesterol 62 10/11/2022 09:50 AM    LDL, calculated 92.8 10/11/2022 09:50 AM    VLDL, calculated 16.2 10/11/2022 09:50 AM    Triglyceride 81 10/11/2022 09:50 AM CHOL/HDL Ratio 2.8 10/11/2022 09:50 AM      Lab Results   Component Value Date/Time    ALT (SGPT) 25 10/11/2022 09:50 AM    AST (SGOT) 23 10/11/2022 09:50 AM    Alk. phosphatase 74 10/11/2022 09:50 AM    Bilirubin, total 0.8 10/11/2022 09:50 AM      COPD: Doing well at this time. Lipoma/Mass of the left neck: Large Lipoma of the left neck that is tender to palpation at times. Gets irritated by clothes. Denies change in size or drainage    Hx of CVA: Had Carotid Dopplers,      Health Maintenance  Health Maintenance Due   Topic Date Due    MenB Meningococcal topic (1 of 4 - Increased Risk Bexsero 2-dose series) Never done    Shingles Vaccine (1 of 2) Never done    Pneumococcal 65+ years (3 - PPSV23 if available, else PCV20) 09/01/2020    Flu Vaccine (1) 08/01/2022       Past Medical, Family, and Social History:     Current Outpatient Medications on File Prior to Visit   Medication Sig Dispense Refill    rosuvastatin (CRESTOR) 10 mg tablet TAKE ONE TABLET BY MOUTH NIGHTLY ON MONDAY, WEDNESDAY AND FRIDAY OF EACH WEEK 36 Tablet 1    fluticasone propionate (FLONASE) 50 mcg/actuation nasal spray USE 2 SPRAYS IN EACH NOSTRIL DAILY 16 g 5    tadalafiL (CIALIS) 5 mg tablet       DOCUSATE SODIUM PO Take 100 mg by mouth daily. budesonide (Pulmicort Flexhaler) 180 mcg/actuation aepb inhaler Take 1 Puff by inhalation two (2) times a day. 1 Each 5    diclofenac (VOLTAREN) 1 % gel Apply 1 g to affected area every six (6) hours. Apply to neck, legs 350 g 3    aspirin (ASPIRIN) 325 mg tablet Take 1 Tab by mouth daily. cyanocobalamin 1,000 mcg tablet Take 1,000 mcg by mouth daily. omega-3 fatty acids-vitamin e 1,000 mg cap Take 1 Cap by mouth two (2) times a day. [DISCONTINUED] amLODIPine (NORVASC) 2.5 mg tablet Take 1 Tablet by mouth daily. 90 Tablet 1    [DISCONTINUED] Synthroid 150 mcg tablet Take 1 Tablet by mouth Daily (before breakfast).  90 Tablet 1     No current facility-administered medications on file prior to visit. Patient Active Problem List   Diagnosis Code    Mixed hyperlipidemia E78.2    Benign neoplasm of colon D12.6    Keloid L91.0    Internal hemorrhoid K64.8    Thyroglossal duct cyst Q89.2    Hypothyroidism E03.9    Hypertension I10    History of CVA (cerebrovascular accident) Z86.73    Spinal stenosis of cervical region M48.02    Pain R52    Shoulder-hand syndrome M89.09    Weakness R53.1    Poor historian Z78.9    Chronic midline low back pain with left-sided sciatica M54.42, G89.29    History of prostate cancer Z85.46    Foraminal stenosis of lumbar region M48.061    Simple chronic bronchitis (HCC) J41.0       Social History     Socioeconomic History    Marital status:    Tobacco Use    Smoking status: Former     Packs/day: 0.25     Years: 40.00     Pack years: 10.00     Types: Cigarettes     Quit date: 2015     Years since quittin.9    Smokeless tobacco: Never    Tobacco comments:     smokes 1/2 pack week   Substance and Sexual Activity    Alcohol use: Yes     Alcohol/week: 5.0 standard drinks     Types: 6 Cans of beer per week     Comment: moderately    Drug use: No    Sexual activity: Yes     Social Determinants of Health     Financial Resource Strain: Low Risk     Difficulty of Paying Living Expenses: Not hard at all   Food Insecurity: No Food Insecurity    Worried About Running Out of Food in the Last Year: Never true    Ran Out of Food in the Last Year: Never true        Review of Systems   Review of Systems   Constitutional:  Negative for chills and fever. Gastrointestinal:  Negative for abdominal pain, nausea and vomiting. Neurological:  Negative for dizziness, tingling and headaches.      Objective:   Visit Vitals  /72 (BP 1 Location: Right arm, BP Patient Position: Sitting, BP Cuff Size: Adult)   Pulse (!) 58   Temp 98.1 °F (36.7 °C) (Oral)   Resp 17   Ht 5' 11\" (1.803 m)   Wt 209 lb (94.8 kg)   SpO2 97%   BMI 29.15 kg/m²        Physical Exam  Vitals and nursing note reviewed. Constitutional:       Appearance: Normal appearance. HENT:      Head: Normocephalic and atraumatic. Cardiovascular:      Rate and Rhythm: Normal rate and regular rhythm. Pulses: Normal pulses. Heart sounds: Normal heart sounds. No murmur heard. No friction rub. No gallop. Pulmonary:      Effort: Pulmonary effort is normal.      Breath sounds: Normal breath sounds. Abdominal:      General: Abdomen is flat. Bowel sounds are normal.      Palpations: Abdomen is soft. Musculoskeletal:      Cervical back: Normal range of motion and neck supple. Right lower leg: No edema. Left lower leg: No edema. Skin:     Comments: Large, soft, Mobile lesion of the left neck at base of neck area. Neurological:      Mental Status: He is alert. Pertinent Labs/Studies:      Assessment and orders:       ICD-10-CM ICD-9-CM    1. Primary hypertension  I10 401.9 CBC W/O DIFF      METABOLIC PANEL, COMPREHENSIVE      2. Mixed hyperlipidemia  E78.2 272.2 LIPID PANEL      3. Simple chronic bronchitis (HCC)  J41.0 491.0       4. Hypothyroidism due to acquired atrophy of thyroid  E03.4 244.8 T4, FREE     246.8 TSH 3RD GENERATION      Synthroid 150 mcg tablet      5. Chronic midline low back pain with left-sided sciatica  M54.42 724.2     G89.29 724.3      338.29       6. Lipoma of neck  D17.0 214.1       7. Essential hypertension  I10 401.9 amLODIPine (NORVASC) 2.5 mg tablet        Diagnoses and all orders for this visit:    1. Primary hypertension: Our goal is to normalize the blood pressure to decrease the risks of strokes and heart attacks. The patient is in agreement with the plan. -     CBC W/O DIFF; Future  -     METABOLIC PANEL, COMPREHENSIVE; Future    2. Mixed hyperlipidemia  -     LIPID PANEL; Future    3. Simple chronic bronchitis ([address]    4. Hypothyroidism due to acquired atrophy of thyroid  -     T4, FREE; Future  -     TSH 3RD GENERATION;  Future  -     Synthroid 150 mcg tablet; Take 1 Tablet by mouth Daily (before breakfast). 5. Chronic midline low back pain with left-sided sciatica    6. Lipoma of neck: Noting irritating lesion of the left neck that is large. Would like removed, will setup Procedure appointment for removal of lesion. 7. Essential hypertension:   -     amLODIPine (NORVASC) 2.5 mg tablet; Take 1 Tablet by mouth daily. Follow-up and Dispositions    Return in about 3 weeks (around 2/1/2023) for Setup 40 minute Procedure appointment for [facility] for Lipoma Removal..           I have discussed the diagnosis with the patient and the intended plan as seen in the above orders. Social history, medical history, and labs were reviewed. The patient has received an after-visit summary and questions were answered concerning future plans. I have discussed medication side effects and warnings with the patient as well.     MD GENNY Blandon & ROSALINDA LAWS [facility]  01/11/23

## 2023-01-12 LAB
ALBUMIN SERPL-MCNC: 4 G/DL (ref 3.5–5)
ALBUMIN/GLOB SERPL: 1 (ref 1.1–2.2)
ALP SERPL-CCNC: 65 U/L (ref 45–117)
ALT SERPL-CCNC: 24 U/L (ref 12–78)
ANION GAP SERPL CALC-SCNC: 4 MMOL/L (ref 5–15)
AST SERPL-CCNC: 25 U/L (ref 15–37)
BILIRUB SERPL-MCNC: 1 MG/DL (ref 0.2–1)
BUN SERPL-MCNC: 15 MG/DL (ref 6–20)
BUN/CREAT SERPL: 17 (ref 12–20)
CALCIUM SERPL-MCNC: 10.4 MG/DL (ref 8.5–10.1)
CHLORIDE SERPL-SCNC: 111 MMOL/L (ref 97–108)
CHOLEST SERPL-MCNC: 171 MG/DL
CO2 SERPL-SCNC: 27 MMOL/L (ref 21–32)
CREAT SERPL-MCNC: 0.88 MG/DL (ref 0.7–1.3)
ERYTHROCYTE [DISTWIDTH] IN BLOOD BY AUTOMATED COUNT: 13.6 % (ref 11.5–14.5)
GLOBULIN SER CALC-MCNC: 4.1 G/DL (ref 2–4)
GLUCOSE SERPL-MCNC: 99 MG/DL (ref 65–100)
HCT VFR BLD AUTO: 41.6 % (ref 36.6–50.3)
HDLC SERPL-MCNC: 70 MG/DL
HDLC SERPL: 2.4 (ref 0–5)
HGB BLD-MCNC: 13.2 G/DL (ref 12.1–17)
LDLC SERPL CALC-MCNC: 84.8 MG/DL (ref 0–100)
MCH RBC QN AUTO: 29.1 PG (ref 26–34)
MCHC RBC AUTO-ENTMCNC: 31.7 G/DL (ref 30–36.5)
MCV RBC AUTO: 91.8 FL (ref 80–99)
NRBC # BLD: 0 K/UL (ref 0–0.01)
NRBC BLD-RTO: 0 PER 100 WBC
PLATELET # BLD AUTO: 214 K/UL (ref 150–400)
PMV BLD AUTO: 11.2 FL (ref 8.9–12.9)
POTASSIUM SERPL-SCNC: 4.2 MMOL/L (ref 3.5–5.1)
PROT SERPL-MCNC: 8.1 G/DL (ref 6.4–8.2)
RBC # BLD AUTO: 4.53 M/UL (ref 4.1–5.7)
SODIUM SERPL-SCNC: 142 MMOL/L (ref 136–145)
T4 FREE SERPL-MCNC: 1.2 NG/DL (ref 0.8–1.5)
TRIGL SERPL-MCNC: 81 MG/DL (ref ?–150)
TSH SERPL DL<=0.05 MIU/L-ACNC: 0.63 UIU/ML (ref 0.36–3.74)
VLDLC SERPL CALC-MCNC: 16.2 MG/DL
WBC # BLD AUTO: 5.1 K/UL (ref 4.1–11.1)

## 2023-02-01 ENCOUNTER — HOSPITAL ENCOUNTER (OUTPATIENT)
Dept: LAB | Age: 73
Discharge: HOME | End: 2023-02-01

## 2023-02-01 ENCOUNTER — OFFICE VISIT (OUTPATIENT)
Dept: FAMILY MEDICINE CLINIC | Age: 73
End: 2023-02-01
Payer: MEDICARE

## 2023-02-01 VITALS
HEIGHT: 71 IN | RESPIRATION RATE: 18 BRPM | SYSTOLIC BLOOD PRESSURE: 139 MMHG | TEMPERATURE: 97.4 F | OXYGEN SATURATION: 98 % | WEIGHT: 209.4 LBS | HEART RATE: 79 BPM | BODY MASS INDEX: 29.31 KG/M2 | DIASTOLIC BLOOD PRESSURE: 79 MMHG

## 2023-02-01 DIAGNOSIS — D17.0 LIPOMA OF NECK: Primary | ICD-10-CM

## 2023-02-01 PROCEDURE — 11443 EXC FACE-MM B9+MARG 2.1-3 CM: CPT | Performed by: FAMILY MEDICINE

## 2023-02-01 RX ORDER — DOXYCYCLINE 100 MG/1
100 CAPSULE ORAL 2 TIMES DAILY
Qty: 10 CAPSULE | Refills: 0 | Status: SHIPPED | OUTPATIENT
Start: 2023-02-01 | End: 2023-02-06

## 2023-02-01 NOTE — PROGRESS NOTES
1. Have you been to the ER, urgent care clinic since your last visit? Hospitalized since your last visit? No    2. Have you seen or consulted any other health care providers outside of the [address] [place] [address] since your last visit? Including any pap smears or colon screening.  No      Health Maintenance Due   Topic Date Due    MenB Meningococcal topic (1 of 4 - Increased Risk Bexsero 2-dose series) Never done    Shingles Vaccine (1 of 2) Never done    Pneumococcal 65+ years (3 - PPSV23 if available, else PCV20) 09/01/2020    Flu Vaccine (1) 08/01/2022

## 2023-02-01 NOTE — PROGRESS NOTES
Clinic Procedure Note:    Procedure performed: Excision of Lipoma of the nexk    Indications: Painful lipoma neck    Date of procedure: 02/01/23    Chart reviewed for the following:              Latesha Hester MD, have reviewed the History, Physical and updated the Allergic reactions for [address] [place] [address] performed immediately prior to start of procedure:              I, Aileen Baumgarten, MD, have performed the following reviews on Mandy Merritt prior to the start of the procedure, see attached form in media. Procedure:  After consent was obtained, using sterile technique the left neck are was prepped using Betadine. Local anesthetic used: 1% lidocaine 6 mL     Single straight incision of skin made ~ 5 cm in length with exposure of Lipoma/Fatty material in subcutaneous tissue. Lipoma excised with use of forceps and and scissors with gentle dissection of lipoma material form subcutaneous tissue. After dissection Lipoma material removed. Total Lipoma dimension ~3 cm deep x 3 cm long x 2 cm wide. No significant bleeding present after excision. Skin was reapproximated and 8 interrupted stitches with 3-0 Vicryl were placed with good hemostasis. Procedure performed by:   Aileen Baumgarten, MD  Provider assisted by: Triston Thompson LPN   Patient assisted by: self     How tolerated by patient: tolerated the procedure well with no complications    Comments: none    Aileen Baumgarten, MD  Resident GENNY MONTENEGRO & ROSALINDA LAWS [facility] [place]

## 2023-02-07 ENCOUNTER — TELEPHONE (OUTPATIENT)
Dept: FAMILY MEDICINE CLINIC | Age: 73
End: 2023-02-07

## 2023-02-07 NOTE — TELEPHONE ENCOUNTER
Patient called, notified of lipoma. Stated he and his wife have been monitoring incision and has been going good.

## 2023-02-07 NOTE — TELEPHONE ENCOUNTER
----- Message from Hernesto Anne MD sent at 2/6/2023 11:16 PM EST -----  Can you pass along the pathology that the mass we pulled out was lipoma/Fat material, nothing else, no cancer, everything is good. Ask how he is doing, any issues with the sutures or incision at this time, or any drainage at all.   Thanks, Joan Crain  ----- Message -----  From: [facility] [place] [facility] Results  Sent: 2/6/2023   4:27 PM EST  To: Hernesto Anne MD

## 2023-02-09 NOTE — TELEPHONE ENCOUNTER
Pt states his wife took a closer look at the wound and it looks like it is getting bigger. Pt would like a call back.

## 2023-02-09 NOTE — TELEPHONE ENCOUNTER
Call placed to pt whom stated he had no pain swelling or discomfort. Will wait until Monday foe appt

## 2023-02-13 ENCOUNTER — OFFICE VISIT (OUTPATIENT)
Dept: FAMILY MEDICINE CLINIC | Age: 73
End: 2023-02-13
Payer: MEDICARE

## 2023-02-13 VITALS
SYSTOLIC BLOOD PRESSURE: 145 MMHG | OXYGEN SATURATION: 96 % | HEART RATE: 59 BPM | TEMPERATURE: 98.8 F | WEIGHT: 207.8 LBS | BODY MASS INDEX: 29.09 KG/M2 | DIASTOLIC BLOOD PRESSURE: 82 MMHG | RESPIRATION RATE: 18 BRPM | HEIGHT: 71 IN

## 2023-02-13 DIAGNOSIS — D17.0 LIPOMA OF NECK: Primary | ICD-10-CM

## 2023-02-13 DIAGNOSIS — Z48.02 VISIT FOR SUTURE REMOVAL: ICD-10-CM

## 2023-02-13 PROCEDURE — G8536 NO DOC ELDER MAL SCRN: HCPCS | Performed by: FAMILY MEDICINE

## 2023-02-13 PROCEDURE — 3017F COLORECTAL CA SCREEN DOC REV: CPT | Performed by: FAMILY MEDICINE

## 2023-02-13 PROCEDURE — G8417 CALC BMI ABV UP PARAM F/U: HCPCS | Performed by: FAMILY MEDICINE

## 2023-02-13 PROCEDURE — G8510 SCR DEP NEG, NO PLAN REQD: HCPCS | Performed by: FAMILY MEDICINE

## 2023-02-13 PROCEDURE — 99212 OFFICE O/P EST SF 10 MIN: CPT | Performed by: FAMILY MEDICINE

## 2023-02-13 PROCEDURE — 3077F SYST BP >= 140 MM HG: CPT | Performed by: FAMILY MEDICINE

## 2023-02-13 PROCEDURE — 1123F ACP DISCUSS/DSCN MKR DOCD: CPT | Performed by: FAMILY MEDICINE

## 2023-02-13 PROCEDURE — 1101F PT FALLS ASSESS-DOCD LE1/YR: CPT | Performed by: FAMILY MEDICINE

## 2023-02-13 PROCEDURE — G8427 DOCREV CUR MEDS BY ELIG CLIN: HCPCS | Performed by: FAMILY MEDICINE

## 2023-02-13 PROCEDURE — 3079F DIAST BP 80-89 MM HG: CPT | Performed by: FAMILY MEDICINE

## 2023-02-13 NOTE — PROGRESS NOTES
1. Have you been to the ER, urgent care clinic since your last visit? Hospitalized since your last visit? No    2. Have you seen or consulted any other health care providers outside of the [address] [place] [address] since your last visit? Including any pap smears or colon screening.  No      Health Maintenance Due   Topic Date Due    MenB Meningococcal topic (1 of 4 - Increased Risk Bexsero 2-dose series) Never done    Shingles Vaccine (1 of 2) Never done    Pneumococcal 65+ years (3 - PPSV23 if available, else PCV20) 09/01/2020    Flu Vaccine (1) 08/01/2022

## 2023-02-13 NOTE — PROGRESS NOTES
[facility]    History of Present Illness:   Neelam Hampton is a 81 y.o. male with history of HTN, CVA, Hypothyroidism, DDD, Prostate Cancer, Vitamin D deficiency  CC: Suture removal  History provided by patient and Records    HPI:  Follow up from Lipoma removal for suture removal 8 sutures removed. Patient noted to have some swelling following the lipoma removal that is improving now. Health Maintenance  Health Maintenance Due   Topic Date Due    MenB Meningococcal topic (1 of 4 - Increased Risk Bexsero 2-dose series) Never done    Shingles Vaccine (1 of 2) Never done    Pneumococcal 65+ years (3 - PPSV23 if available, else PCV20) 09/01/2020    Flu Vaccine (1) 08/01/2022       Past Medical, Family, and Social History:     Current Outpatient Medications on File Prior to Visit   Medication Sig Dispense Refill    amLODIPine (NORVASC) 2.5 mg tablet Take 1 Tablet by mouth daily. 90 Tablet 1    Synthroid 150 mcg tablet Take 1 Tablet by mouth Daily (before breakfast). 90 Tablet 1    rosuvastatin (CRESTOR) 10 mg tablet TAKE ONE TABLET BY MOUTH NIGHTLY ON MONDAY, WEDNESDAY AND FRIDAY OF EACH WEEK 36 Tablet 1    fluticasone propionate (FLONASE) 50 mcg/actuation nasal spray USE 2 SPRAYS IN EACH NOSTRIL DAILY 16 g 5    tadalafiL (CIALIS) 5 mg tablet       DOCUSATE SODIUM PO Take 100 mg by mouth daily. budesonide (Pulmicort Flexhaler) 180 mcg/actuation aepb inhaler Take 1 Puff by inhalation two (2) times a day. 1 Each 5    diclofenac (VOLTAREN) 1 % gel Apply 1 g to affected area every six (6) hours. Apply to neck, legs 350 g 3    aspirin (ASPIRIN) 325 mg tablet Take 1 Tab by mouth daily. cyanocobalamin 1,000 mcg tablet Take 1,000 mcg by mouth daily. omega-3 fatty acids-vitamin e 1,000 mg cap Take 1 Cap by mouth two (2) times a day. No current facility-administered medications on file prior to visit.        Patient Active Problem List   Diagnosis Code    Mixed hyperlipidemia E78.2 Benign neoplasm of colon D12.6    Keloid L91.0    Internal hemorrhoid K64.8    Thyroglossal duct cyst Q89.2    Hypothyroidism E03.9    Hypertension I10    History of CVA (cerebrovascular accident) Z86.73    Spinal stenosis of cervical region M48.02    Pain R52    Shoulder-hand syndrome M89.09    Weakness R53.1    Poor historian Z78.9    Chronic midline low back pain with left-sided sciatica M54.42, G89.29    History of prostate cancer Z85.46    Foraminal stenosis of lumbar region M48.061    Simple chronic bronchitis (HCC) J41.0       Social History     Socioeconomic History    Marital status:    Tobacco Use    Smoking status: Former     Packs/day: 0.25     Years: 40.00     Pack years: 10.00     Types: Cigarettes     Quit date: 2015     Years since quittin.0    Smokeless tobacco: Never    Tobacco comments:     smokes 1/2 pack week   Substance and Sexual Activity    Alcohol use: Yes     Alcohol/week: 5.0 standard drinks     Types: 6 Cans of beer per week     Comment: moderately    Drug use: No    Sexual activity: Yes     Social Determinants of Health     Financial Resource Strain: Low Risk     Difficulty of Paying Living Expenses: Not hard at all   Food Insecurity: No Food Insecurity    Worried About Running Out of Food in the Last Year: Never true    Ran Out of Food in the Last Year: Never true        Review of Systems   Review of Systems   Skin:  Negative for itching and rash. All other systems reviewed and are negative. Objective:   Visit Vitals  BP (!) 145/82 (BP 1 Location: Right arm, BP Patient Position: Sitting, BP Cuff Size: Adult)   Pulse (!) 59   Temp 98.8 °F (37.1 °C) (Oral)   Resp 18   Ht 5' 11\" (1.803 m)   Wt 207 lb 12.8 oz (94.3 kg)   SpO2 96%   BMI 28.98 kg/m²        Physical Exam  Vitals reviewed. Constitutional:       Appearance: Normal appearance. HENT:      Head: Normocephalic and atraumatic. Cardiovascular:      Pulses: Normal pulses.       Heart sounds: Normal heart sounds. No murmur heard. No friction rub. No gallop. Pulmonary:      Effort: Pulmonary effort is normal.      Breath sounds: Normal breath sounds. Skin:     Comments: Well healed incision site. 8 Sutures identified and removed. Induration present without fluctuance present. Neurological:      Mental Status: He is alert. Pertinent Labs/Studies:      Assessment and orders:       ICD-10-CM ICD-9-CM    1. Lipoma of neck  D17.0 214.1       2. Visit for suture removal  Z48.02 V58.32 SUTURE / STAPLE REMOVAL BY PROVIDER        Diagnoses and all orders for this visit:    1. Lipoma of neck    2. Visit for suture removal  -     SUTURE / STAPLE REMOVAL BY PROVIDER    Follow-up and Dispositions    Return in about 3 months (around 5/13/2023) for Follow up chronic conditions. I have discussed the diagnosis with the patient and the intended plan as seen in the above orders. Social history, medical history, and labs were reviewed. The patient has received an after-visit summary and questions were answered concerning future plans. I have discussed medication side effects and warnings with the patient as well.     MD GENNY Prieto & ROSALINDA LAWS [facility]  02/13/23

## 2023-05-13 SDOH — ECONOMIC STABILITY: FOOD INSECURITY: WITHIN THE PAST 12 MONTHS, THE FOOD YOU BOUGHT JUST DIDN'T LAST AND YOU DIDN'T HAVE MONEY TO GET MORE.: NEVER TRUE

## 2023-05-13 SDOH — ECONOMIC STABILITY: FOOD INSECURITY: WITHIN THE PAST 12 MONTHS, YOU WORRIED THAT YOUR FOOD WOULD RUN OUT BEFORE YOU GOT MONEY TO BUY MORE.: NEVER TRUE

## 2023-05-13 SDOH — ECONOMIC STABILITY: TRANSPORTATION INSECURITY
IN THE PAST 12 MONTHS, HAS LACK OF TRANSPORTATION KEPT YOU FROM MEETINGS, WORK, OR FROM GETTING THINGS NEEDED FOR DAILY LIVING?: NO

## 2023-05-13 SDOH — ECONOMIC STABILITY: INCOME INSECURITY: HOW HARD IS IT FOR YOU TO PAY FOR THE VERY BASICS LIKE FOOD, HOUSING, MEDICAL CARE, AND HEATING?: NOT HARD AT ALL

## 2023-05-13 SDOH — ECONOMIC STABILITY: HOUSING INSECURITY
IN THE LAST 12 MONTHS, WAS THERE A TIME WHEN YOU DID NOT HAVE A STEADY PLACE TO SLEEP OR SLEPT IN A SHELTER (INCLUDING NOW)?: NO

## 2023-05-15 ENCOUNTER — OFFICE VISIT (OUTPATIENT)
Facility: CLINIC | Age: 73
End: 2023-05-15
Payer: MEDICARE

## 2023-05-15 VITALS
DIASTOLIC BLOOD PRESSURE: 78 MMHG | RESPIRATION RATE: 18 BRPM | WEIGHT: 204.8 LBS | TEMPERATURE: 97 F | OXYGEN SATURATION: 98 % | SYSTOLIC BLOOD PRESSURE: 129 MMHG | HEIGHT: 71 IN | HEART RATE: 54 BPM | BODY MASS INDEX: 28.67 KG/M2

## 2023-05-15 DIAGNOSIS — J41.0 SIMPLE CHRONIC BRONCHITIS (HCC): ICD-10-CM

## 2023-05-15 DIAGNOSIS — I10 PRIMARY HYPERTENSION: Primary | ICD-10-CM

## 2023-05-15 DIAGNOSIS — M54.42 CHRONIC MIDLINE LOW BACK PAIN WITH LEFT-SIDED SCIATICA: ICD-10-CM

## 2023-05-15 DIAGNOSIS — G89.29 CHRONIC MIDLINE LOW BACK PAIN WITH LEFT-SIDED SCIATICA: ICD-10-CM

## 2023-05-15 DIAGNOSIS — E78.2 MIXED HYPERLIPIDEMIA: ICD-10-CM

## 2023-05-15 DIAGNOSIS — E55.9 VITAMIN D DEFICIENCY, UNSPECIFIED: ICD-10-CM

## 2023-05-15 DIAGNOSIS — N52.9 ERECTILE DYSFUNCTION, UNSPECIFIED ERECTILE DYSFUNCTION TYPE: ICD-10-CM

## 2023-05-15 DIAGNOSIS — E03.9 ACQUIRED HYPOTHYROIDISM: ICD-10-CM

## 2023-05-15 PROCEDURE — 1123F ACP DISCUSS/DSCN MKR DOCD: CPT | Performed by: FAMILY MEDICINE

## 2023-05-15 PROCEDURE — G8419 CALC BMI OUT NRM PARAM NOF/U: HCPCS | Performed by: FAMILY MEDICINE

## 2023-05-15 PROCEDURE — 3023F SPIROM DOC REV: CPT | Performed by: FAMILY MEDICINE

## 2023-05-15 PROCEDURE — 3079F DIAST BP 80-89 MM HG: CPT | Performed by: FAMILY MEDICINE

## 2023-05-15 PROCEDURE — 3077F SYST BP >= 140 MM HG: CPT | Performed by: FAMILY MEDICINE

## 2023-05-15 PROCEDURE — 99214 OFFICE O/P EST MOD 30 MIN: CPT | Performed by: FAMILY MEDICINE

## 2023-05-15 PROCEDURE — 3017F COLORECTAL CA SCREEN DOC REV: CPT | Performed by: FAMILY MEDICINE

## 2023-05-15 PROCEDURE — G8427 DOCREV CUR MEDS BY ELIG CLIN: HCPCS | Performed by: FAMILY MEDICINE

## 2023-05-15 PROCEDURE — 1036F TOBACCO NON-USER: CPT | Performed by: FAMILY MEDICINE

## 2023-05-15 RX ORDER — AMLODIPINE BESYLATE 2.5 MG/1
2.5 TABLET ORAL DAILY
Qty: 90 TABLET | Refills: 1 | Status: SHIPPED | OUTPATIENT
Start: 2023-05-15

## 2023-05-15 RX ORDER — LEVOTHYROXINE SODIUM 0.15 MG/1
150 TABLET ORAL
Qty: 90 TABLET | Refills: 1 | Status: SHIPPED | OUTPATIENT
Start: 2023-05-15

## 2023-05-15 RX ORDER — ROSUVASTATIN CALCIUM 10 MG/1
TABLET, COATED ORAL
Qty: 40 TABLET | Refills: 1 | Status: SHIPPED | OUTPATIENT
Start: 2023-05-15

## 2023-05-15 RX ORDER — CHLORAL HYDRATE 500 MG
CAPSULE ORAL DAILY
COMMUNITY

## 2023-05-15 RX ORDER — TADALAFIL 5 MG/1
5 TABLET ORAL PRN
Qty: 30 TABLET | Refills: 1 | Status: SHIPPED | OUTPATIENT
Start: 2023-05-15

## 2023-05-15 ASSESSMENT — PATIENT HEALTH QUESTIONNAIRE - PHQ9
SUM OF ALL RESPONSES TO PHQ QUESTIONS 1-9: 0
1. LITTLE INTEREST OR PLEASURE IN DOING THINGS: 0
SUM OF ALL RESPONSES TO PHQ QUESTIONS 1-9: 0
2. FEELING DOWN, DEPRESSED OR HOPELESS: 0
SUM OF ALL RESPONSES TO PHQ QUESTIONS 1-9: 0
SUM OF ALL RESPONSES TO PHQ QUESTIONS 1-9: 0
SUM OF ALL RESPONSES TO PHQ9 QUESTIONS 1 & 2: 0

## 2023-05-15 ASSESSMENT — ANXIETY QUESTIONNAIRES
GAD7 TOTAL SCORE: 0
3. WORRYING TOO MUCH ABOUT DIFFERENT THINGS: 0
6. BECOMING EASILY ANNOYED OR IRRITABLE: 0
2. NOT BEING ABLE TO STOP OR CONTROL WORRYING: 0
5. BEING SO RESTLESS THAT IT IS HARD TO SIT STILL: 0
7. FEELING AFRAID AS IF SOMETHING AWFUL MIGHT HAPPEN: 0
1. FEELING NERVOUS, ANXIOUS, OR ON EDGE: 0
IF YOU CHECKED OFF ANY PROBLEMS ON THIS QUESTIONNAIRE, HOW DIFFICULT HAVE THESE PROBLEMS MADE IT FOR YOU TO DO YOUR WORK, TAKE CARE OF THINGS AT HOME, OR GET ALONG WITH OTHER PEOPLE: NOT DIFFICULT AT ALL
4. TROUBLE RELAXING: 0

## 2023-05-15 ASSESSMENT — ENCOUNTER SYMPTOMS
CHEST TIGHTNESS: 0
APNEA: 0
BACK PAIN: 0

## 2023-05-15 NOTE — PROGRESS NOTES
[facility]    History of Present Illness:   Henok Young is a 72 y.o. male with history of HTN, CVA, Hypothyroidism, DDD, Prostate Cancer, Vitamin D deficiency  CC: Follow up  History provided by patient and Records    HPI:  Hypertension Follow up: The patient reports:  taking medications as instructed, no medication side effects noted, no TIA's, no chest pain on exertion, no dyspnea on exertion, no swelling of ankles, no orthostatic dizziness or lightheadedness, no orthopnea or paroxysmal nocturnal dyspnea. BP Readings from Last 3 Encounters:   05/15/23 (!) 140/84   02/13/23 (!) 145/82   02/01/23 139/79      Hypertriglyceridemia Follow up:   Cardiovascular risks for him are: hypertension  hyperlipidemia. Current Medications:  rosuvastatin - 10 MG    Compliance: Yes   Myalgias: No   Fatigue: No   Other side effects: No     Wt Readings from Last 3 Encounters:   05/15/23 204 lb 12.8 oz (92.9 kg)   02/13/23 207 lb 12.8 oz (94.3 kg)   02/01/23 209 lb 6.4 oz (95 kg)     Lab Results   Component Value Date/Time    CHOL 171 01/11/2023 09:50 AM    HDL 70 01/11/2023 09:50 AM    VLDL 14 02/08/2022 12:00 AM      Lab Results   Component Value Date/Time    ALT 24 01/11/2023 09:50 AM    AST 25 01/11/2023 09:50 AM       Thyroid Disease Follow up:  Currently takes Levothyroxine 150 mcg. Thyroid medication has been has not been changed since last medication check and labs. Patient denies fatigue, nervousness, weight changes, heat or cold intolerance, bowel changes, skin changes, cardiovascular symptoms, hair loss, feeling excessive energy, tremor, palpitations, and/or weight loss.     Lab Results   Component Value Date/Time    TSH 0.63 01/11/2023 09:50 AM        COPD: Doing well at this time    Hx of CVA: Had Carotid Dopplers    Health Maintenance  Health Maintenance Due   Topic Date Due    Hib vaccine (1 of 1 - Risk 1-dose series) Never done    Meningococcal (ACWY) vaccine (1 - Risk 2-dose series)

## 2023-05-15 NOTE — PROGRESS NOTES
1. \"Have you been to the ER, urgent care clinic since your last visit? Hospitalized since your last visit? \" no    2. \"Have you seen or consulted any other health care providers outside of the [address] [place] [address] since your last visit? \"  no     3. For patients aged 39-70: Has the patient had a colonoscopy / FIT/ Cologuard? yes      If the patient is female:    4. For patients aged 41-77: Has the patient had a mammogram within the past 2 years? na      5.  For patients aged 21-65: Has the patient had a pap smear? na    Health Maintenance Due   Topic Date Due    Hib vaccine (1 of 1 - Risk 1-dose series) Never done    Meningococcal (ACWY) vaccine (1 - Risk 2-dose series) Never done    Shingles vaccine (1 of 2) Never done    Prostate Specific Antigen (PSA) Screening or Monitoring  Never done    Pneumococcal 65+ years Vaccine (3 - PPSV23 if available, else PCV20) 09/01/2020

## 2023-05-16 LAB
25(OH)D3 SERPL-MCNC: 52.3 NG/ML (ref 30–100)
ALBUMIN SERPL-MCNC: 4.1 G/DL (ref 3.5–5)
ALBUMIN/GLOB SERPL: 1.2 (ref 1.1–2.2)
ALP SERPL-CCNC: 70 U/L (ref 45–117)
ALT SERPL-CCNC: 28 U/L (ref 12–78)
ANION GAP SERPL CALC-SCNC: 3 MMOL/L (ref 5–15)
AST SERPL-CCNC: 33 U/L (ref 15–37)
BILIRUB SERPL-MCNC: 0.9 MG/DL (ref 0.2–1)
BUN SERPL-MCNC: 11 MG/DL (ref 6–20)
BUN/CREAT SERPL: 13 (ref 12–20)
CALCIUM SERPL-MCNC: 10.1 MG/DL (ref 8.5–10.1)
CHLORIDE SERPL-SCNC: 110 MMOL/L (ref 97–108)
CHOLEST SERPL-MCNC: 166 MG/DL
CO2 SERPL-SCNC: 27 MMOL/L (ref 21–32)
CREAT SERPL-MCNC: 0.85 MG/DL (ref 0.7–1.3)
ERYTHROCYTE [DISTWIDTH] IN BLOOD BY AUTOMATED COUNT: 13.5 % (ref 11.5–14.5)
GLOBULIN SER CALC-MCNC: 3.4 G/DL (ref 2–4)
GLUCOSE SERPL-MCNC: 97 MG/DL (ref 65–100)
HCT VFR BLD AUTO: 41.3 % (ref 36.6–50.3)
HDLC SERPL-MCNC: 69 MG/DL
HDLC SERPL: 2.4 (ref 0–5)
HGB BLD-MCNC: 12.7 G/DL (ref 12.1–17)
LDLC SERPL CALC-MCNC: 82.2 MG/DL (ref 0–100)
MCH RBC QN AUTO: 29 PG (ref 26–34)
MCHC RBC AUTO-ENTMCNC: 30.8 G/DL (ref 30–36.5)
MCV RBC AUTO: 94.3 FL (ref 80–99)
NRBC # BLD: 0 K/UL (ref 0–0.01)
NRBC BLD-RTO: 0 PER 100 WBC
PLATELET # BLD AUTO: 225 K/UL (ref 150–400)
PMV BLD AUTO: 11.8 FL (ref 8.9–12.9)
POTASSIUM SERPL-SCNC: 4.3 MMOL/L (ref 3.5–5.1)
PROT SERPL-MCNC: 7.5 G/DL (ref 6.4–8.2)
RBC # BLD AUTO: 4.38 M/UL (ref 4.1–5.7)
SODIUM SERPL-SCNC: 140 MMOL/L (ref 136–145)
T4 FREE SERPL-MCNC: 1.2 NG/DL (ref 0.8–1.5)
TRIGL SERPL-MCNC: 74 MG/DL
TSH SERPL DL<=0.05 MIU/L-ACNC: 0.98 UIU/ML (ref 0.36–3.74)
VLDLC SERPL CALC-MCNC: 14.8 MG/DL
WBC # BLD AUTO: 5.1 K/UL (ref 4.1–11.1)

## 2023-06-23 DIAGNOSIS — E78.2 MIXED HYPERLIPIDEMIA: ICD-10-CM

## 2023-06-23 RX ORDER — ROSUVASTATIN CALCIUM 10 MG/1
TABLET, COATED ORAL
Qty: 36 TABLET | Refills: 1 | Status: SHIPPED | OUTPATIENT
Start: 2023-06-23

## 2023-08-10 RX ORDER — FLUTICASONE PROPIONATE 50 MCG
SPRAY, SUSPENSION (ML) NASAL
Qty: 16 G | Refills: 5 | Status: SHIPPED | OUTPATIENT
Start: 2023-08-10

## 2023-08-22 ENCOUNTER — OFFICE VISIT (OUTPATIENT)
Facility: CLINIC | Age: 73
End: 2023-08-22
Payer: MEDICARE

## 2023-08-22 VITALS
WEIGHT: 201 LBS | OXYGEN SATURATION: 97 % | HEIGHT: 71 IN | DIASTOLIC BLOOD PRESSURE: 80 MMHG | HEART RATE: 55 BPM | TEMPERATURE: 98.3 F | RESPIRATION RATE: 16 BRPM | BODY MASS INDEX: 28.14 KG/M2 | SYSTOLIC BLOOD PRESSURE: 125 MMHG

## 2023-08-22 DIAGNOSIS — Z85.46 HISTORY OF PROSTATE CANCER: ICD-10-CM

## 2023-08-22 DIAGNOSIS — Z23 IMMUNIZATION DUE: ICD-10-CM

## 2023-08-22 DIAGNOSIS — E78.2 MIXED HYPERLIPIDEMIA: ICD-10-CM

## 2023-08-22 DIAGNOSIS — I10 PRIMARY HYPERTENSION: ICD-10-CM

## 2023-08-22 DIAGNOSIS — E03.9 ACQUIRED HYPOTHYROIDISM: ICD-10-CM

## 2023-08-22 DIAGNOSIS — Q89.01 ASPLENIA: Primary | ICD-10-CM

## 2023-08-22 DIAGNOSIS — E55.9 VITAMIN D DEFICIENCY, UNSPECIFIED: ICD-10-CM

## 2023-08-22 PROCEDURE — G8428 CUR MEDS NOT DOCUMENT: HCPCS | Performed by: FAMILY MEDICINE

## 2023-08-22 PROCEDURE — 1036F TOBACCO NON-USER: CPT | Performed by: FAMILY MEDICINE

## 2023-08-22 PROCEDURE — G8419 CALC BMI OUT NRM PARAM NOF/U: HCPCS | Performed by: FAMILY MEDICINE

## 2023-08-22 PROCEDURE — 99214 OFFICE O/P EST MOD 30 MIN: CPT | Performed by: FAMILY MEDICINE

## 2023-08-22 PROCEDURE — 1123F ACP DISCUSS/DSCN MKR DOCD: CPT | Performed by: FAMILY MEDICINE

## 2023-08-22 PROCEDURE — 3079F DIAST BP 80-89 MM HG: CPT | Performed by: FAMILY MEDICINE

## 2023-08-22 PROCEDURE — 3017F COLORECTAL CA SCREEN DOC REV: CPT | Performed by: FAMILY MEDICINE

## 2023-08-22 PROCEDURE — 3074F SYST BP LT 130 MM HG: CPT | Performed by: FAMILY MEDICINE

## 2023-08-22 ASSESSMENT — ENCOUNTER SYMPTOMS
CONSTIPATION: 1
ABDOMINAL DISTENTION: 0
CHEST TIGHTNESS: 0

## 2023-08-22 NOTE — PROGRESS NOTES
[facility]    History of Present Illness:   Skip Suazo is a 72 y.o. male with history of HTN, CVA, Hypothyroidism, DDD, Prostate Cancer, Vitamin D deficiency  CC: Follow up  History provided by patient and Records    HPI:    Constipation:  Duration: 6 months  Quality: less than 3 per week, normal stool with straining  Episode duration: day(s)  Severity:  moderate  Exacerbating Factors: nothing  Associated Symptoms: Endorses None Denies fevers, bloody stools, nausea, vomiting  Known Risk Factors: calcium channel blocker  Food Factors:  Patient does admit to Tries to get fiber  Recent Sick Contacts: No   Current Medication: stool softner     Hypertension Follow up: The patient reports:  taking medications as instructed, no medication side effects noted, no TIA's, no chest pain on exertion, no dyspnea on exertion, no swelling of ankles, no orthostatic dizziness or lightheadedness, no orthopnea or paroxysmal nocturnal dyspnea. BP Readings from Last 3 Encounters:   08/22/23 125/80   05/15/23 129/78   02/13/23 (!) 145/82      Hypertriglyceridemia Follow up:   Cardiovascular risks for him are: hypertension  hyperlipidemia. Current Medications:  rosuvastatin - 10 MG    Compliance: Yes   Myalgias: No   Fatigue: No   Other side effects: No     Wt Readings from Last 3 Encounters:   08/22/23 201 lb (91.2 kg)   05/15/23 204 lb 12.8 oz (92.9 kg)   02/13/23 207 lb 12.8 oz (94.3 kg)     Lab Results   Component Value Date/Time    CHOL 166 05/15/2023 10:55 AM    HDL 69 05/15/2023 10:55 AM    VLDL 14 02/08/2022 12:00 AM      Lab Results   Component Value Date/Time    ALT 28 05/15/2023 10:55 AM    AST 33 05/15/2023 10:55 AM       Thyroid Disease Follow up:  Currently takes Levothyroxine 150 mcg. Thyroid medication has been has not been changed since last medication check and labs.   Patient denies fatigue, nervousness, weight changes, heat or cold intolerance, bowel changes, skin changes,

## 2023-08-23 LAB
25(OH)D3 SERPL-MCNC: 55.3 NG/ML (ref 30–100)
ALBUMIN SERPL-MCNC: 3.7 G/DL (ref 3.5–5)
ALBUMIN/GLOB SERPL: 1 (ref 1.1–2.2)
ALP SERPL-CCNC: 68 U/L (ref 45–117)
ALT SERPL-CCNC: 25 U/L (ref 12–78)
ANION GAP SERPL CALC-SCNC: 4 MMOL/L (ref 5–15)
AST SERPL-CCNC: 24 U/L (ref 15–37)
BILIRUB SERPL-MCNC: 0.9 MG/DL (ref 0.2–1)
BUN SERPL-MCNC: 12 MG/DL (ref 6–20)
BUN/CREAT SERPL: 16 (ref 12–20)
CALCIUM SERPL-MCNC: 10.1 MG/DL (ref 8.5–10.1)
CHLORIDE SERPL-SCNC: 110 MMOL/L (ref 97–108)
CHOLEST SERPL-MCNC: 137 MG/DL
CO2 SERPL-SCNC: 26 MMOL/L (ref 21–32)
CREAT SERPL-MCNC: 0.75 MG/DL (ref 0.7–1.3)
ERYTHROCYTE [DISTWIDTH] IN BLOOD BY AUTOMATED COUNT: 13.6 % (ref 11.5–14.5)
GLOBULIN SER CALC-MCNC: 3.7 G/DL (ref 2–4)
GLUCOSE SERPL-MCNC: 86 MG/DL (ref 65–100)
HCT VFR BLD AUTO: 42.3 % (ref 36.6–50.3)
HDLC SERPL-MCNC: 60 MG/DL
HDLC SERPL: 2.3 (ref 0–5)
HGB BLD-MCNC: 12.7 G/DL (ref 12.1–17)
LDLC SERPL CALC-MCNC: 63.8 MG/DL (ref 0–100)
MCH RBC QN AUTO: 29.3 PG (ref 26–34)
MCHC RBC AUTO-ENTMCNC: 30 G/DL (ref 30–36.5)
MCV RBC AUTO: 97.5 FL (ref 80–99)
NRBC # BLD: 0 K/UL (ref 0–0.01)
NRBC BLD-RTO: 0 PER 100 WBC
PLATELET # BLD AUTO: 268 K/UL (ref 150–400)
PMV BLD AUTO: 11.3 FL (ref 8.9–12.9)
POTASSIUM SERPL-SCNC: 4.6 MMOL/L (ref 3.5–5.1)
PROT SERPL-MCNC: 7.4 G/DL (ref 6.4–8.2)
RBC # BLD AUTO: 4.34 M/UL (ref 4.1–5.7)
SODIUM SERPL-SCNC: 140 MMOL/L (ref 136–145)
T4 FREE SERPL-MCNC: 1.3 NG/DL (ref 0.8–1.5)
TRIGL SERPL-MCNC: 66 MG/DL
TSH SERPL DL<=0.05 MIU/L-ACNC: 0.67 UIU/ML (ref 0.36–3.74)
VLDLC SERPL CALC-MCNC: 13.2 MG/DL
WBC # BLD AUTO: 6 K/UL (ref 4.1–11.1)

## 2023-08-24 LAB
PSA SERPL-MCNC: <0.1 NG/ML (ref 0–4)
REFLEX CRITERIA: NORMAL

## 2023-09-14 DIAGNOSIS — E03.9 ACQUIRED HYPOTHYROIDISM: ICD-10-CM

## 2023-09-15 RX ORDER — LEVOTHYROXINE SODIUM 150 MCG
150 TABLET ORAL
Qty: 90 TABLET | Refills: 1 | Status: SHIPPED | OUTPATIENT
Start: 2023-09-15

## 2023-09-25 ENCOUNTER — IMMUNIZATION (OUTPATIENT)
Facility: CLINIC | Age: 73
End: 2023-09-25
Payer: MEDICARE

## 2023-09-25 VITALS
DIASTOLIC BLOOD PRESSURE: 79 MMHG | HEART RATE: 71 BPM | OXYGEN SATURATION: 96 % | RESPIRATION RATE: 16 BRPM | TEMPERATURE: 98.5 F | SYSTOLIC BLOOD PRESSURE: 144 MMHG

## 2023-09-25 PROCEDURE — G0008 ADMIN INFLUENZA VIRUS VAC: HCPCS | Performed by: FAMILY MEDICINE

## 2023-09-25 PROCEDURE — 90694 VACC AIIV4 NO PRSRV 0.5ML IM: CPT | Performed by: FAMILY MEDICINE

## 2023-11-22 ENCOUNTER — OFFICE VISIT (OUTPATIENT)
Facility: CLINIC | Age: 73
End: 2023-11-22

## 2023-11-22 VITALS
BODY MASS INDEX: 28.53 KG/M2 | WEIGHT: 203.8 LBS | TEMPERATURE: 97.5 F | HEART RATE: 58 BPM | RESPIRATION RATE: 14 BRPM | SYSTOLIC BLOOD PRESSURE: 129 MMHG | DIASTOLIC BLOOD PRESSURE: 76 MMHG | OXYGEN SATURATION: 97 % | HEIGHT: 71 IN

## 2023-11-22 DIAGNOSIS — E55.9 VITAMIN D DEFICIENCY, UNSPECIFIED: ICD-10-CM

## 2023-11-22 DIAGNOSIS — Z00.00 MEDICARE ANNUAL WELLNESS VISIT, SUBSEQUENT: Primary | ICD-10-CM

## 2023-11-22 DIAGNOSIS — E78.2 MIXED HYPERLIPIDEMIA: ICD-10-CM

## 2023-11-22 DIAGNOSIS — I10 PRIMARY HYPERTENSION: ICD-10-CM

## 2023-11-22 DIAGNOSIS — J41.0 SIMPLE CHRONIC BRONCHITIS (HCC): ICD-10-CM

## 2023-11-22 DIAGNOSIS — E03.9 ACQUIRED HYPOTHYROIDISM: ICD-10-CM

## 2023-11-22 LAB
ERYTHROCYTE [DISTWIDTH] IN BLOOD BY AUTOMATED COUNT: 14 % (ref 11.5–14.5)
HCT VFR BLD AUTO: 40.3 % (ref 36.6–50.3)
HGB BLD-MCNC: 12.6 G/DL (ref 12.1–17)
MCH RBC QN AUTO: 29.2 PG (ref 26–34)
MCHC RBC AUTO-ENTMCNC: 31.3 G/DL (ref 30–36.5)
MCV RBC AUTO: 93.5 FL (ref 80–99)
NRBC # BLD: 0 K/UL (ref 0–0.01)
NRBC BLD-RTO: 0 PER 100 WBC
PLATELET # BLD AUTO: 240 K/UL (ref 150–400)
PMV BLD AUTO: 11 FL (ref 8.9–12.9)
RBC # BLD AUTO: 4.31 M/UL (ref 4.1–5.7)
WBC # BLD AUTO: 5 K/UL (ref 4.1–11.1)

## 2023-11-22 ASSESSMENT — ENCOUNTER SYMPTOMS
CHEST TIGHTNESS: 0
ABDOMINAL PAIN: 0

## 2023-11-22 ASSESSMENT — PATIENT HEALTH QUESTIONNAIRE - PHQ9
SUM OF ALL RESPONSES TO PHQ QUESTIONS 1-9: 0
SUM OF ALL RESPONSES TO PHQ QUESTIONS 1-9: 0
2. FEELING DOWN, DEPRESSED OR HOPELESS: 0
SUM OF ALL RESPONSES TO PHQ QUESTIONS 1-9: 0
SUM OF ALL RESPONSES TO PHQ QUESTIONS 1-9: 0
1. LITTLE INTEREST OR PLEASURE IN DOING THINGS: 0
SUM OF ALL RESPONSES TO PHQ QUESTIONS 1-9: 0
SUM OF ALL RESPONSES TO PHQ QUESTIONS 1-9: 0
SUM OF ALL RESPONSES TO PHQ9 QUESTIONS 1 & 2: 0
SUM OF ALL RESPONSES TO PHQ QUESTIONS 1-9: 0
2. FEELING DOWN, DEPRESSED OR HOPELESS: 0
SUM OF ALL RESPONSES TO PHQ9 QUESTIONS 1 & 2: 0
1. LITTLE INTEREST OR PLEASURE IN DOING THINGS: 0
SUM OF ALL RESPONSES TO PHQ QUESTIONS 1-9: 0

## 2023-11-22 ASSESSMENT — LIFESTYLE VARIABLES
HOW OFTEN DO YOU HAVE A DRINK CONTAINING ALCOHOL: 2-4 TIMES A MONTH
HOW MANY STANDARD DRINKS CONTAINING ALCOHOL DO YOU HAVE ON A TYPICAL DAY: 1 OR 2

## 2023-11-22 NOTE — PROGRESS NOTES
[facility]    History of Present Illness:   Alexa Ruelas is a 72 y.o. male with history of HTN, CVA, Hypothyroidism, DDD, Prostate Cancer, Vitamin D deficiency  CC: Follow up  History provided by patient and Records    HPI:  Ear Buzzing: Noting present for 1-2 months. Is a constant buzzing tone, denies headaches or dizziness. Hypertension Follow up: The patient reports:  taking medications as instructed, no medication side effects noted, no TIA's, no chest pain on exertion, no dyspnea on exertion, no swelling of ankles, no orthostatic dizziness or lightheadedness, no orthopnea or paroxysmal nocturnal dyspnea. BP Readings from Last 3 Encounters:   11/22/23 (!) 152/81   09/25/23 (!) 144/79   08/22/23 125/80      Hypertriglyceridemia Follow up:   Cardiovascular risks for him are: hypertension  hyperlipidemia. Current Medications:  rosuvastatin - 10 MG    Compliance: Yes   Myalgias: No   Fatigue: No   Other side effects: No     Wt Readings from Last 3 Encounters:   11/22/23 92.4 kg (203 lb 12.8 oz)   08/22/23 91.2 kg (201 lb)   05/15/23 92.9 kg (204 lb 12.8 oz)       Lab Results   Component Value Date/Time    CHOL 137 08/22/2023 10:15 AM    HDL 60 08/22/2023 10:15 AM    VLDL 14 02/08/2022 12:00 AM      Lab Results   Component Value Date/Time    ALT 25 08/22/2023 10:15 AM    AST 24 08/22/2023 10:15 AM       Thyroid Disease Follow up:  Currently takes Levothyroxine 150 mcg. Thyroid medication has been has not been changed since last medication check and labs. Patient denies fatigue, nervousness, weight changes, heat or cold intolerance, bowel changes, skin changes, cardiovascular symptoms, hair loss, feeling excessive energy, tremor, palpitations, and/or weight loss.     Lab Results   Component Value Date/Time    TSH 0.63 01/11/2023 09:50 AM     COPD: Doing well at this time     Health Maintenance  Health Maintenance Due   Topic Date Due    Hib vaccine (1 of 1 - Risk 1-dose series)

## 2023-11-23 LAB
25(OH)D3 SERPL-MCNC: 63.1 NG/ML (ref 30–100)
ALBUMIN SERPL-MCNC: 4 G/DL (ref 3.5–5)
ALBUMIN/GLOB SERPL: 1.1 (ref 1.1–2.2)
ALP SERPL-CCNC: 67 U/L (ref 45–117)
ALT SERPL-CCNC: 22 U/L (ref 12–78)
ANION GAP SERPL CALC-SCNC: 2 MMOL/L (ref 5–15)
AST SERPL-CCNC: 24 U/L (ref 15–37)
BILIRUB SERPL-MCNC: 0.9 MG/DL (ref 0.2–1)
BUN SERPL-MCNC: 12 MG/DL (ref 6–20)
BUN/CREAT SERPL: 16 (ref 12–20)
CALCIUM SERPL-MCNC: 10.3 MG/DL (ref 8.5–10.1)
CHLORIDE SERPL-SCNC: 111 MMOL/L (ref 97–108)
CHOLEST SERPL-MCNC: 155 MG/DL
CO2 SERPL-SCNC: 28 MMOL/L (ref 21–32)
CREAT SERPL-MCNC: 0.74 MG/DL (ref 0.7–1.3)
GLOBULIN SER CALC-MCNC: 3.7 G/DL (ref 2–4)
GLUCOSE SERPL-MCNC: 101 MG/DL (ref 65–100)
HDLC SERPL-MCNC: 64 MG/DL
HDLC SERPL: 2.4 (ref 0–5)
LDLC SERPL CALC-MCNC: 81.6 MG/DL (ref 0–100)
POTASSIUM SERPL-SCNC: 5 MMOL/L (ref 3.5–5.1)
PROT SERPL-MCNC: 7.7 G/DL (ref 6.4–8.2)
SODIUM SERPL-SCNC: 141 MMOL/L (ref 136–145)
T4 FREE SERPL-MCNC: 1.3 NG/DL (ref 0.8–1.5)
TRIGL SERPL-MCNC: 47 MG/DL
TSH SERPL DL<=0.05 MIU/L-ACNC: 0.57 UIU/ML (ref 0.36–3.74)
VLDLC SERPL CALC-MCNC: 9.4 MG/DL

## 2023-12-15 DIAGNOSIS — E78.2 MIXED HYPERLIPIDEMIA: ICD-10-CM

## 2023-12-15 RX ORDER — ROSUVASTATIN CALCIUM 10 MG/1
TABLET, COATED ORAL
Qty: 36 TABLET | Refills: 1 | Status: SHIPPED | OUTPATIENT
Start: 2023-12-15

## 2024-02-06 DIAGNOSIS — I10 PRIMARY HYPERTENSION: ICD-10-CM

## 2024-02-06 RX ORDER — AMLODIPINE BESYLATE 2.5 MG/1
2.5 TABLET ORAL DAILY
Qty: 90 TABLET | Refills: 1 | Status: SHIPPED | OUTPATIENT
Start: 2024-02-06

## 2024-02-22 ENCOUNTER — OFFICE VISIT (OUTPATIENT)
Facility: CLINIC | Age: 74
End: 2024-02-22
Payer: MEDICARE

## 2024-02-22 VITALS
TEMPERATURE: 97.4 F | BODY MASS INDEX: 27.89 KG/M2 | HEART RATE: 57 BPM | RESPIRATION RATE: 18 BRPM | HEIGHT: 71 IN | DIASTOLIC BLOOD PRESSURE: 79 MMHG | SYSTOLIC BLOOD PRESSURE: 131 MMHG | OXYGEN SATURATION: 98 % | WEIGHT: 199.2 LBS

## 2024-02-22 DIAGNOSIS — M54.42 CHRONIC MIDLINE LOW BACK PAIN WITH LEFT-SIDED SCIATICA: ICD-10-CM

## 2024-02-22 DIAGNOSIS — I10 PRIMARY HYPERTENSION: Primary | ICD-10-CM

## 2024-02-22 DIAGNOSIS — G89.29 CHRONIC MIDLINE LOW BACK PAIN WITH LEFT-SIDED SCIATICA: ICD-10-CM

## 2024-02-22 DIAGNOSIS — Z85.46 HISTORY OF PROSTATE CANCER: ICD-10-CM

## 2024-02-22 DIAGNOSIS — R04.0 BLEEDING NOSE: ICD-10-CM

## 2024-02-22 DIAGNOSIS — J41.0 SIMPLE CHRONIC BRONCHITIS (HCC): ICD-10-CM

## 2024-02-22 DIAGNOSIS — E78.2 MIXED HYPERLIPIDEMIA: ICD-10-CM

## 2024-02-22 DIAGNOSIS — E03.9 ACQUIRED HYPOTHYROIDISM: ICD-10-CM

## 2024-02-22 PROCEDURE — 3023F SPIROM DOC REV: CPT | Performed by: FAMILY MEDICINE

## 2024-02-22 PROCEDURE — 1036F TOBACCO NON-USER: CPT | Performed by: FAMILY MEDICINE

## 2024-02-22 PROCEDURE — 3075F SYST BP GE 130 - 139MM HG: CPT | Performed by: FAMILY MEDICINE

## 2024-02-22 PROCEDURE — 99214 OFFICE O/P EST MOD 30 MIN: CPT | Performed by: FAMILY MEDICINE

## 2024-02-22 PROCEDURE — 1123F ACP DISCUSS/DSCN MKR DOCD: CPT | Performed by: FAMILY MEDICINE

## 2024-02-22 PROCEDURE — 3017F COLORECTAL CA SCREEN DOC REV: CPT | Performed by: FAMILY MEDICINE

## 2024-02-22 PROCEDURE — G8419 CALC BMI OUT NRM PARAM NOF/U: HCPCS | Performed by: FAMILY MEDICINE

## 2024-02-22 PROCEDURE — G8484 FLU IMMUNIZE NO ADMIN: HCPCS | Performed by: FAMILY MEDICINE

## 2024-02-22 PROCEDURE — G8427 DOCREV CUR MEDS BY ELIG CLIN: HCPCS | Performed by: FAMILY MEDICINE

## 2024-02-22 PROCEDURE — 3078F DIAST BP <80 MM HG: CPT | Performed by: FAMILY MEDICINE

## 2024-02-22 ASSESSMENT — ENCOUNTER SYMPTOMS
BACK PAIN: 0
ANAL BLEEDING: 0
CHEST TIGHTNESS: 0

## 2024-02-22 ASSESSMENT — PATIENT HEALTH QUESTIONNAIRE - PHQ9
SUM OF ALL RESPONSES TO PHQ QUESTIONS 1-9: 0
SUM OF ALL RESPONSES TO PHQ9 QUESTIONS 1 & 2: 0
SUM OF ALL RESPONSES TO PHQ QUESTIONS 1-9: 0
2. FEELING DOWN, DEPRESSED OR HOPELESS: 0
1. LITTLE INTEREST OR PLEASURE IN DOING THINGS: 0
SUM OF ALL RESPONSES TO PHQ QUESTIONS 1-9: 0
SUM OF ALL RESPONSES TO PHQ QUESTIONS 1-9: 0

## 2024-02-22 NOTE — PROGRESS NOTES
1. \"Have you been to the ER, urgent care clinic since your last visit?  Hospitalized since your last visit?\" no    2. \"Have you seen or consulted any other health care providers outside of the [facility] since your last visit?\" no         Health Maintenance Due   Topic Date Due    Hib vaccine (1 of 1 - Risk 1-dose series) Never done    Meningococcal (ACWY) vaccine (1 - Risk 2-dose series) Never done    Meningococcal B vaccine (1 of 4 - Increased Risk) Never done    Shingles vaccine (1 of 2) Never done    Respiratory Syncytial Virus (RSV) Pregnant or age 60 yrs+ (1 - 1-dose 60+ series) Never done    Pneumococcal 65+ years Vaccine (3 - PPSV23 or PCV20) 09/01/2020    COVID-19 Vaccine (6 - 2023-24 season) 09/01/2023    Annual Wellness Visit (Medicare Advantage)  01/01/2024      
Increased Risk) Never done    Shingles vaccine (1 of 2) Never done    Respiratory Syncytial Virus (RSV) Pregnant or age 60 yrs+ (1 - 1-dose 60+ series) Never done    Pneumococcal 65+ years Vaccine (3 - PPSV23 or PCV20) 09/01/2020    COVID-19 Vaccine (6 - 2023-24 season) 09/01/2023    Annual Wellness Visit (Medicare Advantage)  01/01/2024       Past Medical, Family, and Social History:     Current Outpatient Medications on File Prior to Visit   Medication Sig Dispense Refill    amLODIPine (NORVASC) 2.5 MG tablet TAKE ONE TABLET BY MOUTH EVERY DAY 90 tablet 1    rosuvastatin (CRESTOR) 10 MG tablet TAKE ONE TABLET BY MOUTH NIGHTLY ON MONDAY, WEDNESDAY AND FRIDAY OF EACH WEEK 36 tablet 1    SYNTHROID 150 MCG tablet TAKE 1 TABLET BY MOUTH DAILY BEFORE BREAKFAST 90 tablet 1    fluticasone (FLONASE) 50 MCG/ACT nasal spray USE 2 SPRAYS IN EACH NOSTRIL DAILY 16 g 5    VITAMIN D, CHOLECALCIFEROL, PO Take by mouth      Omega-3 Fatty Acids (FISH OIL) 1000 MG capsule Take by mouth daily      tadalafil (CIALIS) 5 MG tablet Take 1 tablet by mouth as needed for Erectile Dysfunction Take 30 minutes prior to intercourse 30 tablet 1    cyanocobalamin 1000 MCG tablet Take 1 tablet by mouth daily 90 tablet 1    DOCUSATE SODIUM PO Take by mouth daily      aspirin 325 MG tablet Take by mouth daily      budesonide (PULMICORT) 180 MCG/ACT AEPB inhaler Inhale 1 puff into the lungs 2 times daily      diclofenac sodium (VOLTAREN) 1 % GEL Apply topically every 6 hours       No current facility-administered medications on file prior to visit.       Patient Active Problem List   Diagnosis    Chronic midline low back pain with left-sided sciatica    Thyroglossal duct cyst    Hypothyroidism    Weakness    Benign neoplasm of colon    Internal hemorrhoid    Poor historian    Mixed hyperlipidemia    Spinal stenosis of cervical region    Pain    Keloid    Shoulder-hand syndrome    Hypertension    History of prostate cancer    History of CVA

## 2024-02-23 LAB
ALBUMIN SERPL-MCNC: 3.9 G/DL (ref 3.5–5)
ALBUMIN/GLOB SERPL: 1.1 (ref 1.1–2.2)
ALP SERPL-CCNC: 66 U/L (ref 45–117)
ALT SERPL-CCNC: 21 U/L (ref 12–78)
ANION GAP SERPL CALC-SCNC: 1 MMOL/L (ref 5–15)
AST SERPL-CCNC: 18 U/L (ref 15–37)
BILIRUB SERPL-MCNC: 1.1 MG/DL (ref 0.2–1)
BUN SERPL-MCNC: 13 MG/DL (ref 6–20)
BUN/CREAT SERPL: 15 (ref 12–20)
CALCIUM SERPL-MCNC: 10.5 MG/DL (ref 8.5–10.1)
CHLORIDE SERPL-SCNC: 112 MMOL/L (ref 97–108)
CHOLEST SERPL-MCNC: 150 MG/DL
CO2 SERPL-SCNC: 25 MMOL/L (ref 21–32)
CREAT SERPL-MCNC: 0.86 MG/DL (ref 0.7–1.3)
ERYTHROCYTE [DISTWIDTH] IN BLOOD BY AUTOMATED COUNT: 13.4 % (ref 11.5–14.5)
GLOBULIN SER CALC-MCNC: 3.6 G/DL (ref 2–4)
GLUCOSE SERPL-MCNC: 115 MG/DL (ref 65–100)
HCT VFR BLD AUTO: 39.7 % (ref 36.6–50.3)
HDLC SERPL-MCNC: 59 MG/DL
HDLC SERPL: 2.5 (ref 0–5)
HGB BLD-MCNC: 12.8 G/DL (ref 12.1–17)
LDLC SERPL CALC-MCNC: 80.4 MG/DL (ref 0–100)
MCH RBC QN AUTO: 29.9 PG (ref 26–34)
MCHC RBC AUTO-ENTMCNC: 32.2 G/DL (ref 30–36.5)
MCV RBC AUTO: 92.8 FL (ref 80–99)
NRBC # BLD: 0 K/UL (ref 0–0.01)
NRBC BLD-RTO: 0 PER 100 WBC
PLATELET # BLD AUTO: 250 K/UL (ref 150–400)
PMV BLD AUTO: 11.1 FL (ref 8.9–12.9)
POTASSIUM SERPL-SCNC: 3.8 MMOL/L (ref 3.5–5.1)
PROT SERPL-MCNC: 7.5 G/DL (ref 6.4–8.2)
RBC # BLD AUTO: 4.28 M/UL (ref 4.1–5.7)
SODIUM SERPL-SCNC: 138 MMOL/L (ref 136–145)
T4 FREE SERPL-MCNC: 1.3 NG/DL (ref 0.8–1.5)
TRIGL SERPL-MCNC: 53 MG/DL
TSH SERPL DL<=0.05 MIU/L-ACNC: 0.05 UIU/ML (ref 0.36–3.74)
VLDLC SERPL CALC-MCNC: 10.6 MG/DL
WBC # BLD AUTO: 5.4 K/UL (ref 4.1–11.1)

## 2024-02-26 DIAGNOSIS — E03.9 ACQUIRED HYPOTHYROIDISM: Primary | ICD-10-CM

## 2024-02-26 DIAGNOSIS — E83.52 SERUM CALCIUM ELEVATED: ICD-10-CM

## 2024-03-18 DIAGNOSIS — E03.9 ACQUIRED HYPOTHYROIDISM: ICD-10-CM

## 2024-03-18 DIAGNOSIS — E83.52 SERUM CALCIUM ELEVATED: ICD-10-CM

## 2024-03-19 LAB
CALCIUM SERPL-MCNC: 9.9 MG/DL (ref 8.5–10.1)
PTH-INTACT SERPL-MCNC: 53.5 PG/ML (ref 18.4–88)
T4 FREE SERPL-MCNC: 1.3 NG/DL (ref 0.8–1.5)
TSH SERPL DL<=0.05 MIU/L-ACNC: 0.08 UIU/ML (ref 0.36–3.74)

## 2024-03-20 ENCOUNTER — TELEPHONE (OUTPATIENT)
Facility: CLINIC | Age: 74
End: 2024-03-20

## 2024-03-20 DIAGNOSIS — E03.9 ACQUIRED HYPOTHYROIDISM: Primary | ICD-10-CM

## 2024-03-20 RX ORDER — LEVOTHYROXINE SODIUM 137 UG/1
137 TABLET ORAL DAILY
Qty: 90 TABLET | Refills: 1 | Status: SHIPPED | OUTPATIENT
Start: 2024-03-20

## 2024-03-20 NOTE — TELEPHONE ENCOUNTER
Called patient, discussed labs, decreasing synthroid dose to 137 mcg.    Christopher Perez MD  [facility]  03/20/24

## 2024-04-30 DIAGNOSIS — J41.0 SIMPLE CHRONIC BRONCHITIS (HCC): ICD-10-CM

## 2024-04-30 RX ORDER — BUDESONIDE 180 UG/1
1 AEROSOL, POWDER RESPIRATORY (INHALATION) 2 TIMES DAILY
Qty: 1 EACH | Refills: 5 | Status: SHIPPED | OUTPATIENT
Start: 2024-04-30

## 2024-05-19 SDOH — ECONOMIC STABILITY: FOOD INSECURITY: WITHIN THE PAST 12 MONTHS, YOU WORRIED THAT YOUR FOOD WOULD RUN OUT BEFORE YOU GOT MONEY TO BUY MORE.: NEVER TRUE

## 2024-05-19 SDOH — ECONOMIC STABILITY: INCOME INSECURITY: HOW HARD IS IT FOR YOU TO PAY FOR THE VERY BASICS LIKE FOOD, HOUSING, MEDICAL CARE, AND HEATING?: NOT VERY HARD

## 2024-05-19 SDOH — ECONOMIC STABILITY: FOOD INSECURITY: WITHIN THE PAST 12 MONTHS, THE FOOD YOU BOUGHT JUST DIDN'T LAST AND YOU DIDN'T HAVE MONEY TO GET MORE.: NEVER TRUE

## 2024-05-22 ENCOUNTER — OFFICE VISIT (OUTPATIENT)
Facility: CLINIC | Age: 74
End: 2024-05-22
Payer: MEDICARE

## 2024-05-22 VITALS
BODY MASS INDEX: 27.69 KG/M2 | TEMPERATURE: 97.3 F | HEART RATE: 57 BPM | HEIGHT: 71 IN | WEIGHT: 197.8 LBS | OXYGEN SATURATION: 98 % | RESPIRATION RATE: 18 BRPM | DIASTOLIC BLOOD PRESSURE: 82 MMHG | SYSTOLIC BLOOD PRESSURE: 137 MMHG

## 2024-05-22 DIAGNOSIS — M54.42 CHRONIC MIDLINE LOW BACK PAIN WITH LEFT-SIDED SCIATICA: ICD-10-CM

## 2024-05-22 DIAGNOSIS — Z85.46 HISTORY OF PROSTATE CANCER: ICD-10-CM

## 2024-05-22 DIAGNOSIS — L91.0 KELOID: ICD-10-CM

## 2024-05-22 DIAGNOSIS — Z86.73 HISTORY OF CVA (CEREBROVASCULAR ACCIDENT): ICD-10-CM

## 2024-05-22 DIAGNOSIS — E78.2 MIXED HYPERLIPIDEMIA: ICD-10-CM

## 2024-05-22 DIAGNOSIS — E03.9 ACQUIRED HYPOTHYROIDISM: ICD-10-CM

## 2024-05-22 DIAGNOSIS — I10 PRIMARY HYPERTENSION: Primary | ICD-10-CM

## 2024-05-22 DIAGNOSIS — G89.29 CHRONIC MIDLINE LOW BACK PAIN WITH LEFT-SIDED SCIATICA: ICD-10-CM

## 2024-05-22 DIAGNOSIS — Z00.00 MEDICARE ANNUAL WELLNESS VISIT, SUBSEQUENT: ICD-10-CM

## 2024-05-22 DIAGNOSIS — J41.0 SIMPLE CHRONIC BRONCHITIS (HCC): ICD-10-CM

## 2024-05-22 PROCEDURE — 3075F SYST BP GE 130 - 139MM HG: CPT | Performed by: FAMILY MEDICINE

## 2024-05-22 PROCEDURE — 1123F ACP DISCUSS/DSCN MKR DOCD: CPT | Performed by: FAMILY MEDICINE

## 2024-05-22 PROCEDURE — G0439 PPPS, SUBSEQ VISIT: HCPCS | Performed by: FAMILY MEDICINE

## 2024-05-22 PROCEDURE — 99214 OFFICE O/P EST MOD 30 MIN: CPT | Performed by: FAMILY MEDICINE

## 2024-05-22 PROCEDURE — 3017F COLORECTAL CA SCREEN DOC REV: CPT | Performed by: FAMILY MEDICINE

## 2024-05-22 PROCEDURE — G8419 CALC BMI OUT NRM PARAM NOF/U: HCPCS | Performed by: FAMILY MEDICINE

## 2024-05-22 PROCEDURE — 3079F DIAST BP 80-89 MM HG: CPT | Performed by: FAMILY MEDICINE

## 2024-05-22 PROCEDURE — 3023F SPIROM DOC REV: CPT | Performed by: FAMILY MEDICINE

## 2024-05-22 PROCEDURE — G8427 DOCREV CUR MEDS BY ELIG CLIN: HCPCS | Performed by: FAMILY MEDICINE

## 2024-05-22 PROCEDURE — 1036F TOBACCO NON-USER: CPT | Performed by: FAMILY MEDICINE

## 2024-05-22 RX ORDER — ROSUVASTATIN CALCIUM 10 MG/1
TABLET, COATED ORAL
Qty: 36 TABLET | Refills: 1 | Status: SHIPPED | OUTPATIENT
Start: 2024-05-22

## 2024-05-22 ASSESSMENT — ANXIETY QUESTIONNAIRES
3. WORRYING TOO MUCH ABOUT DIFFERENT THINGS: NOT AT ALL
1. FEELING NERVOUS, ANXIOUS, OR ON EDGE: NOT AT ALL
4. TROUBLE RELAXING: NOT AT ALL
5. BEING SO RESTLESS THAT IT IS HARD TO SIT STILL: NOT AT ALL
6. BECOMING EASILY ANNOYED OR IRRITABLE: NOT AT ALL
IF YOU CHECKED OFF ANY PROBLEMS ON THIS QUESTIONNAIRE, HOW DIFFICULT HAVE THESE PROBLEMS MADE IT FOR YOU TO DO YOUR WORK, TAKE CARE OF THINGS AT HOME, OR GET ALONG WITH OTHER PEOPLE: NOT DIFFICULT AT ALL
GAD7 TOTAL SCORE: 0
7. FEELING AFRAID AS IF SOMETHING AWFUL MIGHT HAPPEN: NOT AT ALL
2. NOT BEING ABLE TO STOP OR CONTROL WORRYING: NOT AT ALL

## 2024-05-22 ASSESSMENT — PATIENT HEALTH QUESTIONNAIRE - PHQ9
2. FEELING DOWN, DEPRESSED OR HOPELESS: NOT AT ALL
SUM OF ALL RESPONSES TO PHQ QUESTIONS 1-9: 0
SUM OF ALL RESPONSES TO PHQ QUESTIONS 1-9: 0
SUM OF ALL RESPONSES TO PHQ9 QUESTIONS 1 & 2: 0
1. LITTLE INTEREST OR PLEASURE IN DOING THINGS: NOT AT ALL
SUM OF ALL RESPONSES TO PHQ QUESTIONS 1-9: 0
SUM OF ALL RESPONSES TO PHQ QUESTIONS 1-9: 0

## 2024-05-22 ASSESSMENT — ENCOUNTER SYMPTOMS
BACK PAIN: 0
CHEST TIGHTNESS: 0
ABDOMINAL PAIN: 0

## 2024-05-22 ASSESSMENT — LIFESTYLE VARIABLES
HOW MANY STANDARD DRINKS CONTAINING ALCOHOL DO YOU HAVE ON A TYPICAL DAY: 1 OR 2
HOW OFTEN DO YOU HAVE A DRINK CONTAINING ALCOHOL: MONTHLY OR LESS

## 2024-05-22 NOTE — PROGRESS NOTES
[facility]    History of Present Illness:   Moo Holley is a 74 y.o. male with history of  HTN, CVA, Hypothyroidism, DDD, Prostate Cancer, Vitamin D deficiency   CC: Medicare wellness/Follow up  History provided by patient and Records    HPI:  Radiculopathy: Patient noting persistent and worsening left arm mild weakness.  Has upcoming MRI through Neurology.  Noting needs labs to check kidney function.    Hypertension Follow up:  The patient reports:  taking medications as instructed, no medication side effects noted, no TIA's, no chest pain on exertion, no dyspnea on exertion, no swelling of ankles, no orthostatic dizziness or lightheadedness, no orthopnea or paroxysmal nocturnal dyspnea.     BP Readings from Last 3 Encounters:   05/22/24 137/82   02/22/24 131/79   11/22/23 129/76      COPD: Overall controlled with pulmicort    Thyroid Disease Follow up:  Currently takes Levothyroxine 137 mcg.  Thyroid medication has been changed since last medication check and labs.  Patient denies fatigue, nervousness, weight changes, heat or cold intolerance, bowel changes, skin changes, cardiovascular symptoms, hair loss, feeling excessive energy, tremor, palpitations, and/or weight loss.    Lab Results   Component Value Date/Time    TSH 0.63 01/11/2023 09:50 AM      Hypertriglyceridemia Follow up:   Cardiovascular risks for him are: hyperlipidemia.   Current Medications:  rosuvastatin - 10 MG    Compliance: Yes   Myalgias: No   Fatigue: No   Other side effects: No     Wt Readings from Last 3 Encounters:   05/22/24 89.7 kg (197 lb 12.8 oz)   02/22/24 90.4 kg (199 lb 3.2 oz)   11/22/23 92.4 kg (203 lb 12.8 oz)       Lab Results   Component Value Date/Time    CHOL 150 02/22/2024 10:00 AM    HDL 59 02/22/2024 10:00 AM    LDL 80.4 02/22/2024 10:00 AM    VLDL 10.6 02/22/2024 10:00 AM    VLDL 14 02/08/2022 12:00 AM      Lab Results   Component Value Date/Time    ALT 21 02/22/2024 10:00 AM    AST 18 
Taking? Authorizing Provider   rosuvastatin (CRESTOR) 10 MG tablet TAKE ONE TABLET BY MOUTH NIGHTLY ON MONDAY, WEDNESDAY AND FRIDAY OF EACH WEEK Yes Christopher Perez MD   PULMICORT FLEXHALER 180 MCG/ACT AEPB inhaler INHALE 1 PUFF BY MOUTH TWICE A DAY Yes Christopher Perez MD   levothyroxine (SYNTHROID) 137 MCG tablet Take 1 tablet by mouth daily Yes Christopher Perez MD   amLODIPine (NORVASC) 2.5 MG tablet TAKE ONE TABLET BY MOUTH EVERY DAY Yes Christopher Perez MD   fluticasone (FLONASE) 50 MCG/ACT nasal spray USE 2 SPRAYS IN EACH NOSTRIL DAILY Yes Christopher Perez MD   VITAMIN D, CHOLECALCIFEROL, PO Take by mouth Yes ProviderHebert MD   Omega-3 Fatty Acids (FISH OIL) 1000 MG capsule Take by mouth daily Yes ProviderHebert MD   tadalafil (CIALIS) 5 MG tablet Take 1 tablet by mouth as needed for Erectile Dysfunction Take 30 minutes prior to intercourse Yes Christopher Perez MD   cyanocobalamin 1000 MCG tablet Take 1 tablet by mouth daily Yes Christopher Perez MD   DOCUSATE SODIUM PO Take by mouth daily Yes Automatic Reconciliation, Ar   aspirin 325 MG tablet Take by mouth daily Yes Automatic Reconciliation, Ar   diclofenac sodium (VOLTAREN) 1 % GEL Apply topically every 6 hours Yes Automatic Reconciliation, Ar       CareTeam (Including outside providers/suppliers regularly involved in providing care):   Patient Care Team:  Christopher Perez MD as PCP - General  Christopher Perez MD as PCP - Empaneled Provider  Jose Miguel Magaña MD as Physician     Reviewed and updated this visit:  Tobacco  Allergies  Meds  Problems  Med Hx  Surg Hx  Soc Hx  Fam Hx

## 2024-05-22 NOTE — PATIENT INSTRUCTIONS
condition or this instruction, always ask your healthcare professional. [employer], [employer] disclaims any warranty or liability for your use of this information.      Personalized Preventive Plan for Moo Holley - 5/22/2024  Medicare offers a range of preventive health benefits. Some of the tests and screenings are paid in full while other may be subject to a deductible, co-insurance, and/or copay.    Some of these benefits include a comprehensive review of your medical history including lifestyle, illnesses that may run in your family, and various assessments and screenings as appropriate.    After reviewing your medical record and screening and assessments performed today your provider may have ordered immunizations, labs, imaging, and/or referrals for you.  A list of these orders (if applicable) as well as your Preventive Care list are included within your After Visit Summary for your review.    Other Preventive Recommendations:    A preventive eye exam performed by an eye specialist is recommended every 1-2 years to screen for glaucoma; cataracts, macular degeneration, and other eye disorders.  A preventive dental visit is recommended every 6 months.  Try to get at least 150 minutes of exercise per week or 10,000 steps per day on a pedometer .  Order or download the FREE \"Exercise & Physical Activity: Your Everyday Guide\" from [employer]. Call 1-327.905.8426 or search [employer] online.  You need 2309-9076 mg of calcium and 3219-9728 IU of vitamin D per day. It is possible to meet your calcium requirement with diet alone, but a vitamin D supplement is usually necessary to meet this goal.  When exposed to the sun, use a sunscreen that protects against both UVA and UVB radiation with an SPF of 30 or greater. Reapply every 2 to 3 hours or after sweating, drying off with a towel, or swimming.  Always wear a seat belt when traveling in a car. Always wear a helmet

## 2024-05-23 LAB
ALBUMIN SERPL-MCNC: 4 G/DL (ref 3.5–5)
ALBUMIN/GLOB SERPL: 1.1 (ref 1.1–2.2)
ALP SERPL-CCNC: 81 U/L (ref 45–117)
ALT SERPL-CCNC: 25 U/L (ref 12–78)
ANION GAP SERPL CALC-SCNC: 6 MMOL/L (ref 5–15)
AST SERPL-CCNC: 23 U/L (ref 15–37)
BILIRUB SERPL-MCNC: 0.9 MG/DL (ref 0.2–1)
BUN SERPL-MCNC: 14 MG/DL (ref 6–20)
BUN/CREAT SERPL: 15 (ref 12–20)
CALCIUM SERPL-MCNC: 10.8 MG/DL (ref 8.5–10.1)
CHLORIDE SERPL-SCNC: 109 MMOL/L (ref 97–108)
CHOLEST SERPL-MCNC: 152 MG/DL
CO2 SERPL-SCNC: 25 MMOL/L (ref 21–32)
CREAT SERPL-MCNC: 0.92 MG/DL (ref 0.7–1.3)
ERYTHROCYTE [DISTWIDTH] IN BLOOD BY AUTOMATED COUNT: 13.7 % (ref 11.5–14.5)
GLOBULIN SER CALC-MCNC: 3.7 G/DL (ref 2–4)
GLUCOSE SERPL-MCNC: 87 MG/DL (ref 65–100)
HCT VFR BLD AUTO: 41.5 % (ref 36.6–50.3)
HDLC SERPL-MCNC: 66 MG/DL
HDLC SERPL: 2.3 (ref 0–5)
HGB BLD-MCNC: 13 G/DL (ref 12.1–17)
LDLC SERPL CALC-MCNC: 73.4 MG/DL (ref 0–100)
MCH RBC QN AUTO: 29.7 PG (ref 26–34)
MCHC RBC AUTO-ENTMCNC: 31.3 G/DL (ref 30–36.5)
MCV RBC AUTO: 94.7 FL (ref 80–99)
NRBC # BLD: 0 K/UL (ref 0–0.01)
NRBC BLD-RTO: 0 PER 100 WBC
PLATELET # BLD AUTO: 266 K/UL (ref 150–400)
PMV BLD AUTO: 11.5 FL (ref 8.9–12.9)
POTASSIUM SERPL-SCNC: 4.6 MMOL/L (ref 3.5–5.1)
PROT SERPL-MCNC: 7.7 G/DL (ref 6.4–8.2)
RBC # BLD AUTO: 4.38 M/UL (ref 4.1–5.7)
SODIUM SERPL-SCNC: 140 MMOL/L (ref 136–145)
T4 FREE SERPL-MCNC: 1.2 NG/DL (ref 0.8–1.5)
TRIGL SERPL-MCNC: 63 MG/DL
TSH SERPL DL<=0.05 MIU/L-ACNC: 0.6 UIU/ML (ref 0.36–3.74)
VLDLC SERPL CALC-MCNC: 12.6 MG/DL
WBC # BLD AUTO: 6.3 K/UL (ref 4.1–11.1)

## 2024-06-25 ENCOUNTER — OFFICE VISIT (OUTPATIENT)
Facility: CLINIC | Age: 74
End: 2024-06-25
Payer: MEDICARE

## 2024-06-25 VITALS
BODY MASS INDEX: 28.36 KG/M2 | RESPIRATION RATE: 16 BRPM | TEMPERATURE: 98.6 F | HEART RATE: 62 BPM | DIASTOLIC BLOOD PRESSURE: 75 MMHG | OXYGEN SATURATION: 96 % | WEIGHT: 202.6 LBS | SYSTOLIC BLOOD PRESSURE: 129 MMHG | HEIGHT: 71 IN

## 2024-06-25 DIAGNOSIS — S60.10XD: Primary | ICD-10-CM

## 2024-06-25 DIAGNOSIS — L08.9 FINGER INFECTION: ICD-10-CM

## 2024-06-25 PROCEDURE — 3017F COLORECTAL CA SCREEN DOC REV: CPT | Performed by: FAMILY MEDICINE

## 2024-06-25 PROCEDURE — 1036F TOBACCO NON-USER: CPT | Performed by: FAMILY MEDICINE

## 2024-06-25 PROCEDURE — G8419 CALC BMI OUT NRM PARAM NOF/U: HCPCS | Performed by: FAMILY MEDICINE

## 2024-06-25 PROCEDURE — G8428 CUR MEDS NOT DOCUMENT: HCPCS | Performed by: FAMILY MEDICINE

## 2024-06-25 PROCEDURE — 99213 OFFICE O/P EST LOW 20 MIN: CPT | Performed by: FAMILY MEDICINE

## 2024-06-25 PROCEDURE — 1123F ACP DISCUSS/DSCN MKR DOCD: CPT | Performed by: FAMILY MEDICINE

## 2024-06-25 PROCEDURE — 3078F DIAST BP <80 MM HG: CPT | Performed by: FAMILY MEDICINE

## 2024-06-25 PROCEDURE — 3074F SYST BP LT 130 MM HG: CPT | Performed by: FAMILY MEDICINE

## 2024-06-25 ASSESSMENT — ENCOUNTER SYMPTOMS
APNEA: 0
BACK PAIN: 0

## 2024-06-25 NOTE — PROGRESS NOTES
Chief Complaint   Patient presents with    Finger Pain     Left index      \"Have you been to the ER, urgent care clinic since your last visit?  Hospitalized since your last visit?\"    NO    “Have you seen or consulted any other health care providers outside of [facility] since your last visit?”    NO            Click Here for Release of Records Request     Health Maintenance Due   Topic Date Due    Hib vaccine (1 of 1 - Risk 1-dose series) Never done    Meningococcal (ACWY) vaccine (1 - Risk 2-dose series) Never done    Meningococcal B vaccine (1 of 4 - Increased Risk) Never done    Shingles vaccine (1 of 2) Never done    Respiratory Syncytial Virus (RSV) Pregnant or age 60 yrs+ (1 - 1-dose 60+ series) Never done    Pneumococcal 65+ years Vaccine (3 of 3 - PPSV23 or PCV20) 09/01/2020    COVID-19 Vaccine (6 - 2023-24 season) 09/01/2023     
(Non-Medical): No   Physical Activity: Insufficiently Active (5/22/2024)    Exercise Vital Sign     Days of Exercise per Week: 2 days     Minutes of Exercise per Session: 20 min   Housing Stability: Unknown (5/19/2024)    Housing Stability Vital Sign     Unstable Housing in the Last Year: No        Review of Systems   Review of Systems   Constitutional:  Negative for activity change and appetite change.   Respiratory:  Negative for apnea.    Musculoskeletal:  Negative for arthralgias and back pain.   Neurological:  Negative for dizziness and facial asymmetry.         Objective:   /75 (Site: Left Upper Arm, Position: Sitting, Cuff Size: Large Adult)   Pulse 62   Temp 98.6 °F (37 °C) (Infrared)   Resp 16   Ht 1.803 m (5' 11\")   Wt 91.9 kg (202 lb 9.6 oz)   SpO2 96%   BMI 28.26 kg/m²      Physical Exam  Vitals and nursing note reviewed.   Constitutional:       Appearance: Normal appearance.   HENT:      Head: Normocephalic and atraumatic.   Cardiovascular:      Rate and Rhythm: Normal rate and regular rhythm.      Pulses: Normal pulses.      Heart sounds: Normal heart sounds.   Pulmonary:      Effort: Pulmonary effort is normal.      Breath sounds: Normal breath sounds.   Musculoskeletal:      Cervical back: Normal range of motion and neck supple.      Comments: Left pointer nail with nail abnormality with groove and heaped up scab material.   Neurological:      Mental Status: He is alert.          Pertinent Labs/Studies:      Assessment and orders:       ICD-10-CM    1. Finger nail contusion, subsequent encounter  S60.10XD mupirocin (BACTROBAN) 2 % ointment      2. Finger infection  L08.9 mupirocin (BACTROBAN) 2 % ointment          1. Finger nail contusion, subsequent encounter  - mupirocin (BACTROBAN) 2 % ointment; Apply 1 g topically 2 times daily Apply topically 3 times daily.  Dispense: 30 g; Refill: 1    2. Finger infection  - mupirocin (BACTROBAN) 2 % ointment; Apply 1 g topically 2 times daily

## 2024-07-24 DIAGNOSIS — L08.9 FINGER INFECTION: ICD-10-CM

## 2024-07-24 DIAGNOSIS — S60.10XD: ICD-10-CM

## 2024-07-30 ENCOUNTER — OFFICE VISIT (OUTPATIENT)
Facility: CLINIC | Age: 74
End: 2024-07-30
Payer: MEDICARE

## 2024-07-30 VITALS
HEIGHT: 71 IN | TEMPERATURE: 97.5 F | HEART RATE: 70 BPM | SYSTOLIC BLOOD PRESSURE: 119 MMHG | RESPIRATION RATE: 18 BRPM | BODY MASS INDEX: 28.36 KG/M2 | DIASTOLIC BLOOD PRESSURE: 69 MMHG | WEIGHT: 202.6 LBS | OXYGEN SATURATION: 96 %

## 2024-07-30 DIAGNOSIS — I10 PRIMARY HYPERTENSION: ICD-10-CM

## 2024-07-30 DIAGNOSIS — S60.10XD: Primary | ICD-10-CM

## 2024-07-30 DIAGNOSIS — M79.89 FINGER SWELLING: ICD-10-CM

## 2024-07-30 PROCEDURE — 1036F TOBACCO NON-USER: CPT | Performed by: FAMILY MEDICINE

## 2024-07-30 PROCEDURE — 1123F ACP DISCUSS/DSCN MKR DOCD: CPT | Performed by: FAMILY MEDICINE

## 2024-07-30 PROCEDURE — 3074F SYST BP LT 130 MM HG: CPT | Performed by: FAMILY MEDICINE

## 2024-07-30 PROCEDURE — 99214 OFFICE O/P EST MOD 30 MIN: CPT | Performed by: FAMILY MEDICINE

## 2024-07-30 PROCEDURE — 3078F DIAST BP <80 MM HG: CPT | Performed by: FAMILY MEDICINE

## 2024-07-30 PROCEDURE — 3017F COLORECTAL CA SCREEN DOC REV: CPT | Performed by: FAMILY MEDICINE

## 2024-07-30 PROCEDURE — G8427 DOCREV CUR MEDS BY ELIG CLIN: HCPCS | Performed by: FAMILY MEDICINE

## 2024-07-30 PROCEDURE — G8419 CALC BMI OUT NRM PARAM NOF/U: HCPCS | Performed by: FAMILY MEDICINE

## 2024-07-30 RX ORDER — AMLODIPINE BESYLATE 2.5 MG/1
2.5 TABLET ORAL DAILY
Qty: 90 TABLET | Refills: 1 | Status: SHIPPED | OUTPATIENT
Start: 2024-07-30

## 2024-07-30 RX ORDER — DOXYCYCLINE 100 MG/1
100 CAPSULE ORAL 2 TIMES DAILY
Qty: 14 CAPSULE | Refills: 0 | Status: SHIPPED | OUTPATIENT
Start: 2024-07-30 | End: 2024-08-06

## 2024-07-30 NOTE — PROGRESS NOTES
[facility]    History of Present Illness:   Moo Holley is a 74 y.o. male with history of HTN, CVA, Hypothyroidism, DDD, Prostate Cancer, Vitamin D deficiency   CC: Follow up finger  History provided by patient and Records    HPI:  Fingernail abnormality: patient noting persistent issue with the left pointer finger nail abnormality with swelling at he DIP as well.  Denies any drainage at this time, and denies pain, though some stiffness.  Swelling waxes and wanes.  The area does not have headep up scab material.  Has been using Bactroban.  Patient notes fingernail abnormality about a year ago, possibly more, bu hte finger swelling only 6-8 weeks.    Health Maintenance  Health Maintenance Due   Topic Date Due    Hib vaccine (1 of 1 - Risk 1-dose series) Never done    Meningococcal (ACWY) vaccine (1 - Risk 2-dose series) Never done    Meningococcal B vaccine (1 of 4 - Increased Risk) Never done    Shingles vaccine (1 of 2) Never done    Respiratory Syncytial Virus (RSV) Pregnant or age 60 yrs+ (1 - 1-dose 60+ series) Never done    Pneumococcal 65+ years Vaccine (3 of 3 - PPSV23 or PCV20) 09/01/2020    COVID-19 Vaccine (6 - 2023-24 season) 09/01/2023    Prostate Specific Antigen (PSA) Screening or Monitoring  08/22/2024       Past Medical, Family, and Social History:     Current Outpatient Medications on File Prior to Visit   Medication Sig Dispense Refill    mupirocin (BACTROBAN) 2 % ointment APPLY ONE GRAM TOPICALLY TWICE DAILY TO THREE TIMES A DAY 22 g 1    rosuvastatin (CRESTOR) 10 MG tablet TAKE ONE TABLET BY MOUTH NIGHTLY ON MONDAY, WEDNESDAY AND FRIDAY OF EACH WEEK 36 tablet 1    PULMICORT FLEXHALER 180 MCG/ACT AEPB inhaler INHALE 1 PUFF BY MOUTH TWICE A DAY 1 each 5    levothyroxine (SYNTHROID) 137 MCG tablet Take 1 tablet by mouth daily 90 tablet 1    fluticasone (FLONASE) 50 MCG/ACT nasal spray USE 2 SPRAYS IN EACH NOSTRIL DAILY 16 g 5    VITAMIN D, CHOLECALCIFEROL, PO Take by mouth

## 2024-07-30 NOTE — PROGRESS NOTES
\"Have you been to the ER, urgent care clinic since your last visit?  Hospitalized since your last visit?\"    NO    “Have you seen or consulted any other health care providers outside of [facility] since your last visit?”    NO            Click Here for Release of Records Request

## 2024-08-05 RX ORDER — FLUTICASONE PROPIONATE 50 MCG
SPRAY, SUSPENSION (ML) NASAL
Qty: 16 G | Refills: 5 | Status: SHIPPED | OUTPATIENT
Start: 2024-08-05

## 2024-08-09 ENCOUNTER — TELEPHONE (OUTPATIENT)
Facility: CLINIC | Age: 74
End: 2024-08-09

## 2024-08-09 DIAGNOSIS — M79.89 FINGER SWELLING: Primary | ICD-10-CM

## 2024-08-09 DIAGNOSIS — S60.10XD: ICD-10-CM

## 2024-08-09 RX ORDER — PREDNISONE 10 MG/1
10 TABLET ORAL DAILY
Qty: 10 TABLET | Refills: 0 | Status: SHIPPED | OUTPATIENT
Start: 2024-08-09 | End: 2024-08-19

## 2024-08-09 NOTE — TELEPHONE ENCOUNTER
Spoke to patient and discussed Imaging results.  The Antibiotic does not appear to be helping.  The patient will trial steroid and see if it helps.  On follow up will conisder labs for rheumatological causes as well.    Christopher Perez MD  [facility]  08/09/24

## 2024-08-22 ENCOUNTER — OFFICE VISIT (OUTPATIENT)
Facility: CLINIC | Age: 74
End: 2024-08-22
Payer: MEDICARE

## 2024-08-22 VITALS
RESPIRATION RATE: 18 BRPM | WEIGHT: 200.8 LBS | HEIGHT: 71 IN | DIASTOLIC BLOOD PRESSURE: 73 MMHG | HEART RATE: 52 BPM | OXYGEN SATURATION: 100 % | SYSTOLIC BLOOD PRESSURE: 137 MMHG | TEMPERATURE: 97.4 F | BODY MASS INDEX: 28.11 KG/M2

## 2024-08-22 DIAGNOSIS — E78.2 MIXED HYPERLIPIDEMIA: ICD-10-CM

## 2024-08-22 DIAGNOSIS — E03.9 ACQUIRED HYPOTHYROIDISM: ICD-10-CM

## 2024-08-22 DIAGNOSIS — I10 PRIMARY HYPERTENSION: ICD-10-CM

## 2024-08-22 DIAGNOSIS — Z90.81 S/P SPLENECTOMY: ICD-10-CM

## 2024-08-22 DIAGNOSIS — M19.042 ARTHRITIS OF FINGER OF LEFT HAND: Primary | ICD-10-CM

## 2024-08-22 PROCEDURE — 1123F ACP DISCUSS/DSCN MKR DOCD: CPT | Performed by: FAMILY MEDICINE

## 2024-08-22 PROCEDURE — 99214 OFFICE O/P EST MOD 30 MIN: CPT | Performed by: FAMILY MEDICINE

## 2024-08-22 PROCEDURE — G8419 CALC BMI OUT NRM PARAM NOF/U: HCPCS | Performed by: FAMILY MEDICINE

## 2024-08-22 PROCEDURE — 1036F TOBACCO NON-USER: CPT | Performed by: FAMILY MEDICINE

## 2024-08-22 PROCEDURE — 3078F DIAST BP <80 MM HG: CPT | Performed by: FAMILY MEDICINE

## 2024-08-22 PROCEDURE — 3017F COLORECTAL CA SCREEN DOC REV: CPT | Performed by: FAMILY MEDICINE

## 2024-08-22 PROCEDURE — G8427 DOCREV CUR MEDS BY ELIG CLIN: HCPCS | Performed by: FAMILY MEDICINE

## 2024-08-22 PROCEDURE — 3075F SYST BP GE 130 - 139MM HG: CPT | Performed by: FAMILY MEDICINE

## 2024-08-22 RX ORDER — PREDNISONE 10 MG/1
10 TABLET ORAL DAILY
Qty: 14 TABLET | Refills: 0 | Status: SHIPPED | OUTPATIENT
Start: 2024-08-22 | End: 2024-09-05

## 2024-08-22 ASSESSMENT — ENCOUNTER SYMPTOMS
CHEST TIGHTNESS: 0
APNEA: 0

## 2024-08-22 NOTE — PROGRESS NOTES
\"Have you been to the ER, urgent care clinic since your last visit?  Hospitalized since your last visit?\"    NO    “Have you seen or consulted any other health care providers outside of [facility] since your last visit?”    NO            Click Here for Release of Records Request   
Used   Substance and Sexual Activity    Alcohol use: Yes     Alcohol/week: 5.0 standard drinks of alcohol    Drug use: No    Sexual activity: Defer     Social Determinants of Health     Financial Resource Strain: Low Risk  (5/19/2024)    Overall Financial Resource Strain (CARDIA)     Difficulty of Paying Living Expenses: Not very hard   Food Insecurity: No Food Insecurity (5/19/2024)    Hunger Vital Sign     Worried About Running Out of Food in the Last Year: Never true     Ran Out of Food in the Last Year: Never true   Transportation Needs: Unknown (5/19/2024)    PRAPARE - Transportation     Lack of Transportation (Non-Medical): No   Physical Activity: Insufficiently Active (5/22/2024)    Exercise Vital Sign     Days of Exercise per Week: 2 days     Minutes of Exercise per Session: 20 min   Housing Stability: Unknown (5/19/2024)    Housing Stability Vital Sign     Unstable Housing in the Last Year: No        Review of Systems   Review of Systems   Constitutional:  Negative for activity change and appetite change.   Respiratory:  Negative for apnea and chest tightness.    Cardiovascular:  Negative for chest pain.   Musculoskeletal:  Positive for arthralgias.         Objective:   /73 (Site: Right Upper Arm, Position: Sitting, Cuff Size: Medium Adult)   Pulse 52   Temp 97.4 °F (36.3 °C) (Temporal)   Resp 18   Ht 1.803 m (5' 11\")   Wt 91.1 kg (200 lb 12.8 oz)   SpO2 100%   BMI 28.01 kg/m²      Physical Exam  Vitals and nursing note reviewed.   Constitutional:       Appearance: Normal appearance.   HENT:      Head: Normocephalic and atraumatic.      Nose: No congestion.   Cardiovascular:      Rate and Rhythm: Normal rate and regular rhythm.      Pulses: Normal pulses.      Heart sounds: Normal heart sounds. No murmur heard.     No friction rub. No gallop.   Pulmonary:      Effort: Pulmonary effort is normal.      Breath sounds: Normal breath sounds.   Abdominal:      General: Abdomen is flat.

## 2024-08-23 LAB
ALBUMIN SERPL-MCNC: 3.9 G/DL (ref 3.5–5)
ALBUMIN/GLOB SERPL: 1.1 (ref 1.1–2.2)
ALP SERPL-CCNC: 70 U/L (ref 45–117)
ALT SERPL-CCNC: 29 U/L (ref 12–78)
ANION GAP SERPL CALC-SCNC: 2 MMOL/L (ref 5–15)
AST SERPL-CCNC: 28 U/L (ref 15–37)
BILIRUB SERPL-MCNC: 0.9 MG/DL (ref 0.2–1)
BUN SERPL-MCNC: 16 MG/DL (ref 6–20)
BUN/CREAT SERPL: 17 (ref 12–20)
CALCIUM SERPL-MCNC: 10.2 MG/DL (ref 8.5–10.1)
CHLORIDE SERPL-SCNC: 110 MMOL/L (ref 97–108)
CHOLEST SERPL-MCNC: 166 MG/DL
CO2 SERPL-SCNC: 26 MMOL/L (ref 21–32)
CREAT SERPL-MCNC: 0.93 MG/DL (ref 0.7–1.3)
CRP SERPL-MCNC: <0.29 MG/DL (ref 0–0.3)
ERYTHROCYTE [DISTWIDTH] IN BLOOD BY AUTOMATED COUNT: 14.2 % (ref 11.5–14.5)
ERYTHROCYTE [SEDIMENTATION RATE] IN BLOOD: 106 MM/HR (ref 0–20)
GLOBULIN SER CALC-MCNC: 3.5 G/DL (ref 2–4)
GLUCOSE SERPL-MCNC: 87 MG/DL (ref 65–100)
HCT VFR BLD AUTO: 40.3 % (ref 36.6–50.3)
HDLC SERPL-MCNC: 75 MG/DL
HDLC SERPL: 2.2 (ref 0–5)
HGB BLD-MCNC: 12.6 G/DL (ref 12.1–17)
LDLC SERPL CALC-MCNC: 77 MG/DL (ref 0–100)
MCH RBC QN AUTO: 29.6 PG (ref 26–34)
MCHC RBC AUTO-ENTMCNC: 31.3 G/DL (ref 30–36.5)
MCV RBC AUTO: 94.6 FL (ref 80–99)
NRBC # BLD: 0.02 K/UL (ref 0–0.01)
NRBC BLD-RTO: 0.3 PER 100 WBC
PLATELET # BLD AUTO: 221 K/UL (ref 150–400)
PMV BLD AUTO: 11.1 FL (ref 8.9–12.9)
POTASSIUM SERPL-SCNC: 4.8 MMOL/L (ref 3.5–5.1)
PROT SERPL-MCNC: 7.4 G/DL (ref 6.4–8.2)
RBC # BLD AUTO: 4.26 M/UL (ref 4.1–5.7)
SODIUM SERPL-SCNC: 138 MMOL/L (ref 136–145)
T4 FREE SERPL-MCNC: 1.1 NG/DL (ref 0.8–1.5)
TRIGL SERPL-MCNC: 70 MG/DL
TSH SERPL DL<=0.05 MIU/L-ACNC: 1.78 UIU/ML (ref 0.36–3.74)
VLDLC SERPL CALC-MCNC: 14 MG/DL
WBC # BLD AUTO: 5.8 K/UL (ref 4.1–11.1)

## 2024-08-25 LAB — RHEUMATOID FACT SERPL-ACNC: <10 IU/ML

## 2024-08-27 LAB
CCP IGA+IGG SERPL IA-ACNC: 6 UNITS (ref 0–19)
RHEUMATOID FACT SERPL-ACNC: <10 IU/ML

## 2024-08-30 DIAGNOSIS — E03.9 ACQUIRED HYPOTHYROIDISM: ICD-10-CM

## 2024-08-30 RX ORDER — LEVOTHYROXINE SODIUM 137 UG/1
137 TABLET ORAL DAILY
Qty: 90 TABLET | Refills: 1 | Status: SHIPPED | OUTPATIENT
Start: 2024-08-30

## 2024-10-30 NOTE — TELEPHONE ENCOUNTER
Received fax from [facility]. INR: 2.6 Patient is currently taking 4mg of Coumadin on Mon, Wed, Friday, Sunday and 5mg all other days. Per Dr. Gutierrez Erp: Continue current dose and recheck in 1 week. Patient verbalized understanding and stated that he saw his neurologist last Tuesday and his neurologist did not make any recommendations on how to handle his Coumadin -- would like to leave that up to primary doctor. details…

## 2024-11-25 ENCOUNTER — OFFICE VISIT (OUTPATIENT)
Facility: CLINIC | Age: 74
End: 2024-11-25
Payer: MEDICARE

## 2024-11-25 VITALS
HEART RATE: 53 BPM | DIASTOLIC BLOOD PRESSURE: 72 MMHG | RESPIRATION RATE: 18 BRPM | HEIGHT: 71 IN | WEIGHT: 207.2 LBS | OXYGEN SATURATION: 98 % | TEMPERATURE: 98.1 F | BODY MASS INDEX: 29.01 KG/M2 | SYSTOLIC BLOOD PRESSURE: 136 MMHG

## 2024-11-25 DIAGNOSIS — E03.9 ACQUIRED HYPOTHYROIDISM: ICD-10-CM

## 2024-11-25 DIAGNOSIS — I10 PRIMARY HYPERTENSION: ICD-10-CM

## 2024-11-25 DIAGNOSIS — M54.42 CHRONIC MIDLINE LOW BACK PAIN WITH LEFT-SIDED SCIATICA: ICD-10-CM

## 2024-11-25 DIAGNOSIS — J41.0 SIMPLE CHRONIC BRONCHITIS (HCC): Primary | ICD-10-CM

## 2024-11-25 DIAGNOSIS — Z90.81 S/P SPLENECTOMY: ICD-10-CM

## 2024-11-25 DIAGNOSIS — E78.2 MIXED HYPERLIPIDEMIA: ICD-10-CM

## 2024-11-25 DIAGNOSIS — G89.29 CHRONIC MIDLINE LOW BACK PAIN WITH LEFT-SIDED SCIATICA: ICD-10-CM

## 2024-11-25 PROCEDURE — 3023F SPIROM DOC REV: CPT | Performed by: FAMILY MEDICINE

## 2024-11-25 PROCEDURE — G8427 DOCREV CUR MEDS BY ELIG CLIN: HCPCS | Performed by: FAMILY MEDICINE

## 2024-11-25 PROCEDURE — G8419 CALC BMI OUT NRM PARAM NOF/U: HCPCS | Performed by: FAMILY MEDICINE

## 2024-11-25 PROCEDURE — 3075F SYST BP GE 130 - 139MM HG: CPT | Performed by: FAMILY MEDICINE

## 2024-11-25 PROCEDURE — G8484 FLU IMMUNIZE NO ADMIN: HCPCS | Performed by: FAMILY MEDICINE

## 2024-11-25 PROCEDURE — 1159F MED LIST DOCD IN RCRD: CPT | Performed by: FAMILY MEDICINE

## 2024-11-25 PROCEDURE — 1123F ACP DISCUSS/DSCN MKR DOCD: CPT | Performed by: FAMILY MEDICINE

## 2024-11-25 PROCEDURE — 99214 OFFICE O/P EST MOD 30 MIN: CPT | Performed by: FAMILY MEDICINE

## 2024-11-25 PROCEDURE — 3078F DIAST BP <80 MM HG: CPT | Performed by: FAMILY MEDICINE

## 2024-11-25 PROCEDURE — 1036F TOBACCO NON-USER: CPT | Performed by: FAMILY MEDICINE

## 2024-11-25 PROCEDURE — 1126F AMNT PAIN NOTED NONE PRSNT: CPT | Performed by: FAMILY MEDICINE

## 2024-11-25 PROCEDURE — 1160F RVW MEDS BY RX/DR IN RCRD: CPT | Performed by: FAMILY MEDICINE

## 2024-11-25 PROCEDURE — 3017F COLORECTAL CA SCREEN DOC REV: CPT | Performed by: FAMILY MEDICINE

## 2024-11-25 RX ORDER — AMLODIPINE BESYLATE 2.5 MG/1
2.5 TABLET ORAL DAILY
Qty: 90 TABLET | Refills: 1 | Status: SHIPPED | OUTPATIENT
Start: 2024-11-25

## 2024-11-25 ASSESSMENT — ENCOUNTER SYMPTOMS
APNEA: 0
CHOKING: 0
CHEST TIGHTNESS: 0

## 2024-11-25 NOTE — PROGRESS NOTES
\"Have you been to the ER, urgent care clinic since your last visit?  Hospitalized since your last visit?\"    NO    “Have you seen or consulted any other health care providers outside our system since your last visit?”    NO           
controlled Triglycerides, LDL, Cholesterol.  - Lipid Panel; Future    6. S/P splenectomy      Follow-up and Dispositions    Return in about 3 months (around 2/25/2025).           I have discussed the diagnosis with the patient and the intended plan as seen in the above orders.  Social history, medical history, and labs were reviewed.  The patient has received an after-visit summary and questions were answered concerning future plans.  I have discussed medication side effects and warnings with the patient as well.    Christopher Perez MD  [facility]  11/25/24

## 2024-11-26 LAB
ALBUMIN SERPL-MCNC: 3.8 G/DL (ref 3.5–5)
ALBUMIN/GLOB SERPL: 1.2 (ref 1.1–2.2)
ALP SERPL-CCNC: 65 U/L (ref 45–117)
ALT SERPL-CCNC: 25 U/L (ref 12–78)
ANION GAP SERPL CALC-SCNC: 6 MMOL/L (ref 2–12)
AST SERPL-CCNC: 27 U/L (ref 15–37)
BILIRUB SERPL-MCNC: 1.2 MG/DL (ref 0.2–1)
BUN SERPL-MCNC: 13 MG/DL (ref 6–20)
BUN/CREAT SERPL: 15 (ref 12–20)
CALCIUM SERPL-MCNC: 10.2 MG/DL (ref 8.5–10.1)
CHLORIDE SERPL-SCNC: 111 MMOL/L (ref 97–108)
CHOLEST SERPL-MCNC: 156 MG/DL
CO2 SERPL-SCNC: 24 MMOL/L (ref 21–32)
CREAT SERPL-MCNC: 0.86 MG/DL (ref 0.7–1.3)
ERYTHROCYTE [DISTWIDTH] IN BLOOD BY AUTOMATED COUNT: 13.7 % (ref 11.5–14.5)
GLOBULIN SER CALC-MCNC: 3.3 G/DL (ref 2–4)
GLUCOSE SERPL-MCNC: 98 MG/DL (ref 65–100)
HCT VFR BLD AUTO: 38.9 % (ref 36.6–50.3)
HDLC SERPL-MCNC: 69 MG/DL
HDLC SERPL: 2.3 (ref 0–5)
HGB BLD-MCNC: 12.4 G/DL (ref 12.1–17)
LDLC SERPL CALC-MCNC: 73.6 MG/DL (ref 0–100)
MCH RBC QN AUTO: 29.8 PG (ref 26–34)
MCHC RBC AUTO-ENTMCNC: 31.9 G/DL (ref 30–36.5)
MCV RBC AUTO: 93.5 FL (ref 80–99)
NRBC # BLD: 0 K/UL (ref 0–0.01)
NRBC BLD-RTO: 0 PER 100 WBC
PLATELET # BLD AUTO: 225 K/UL (ref 150–400)
PMV BLD AUTO: 11.2 FL (ref 8.9–12.9)
POTASSIUM SERPL-SCNC: 4.1 MMOL/L (ref 3.5–5.1)
PROT SERPL-MCNC: 7.1 G/DL (ref 6.4–8.2)
RBC # BLD AUTO: 4.16 M/UL (ref 4.1–5.7)
SODIUM SERPL-SCNC: 141 MMOL/L (ref 136–145)
T4 FREE SERPL-MCNC: 1.3 NG/DL (ref 0.8–1.5)
TRIGL SERPL-MCNC: 67 MG/DL
TSH SERPL DL<=0.05 MIU/L-ACNC: 0.86 UIU/ML (ref 0.36–3.74)
VLDLC SERPL CALC-MCNC: 13.4 MG/DL
WBC # BLD AUTO: 5.4 K/UL (ref 4.1–11.1)

## 2024-11-29 DIAGNOSIS — E78.2 MIXED HYPERLIPIDEMIA: ICD-10-CM

## 2024-11-29 RX ORDER — ROSUVASTATIN CALCIUM 10 MG/1
TABLET, COATED ORAL
Qty: 36 TABLET | Refills: 1 | Status: SHIPPED | OUTPATIENT
Start: 2024-11-29

## 2025-01-01 DIAGNOSIS — J41.0 SIMPLE CHRONIC BRONCHITIS (HCC): ICD-10-CM

## 2025-01-01 DIAGNOSIS — E78.2 MIXED HYPERLIPIDEMIA: ICD-10-CM

## 2025-01-01 DIAGNOSIS — I10 PRIMARY HYPERTENSION: ICD-10-CM

## 2025-01-01 DIAGNOSIS — E03.9 ACQUIRED HYPOTHYROIDISM: ICD-10-CM

## 2025-01-02 RX ORDER — ROSUVASTATIN CALCIUM 10 MG/1
TABLET, COATED ORAL
Qty: 36 TABLET | Refills: 1 | Status: SHIPPED | OUTPATIENT
Start: 2025-01-02

## 2025-01-02 RX ORDER — AMLODIPINE BESYLATE 2.5 MG/1
2.5 TABLET ORAL DAILY
Qty: 90 TABLET | Refills: 1 | Status: SHIPPED | OUTPATIENT
Start: 2025-01-02

## 2025-01-02 RX ORDER — LEVOTHYROXINE SODIUM 137 UG/1
137 TABLET ORAL DAILY
Qty: 90 TABLET | Refills: 1 | Status: SHIPPED | OUTPATIENT
Start: 2025-01-02

## 2025-01-13 ENCOUNTER — TELEPHONE (OUTPATIENT)
Facility: CLINIC | Age: 75
End: 2025-01-13

## 2025-01-13 DIAGNOSIS — J41.0 SIMPLE CHRONIC BRONCHITIS (HCC): Primary | ICD-10-CM

## 2025-01-13 RX ORDER — FLUTICASONE PROPIONATE AND SALMETEROL 250; 50 UG/1; UG/1
1 POWDER RESPIRATORY (INHALATION) EVERY 12 HOURS
Qty: 60 EACH | Refills: 3 | Status: SHIPPED | OUTPATIENT
Start: 2025-01-13

## 2025-01-13 NOTE — TELEPHONE ENCOUNTER
Will order Wixela, will await Prior Authorization.    Christopher Perez MD  [facility]  01/13/25

## 2025-01-13 NOTE — TELEPHONE ENCOUNTER
Pt would like to discuss his inhaler Rx. Pt states the Pulmicort cost has gone up for the pt.. Pt has spoke with his insurance and they suggested he ask his pcp about being switched to Wixela. Please advise.

## 2025-02-24 SDOH — ECONOMIC STABILITY: INCOME INSECURITY: IN THE LAST 12 MONTHS, WAS THERE A TIME WHEN YOU WERE NOT ABLE TO PAY THE MORTGAGE OR RENT ON TIME?: NO

## 2025-02-24 SDOH — ECONOMIC STABILITY: FOOD INSECURITY: WITHIN THE PAST 12 MONTHS, THE FOOD YOU BOUGHT JUST DIDN'T LAST AND YOU DIDN'T HAVE MONEY TO GET MORE.: NEVER TRUE

## 2025-02-24 SDOH — ECONOMIC STABILITY: TRANSPORTATION INSECURITY
IN THE PAST 12 MONTHS, HAS THE LACK OF TRANSPORTATION KEPT YOU FROM MEDICAL APPOINTMENTS OR FROM GETTING MEDICATIONS?: NO

## 2025-02-24 SDOH — ECONOMIC STABILITY: FOOD INSECURITY: WITHIN THE PAST 12 MONTHS, YOU WORRIED THAT YOUR FOOD WOULD RUN OUT BEFORE YOU GOT MONEY TO BUY MORE.: NEVER TRUE

## 2025-02-24 SDOH — HEALTH STABILITY: PHYSICAL HEALTH: ON AVERAGE, HOW MANY DAYS PER WEEK DO YOU ENGAGE IN MODERATE TO STRENUOUS EXERCISE (LIKE A BRISK WALK)?: 7 DAYS

## 2025-02-24 SDOH — HEALTH STABILITY: PHYSICAL HEALTH: ON AVERAGE, HOW MANY MINUTES DO YOU ENGAGE IN EXERCISE AT THIS LEVEL?: 10 MIN

## 2025-02-24 ASSESSMENT — PATIENT HEALTH QUESTIONNAIRE - PHQ9
SUM OF ALL RESPONSES TO PHQ QUESTIONS 1-9: 0
1. LITTLE INTEREST OR PLEASURE IN DOING THINGS: NOT AT ALL
SUM OF ALL RESPONSES TO PHQ QUESTIONS 1-9: 0
2. FEELING DOWN, DEPRESSED OR HOPELESS: NOT AT ALL
SUM OF ALL RESPONSES TO PHQ QUESTIONS 1-9: 0
SUM OF ALL RESPONSES TO PHQ QUESTIONS 1-9: 0
SUM OF ALL RESPONSES TO PHQ9 QUESTIONS 1 & 2: 0

## 2025-02-24 ASSESSMENT — LIFESTYLE VARIABLES
HOW OFTEN DO YOU HAVE A DRINK CONTAINING ALCOHOL: 2-3 TIMES A WEEK
HOW MANY STANDARD DRINKS CONTAINING ALCOHOL DO YOU HAVE ON A TYPICAL DAY: 1 OR 2
HOW OFTEN DO YOU HAVE A DRINK CONTAINING ALCOHOL: 4
HOW OFTEN DO YOU HAVE SIX OR MORE DRINKS ON ONE OCCASION: 1
HOW MANY STANDARD DRINKS CONTAINING ALCOHOL DO YOU HAVE ON A TYPICAL DAY: 1

## 2025-02-25 ENCOUNTER — OFFICE VISIT (OUTPATIENT)
Facility: CLINIC | Age: 75
End: 2025-02-25

## 2025-02-25 VITALS
HEART RATE: 63 BPM | WEIGHT: 206.6 LBS | SYSTOLIC BLOOD PRESSURE: 143 MMHG | TEMPERATURE: 97.7 F | HEIGHT: 71 IN | BODY MASS INDEX: 28.92 KG/M2 | OXYGEN SATURATION: 99 % | DIASTOLIC BLOOD PRESSURE: 73 MMHG | RESPIRATION RATE: 18 BRPM

## 2025-02-25 DIAGNOSIS — M48.061 FORAMINAL STENOSIS OF LUMBAR REGION: ICD-10-CM

## 2025-02-25 DIAGNOSIS — E78.2 MIXED HYPERLIPIDEMIA: ICD-10-CM

## 2025-02-25 DIAGNOSIS — I10 PRIMARY HYPERTENSION: ICD-10-CM

## 2025-02-25 DIAGNOSIS — J41.0 SIMPLE CHRONIC BRONCHITIS (HCC): ICD-10-CM

## 2025-02-25 DIAGNOSIS — M54.42 CHRONIC MIDLINE LOW BACK PAIN WITH LEFT-SIDED SCIATICA: ICD-10-CM

## 2025-02-25 DIAGNOSIS — E03.9 ACQUIRED HYPOTHYROIDISM: ICD-10-CM

## 2025-02-25 DIAGNOSIS — G89.29 CHRONIC MIDLINE LOW BACK PAIN WITH LEFT-SIDED SCIATICA: ICD-10-CM

## 2025-02-25 DIAGNOSIS — Z00.00 MEDICARE ANNUAL WELLNESS VISIT, SUBSEQUENT: Primary | ICD-10-CM

## 2025-02-25 DIAGNOSIS — Z85.46 HISTORY OF PROSTATE CANCER: ICD-10-CM

## 2025-02-25 DIAGNOSIS — Z90.81 S/P SPLENECTOMY: ICD-10-CM

## 2025-02-25 DIAGNOSIS — Z23 IMMUNIZATION DUE: ICD-10-CM

## 2025-02-25 DIAGNOSIS — E55.9 VITAMIN D DEFICIENCY, UNSPECIFIED: ICD-10-CM

## 2025-02-25 ASSESSMENT — ENCOUNTER SYMPTOMS
CHEST TIGHTNESS: 0
APNEA: 0
BACK PAIN: 1

## 2025-02-25 NOTE — PROGRESS NOTES
Medicare Annual Wellness Visit    Moo Holley is here for No chief complaint on file.    Assessment & Plan   Medicare annual wellness visit, subsequent  Primary hypertension  -     Comprehensive Metabolic Panel; Future  -     CBC; Future  Simple chronic bronchitis (HCC)  Acquired hypothyroidism  -     TSH; Future  -     T4, Free; Future  Mixed hyperlipidemia  -     Lipid Panel; Future  Chronic midline low back pain with left-sided sciatica  History of prostate cancer  Foraminal stenosis of lumbar region  Vitamin D deficiency, unspecified  -     Vitamin D 25 Hydroxy; Future  S/P splenectomy  Immunization due  -     Tetanus-Diphth-Acell Pertussis (BOOSTRIX) 5-2.5-18.5 LF-MCG/0.5 injection; Inject 0.5 mLs into the muscle once for 1 dose, Disp-0.5 mL, R-0Normal       No follow-ups on file.     Subjective     Patient's complete Health Risk Assessment and screening values have been reviewed and are found in Flowsheets. The following problems were reviewed today and where indicated follow up appointments were made and/or referrals ordered.    Positive Risk Factor Screenings with Interventions:                   Vision Screen:  Do you have difficulty driving, watching TV, or doing any of your daily activities because of your eyesight?: No  Have you had an eye exam within the past year?: (!) No  Interventions:   Patient encouraged to make appointment with their eye specialist      Advanced Directives:  Do you have a Living Will?: (!) No    Intervention:  has NO advanced directive - information provided        Objective   Vitals:    02/25/25 0836   BP: (!) 143/73   Site: Right Upper Arm   Position: Sitting   Cuff Size: Large Adult   Pulse: 63   Resp: 18   Temp: 97.7 °F (36.5 °C)   TempSrc: Temporal   SpO2: 99%   Weight: 93.7 kg (206 lb 9.6 oz)   Height: 1.803 m (5' 11\")      Body mass index is 28.81 kg/m².                 Allergies   Allergen Reactions    Atorvastatin Other (See Comments)     Other reaction(s):

## 2025-02-25 NOTE — PROGRESS NOTES
\"Have you been to the ER, urgent care clinic since your last visit?  Hospitalized since your last visit?\"    NO    “Have you seen or consulted any other health care providers outside of [facility] since your last visit?”    NO          Click Here for Release of Records Request

## 2025-02-25 NOTE — PROGRESS NOTES
[facility]    History of Present Illness:   Moo Holley is a 74 y.o. male with history of HTN, CVA, Hypothyroidism, DDD, Prostate Cancer, Vitamin D deficiency   CC: Medicare Wellness, Follow up   History provided by patient and Records    HPI:  S/P Splenectomy: Patient Is interested in TDAP    Low back pain: Chronic and persistent issue that is overall stable.  No significant change.    Hypertension Follow up:  The patient reports:  taking medications as instructed, no medication side effects noted, no TIA's, no chest pain on exertion, no dyspnea on exertion, no swelling of ankles, no orthostatic dizziness or lightheadedness, no orthopnea or paroxysmal nocturnal dyspnea.     BP Readings from Last 3 Encounters:   02/25/25 (!) 143/73   11/25/24 136/72   08/22/24 137/73      COPD: is taking Wixela, is Overall controlled.    Thyroid Disease Follow up:  Currently takes Levothyroxine 137 mcg.  Thyroid medication has been has not been changed since last medication check and labs.  Patient denies fatigue, nervousness, weight changes, heat or cold intolerance, bowel changes, skin changes, cardiovascular symptoms, hair loss, feeling excessive energy, tremor, palpitations, and/or weight loss.    Lab Results   Component Value Date/Time    TSH 0.86 11/25/2024 09:50 AM      Hypertriglyceridemia Follow up:   Cardiovascular risks for him are: hypertension  hyperlipidemia.   Current Medications:  rosuvastatin - 10 MG    Compliance: Yes   Myalgias: No   Fatigue: No   Other side effects: No     Wt Readings from Last 3 Encounters:   02/25/25 93.7 kg (206 lb 9.6 oz)   11/25/24 94 kg (207 lb 3.2 oz)   08/22/24 91.1 kg (200 lb 12.8 oz)       Lab Results   Component Value Date/Time    CHOL 156 11/25/2024 09:50 AM    HDL 69 11/25/2024 09:50 AM    LDL 73.6 11/25/2024 09:50 AM    LDL 80.4 02/22/2024 10:00 AM    VLDL 13.4 11/25/2024 09:50 AM    VLDL 14 02/08/2022 12:00 AM      Lab Results   Component Value Date/Time

## 2025-02-26 LAB
25(OH)D3 SERPL-MCNC: 65.7 NG/ML (ref 30–100)
ALBUMIN SERPL-MCNC: 3.8 G/DL (ref 3.5–5)
ALBUMIN/GLOB SERPL: 1.1 (ref 1.1–2.2)
ALP SERPL-CCNC: 66 U/L (ref 45–117)
ALT SERPL-CCNC: 25 U/L (ref 12–78)
ANION GAP SERPL CALC-SCNC: 6 MMOL/L (ref 2–12)
AST SERPL-CCNC: 26 U/L (ref 15–37)
BILIRUB SERPL-MCNC: 1 MG/DL (ref 0.2–1)
BUN SERPL-MCNC: 14 MG/DL (ref 6–20)
BUN/CREAT SERPL: 18 (ref 12–20)
CALCIUM SERPL-MCNC: 10.5 MG/DL (ref 8.5–10.1)
CHLORIDE SERPL-SCNC: 109 MMOL/L (ref 97–108)
CHOLEST SERPL-MCNC: 151 MG/DL
CO2 SERPL-SCNC: 24 MMOL/L (ref 21–32)
COMMENT:: NORMAL
CREAT SERPL-MCNC: 0.79 MG/DL (ref 0.7–1.3)
ERYTHROCYTE [DISTWIDTH] IN BLOOD BY AUTOMATED COUNT: 13.9 % (ref 11.5–14.5)
GLOBULIN SER CALC-MCNC: 3.5 G/DL (ref 2–4)
GLUCOSE SERPL-MCNC: 96 MG/DL (ref 65–100)
HCT VFR BLD AUTO: 39 % (ref 36.6–50.3)
HDLC SERPL-MCNC: 65 MG/DL
HDLC SERPL: 2.3 (ref 0–5)
HGB BLD-MCNC: 12.4 G/DL (ref 12.1–17)
LDLC SERPL CALC-MCNC: 72.8 MG/DL (ref 0–100)
MCH RBC QN AUTO: 29.5 PG (ref 26–34)
MCHC RBC AUTO-ENTMCNC: 31.8 G/DL (ref 30–36.5)
MCV RBC AUTO: 92.6 FL (ref 80–99)
NRBC # BLD: 0 K/UL (ref 0–0.01)
NRBC BLD-RTO: 0 PER 100 WBC
PLATELET # BLD AUTO: 251 K/UL (ref 150–400)
PMV BLD AUTO: 11.1 FL (ref 8.9–12.9)
POTASSIUM SERPL-SCNC: 4.1 MMOL/L (ref 3.5–5.1)
PROT SERPL-MCNC: 7.3 G/DL (ref 6.4–8.2)
RBC # BLD AUTO: 4.21 M/UL (ref 4.1–5.7)
SODIUM SERPL-SCNC: 139 MMOL/L (ref 136–145)
SPECIMEN HOLD: NORMAL
T4 FREE SERPL-MCNC: 1.3 NG/DL (ref 0.8–1.5)
TRIGL SERPL-MCNC: 66 MG/DL
TSH SERPL DL<=0.05 MIU/L-ACNC: 0.9 UIU/ML (ref 0.36–3.74)
VLDLC SERPL CALC-MCNC: 13.2 MG/DL
WBC # BLD AUTO: 6.1 K/UL (ref 4.1–11.1)

## 2025-05-28 ENCOUNTER — OFFICE VISIT (OUTPATIENT)
Age: 75
End: 2025-05-28

## 2025-05-28 VITALS
OXYGEN SATURATION: 96 % | TEMPERATURE: 99.3 F | BODY MASS INDEX: 27.97 KG/M2 | DIASTOLIC BLOOD PRESSURE: 81 MMHG | HEIGHT: 71 IN | HEART RATE: 74 BPM | WEIGHT: 199.8 LBS | SYSTOLIC BLOOD PRESSURE: 138 MMHG | RESPIRATION RATE: 18 BRPM

## 2025-05-28 DIAGNOSIS — R03.0 ELEVATED BLOOD PRESSURE READING: ICD-10-CM

## 2025-05-28 DIAGNOSIS — L91.0 KELOID OF SKIN: ICD-10-CM

## 2025-05-28 DIAGNOSIS — L03.313 CELLULITIS OF CHEST WALL: Primary | ICD-10-CM

## 2025-05-28 RX ORDER — DOXYCYCLINE HYCLATE 100 MG
100 TABLET ORAL 2 TIMES DAILY
Qty: 14 TABLET | Refills: 0 | Status: SHIPPED | OUTPATIENT
Start: 2025-05-28 | End: 2025-06-04

## 2025-05-28 NOTE — PROGRESS NOTES
2025   Moo Holley (: 1950) is a 75 y.o. male, New patient, here for evaluation of the following chief complaint(s):  Keloid (Pt c/o possible infected keloid located on stomach /Onset 3 days ago/)     ASSESSMENT/PLAN:  Below is the assessment and plan developed based on review of pertinent history, physical exam, labs, studies, and medications.       Assessment & Plan  Cellulitis of chest wall  Follow up with derm and PCP   Go to ER for new or worsening symptoms     Orders:    doxycycline hyclate (VIBRA-TABS) 100 MG tablet; Take 1 tablet by mouth 2 times daily for 7 days    Nuria Connolly MD, [facility]    Keloid of skin      Orders:    Nuria Connolly MD, [facility]atologyJose Alfredo ([facility])    Elevated blood pressure reading      Orders:    Ambulatory referral to Internal Medicine      Handout given with care instructions  OTC for symptom management. Increase fluid intake, ensure adequate nutritional intake.  Follow up with PCP as needed.  Go to ED with development of any acute symptoms.     Follow up:  Return in about 1 week (around 2025), or if symptoms worsen or fail to improve, for BP Check with medication change.  Follow up immediately for any new, worsening or changes or if symptoms are not improving over the next 5-7 days.     SUBJECTIVE/OBJECTIVE:    History provided by:  Patient   used: No           Keloid (Pt c/o possible infected keloid located on stomach /Onset 3 days ago/)      No results found for any visits on 25.    No results found for this visit on 25.  XR Results (most recent):  @BSHSILASTIMGCAT(XBW1344:1)@         Review of Systems   Constitutional: Negative.    HENT: Negative.     Eyes: Negative.    Respiratory: Negative.     Cardiovascular: Negative.    Gastrointestinal: Negative.    Endocrine: Negative.    Genitourinary: Negative.    Musculoskeletal: Negative.    Skin: Negative.    Allergic/Immunologic:

## 2025-06-06 ENCOUNTER — OFFICE VISIT (OUTPATIENT)
Facility: CLINIC | Age: 75
End: 2025-06-06
Payer: MEDICARE

## 2025-06-06 VITALS
OXYGEN SATURATION: 97 % | HEIGHT: 71 IN | BODY MASS INDEX: 27.86 KG/M2 | SYSTOLIC BLOOD PRESSURE: 107 MMHG | WEIGHT: 199 LBS | RESPIRATION RATE: 14 BRPM | DIASTOLIC BLOOD PRESSURE: 63 MMHG | HEART RATE: 60 BPM | TEMPERATURE: 97.7 F

## 2025-06-06 DIAGNOSIS — I10 PRIMARY HYPERTENSION: Primary | ICD-10-CM

## 2025-06-06 DIAGNOSIS — L91.0 KELOID: ICD-10-CM

## 2025-06-06 DIAGNOSIS — M54.42 CHRONIC MIDLINE LOW BACK PAIN WITH LEFT-SIDED SCIATICA: ICD-10-CM

## 2025-06-06 DIAGNOSIS — E78.2 MIXED HYPERLIPIDEMIA: ICD-10-CM

## 2025-06-06 DIAGNOSIS — E55.9 VITAMIN D DEFICIENCY, UNSPECIFIED: ICD-10-CM

## 2025-06-06 DIAGNOSIS — G89.29 CHRONIC MIDLINE LOW BACK PAIN WITH LEFT-SIDED SCIATICA: ICD-10-CM

## 2025-06-06 DIAGNOSIS — J41.0 SIMPLE CHRONIC BRONCHITIS (HCC): ICD-10-CM

## 2025-06-06 PROCEDURE — 1160F RVW MEDS BY RX/DR IN RCRD: CPT | Performed by: FAMILY MEDICINE

## 2025-06-06 PROCEDURE — G2211 COMPLEX E/M VISIT ADD ON: HCPCS | Performed by: FAMILY MEDICINE

## 2025-06-06 PROCEDURE — G8427 DOCREV CUR MEDS BY ELIG CLIN: HCPCS | Performed by: FAMILY MEDICINE

## 2025-06-06 PROCEDURE — 3023F SPIROM DOC REV: CPT | Performed by: FAMILY MEDICINE

## 2025-06-06 PROCEDURE — 1159F MED LIST DOCD IN RCRD: CPT | Performed by: FAMILY MEDICINE

## 2025-06-06 PROCEDURE — 1036F TOBACCO NON-USER: CPT | Performed by: FAMILY MEDICINE

## 2025-06-06 PROCEDURE — 1126F AMNT PAIN NOTED NONE PRSNT: CPT | Performed by: FAMILY MEDICINE

## 2025-06-06 PROCEDURE — G8419 CALC BMI OUT NRM PARAM NOF/U: HCPCS | Performed by: FAMILY MEDICINE

## 2025-06-06 PROCEDURE — 1123F ACP DISCUSS/DSCN MKR DOCD: CPT | Performed by: FAMILY MEDICINE

## 2025-06-06 PROCEDURE — 3074F SYST BP LT 130 MM HG: CPT | Performed by: FAMILY MEDICINE

## 2025-06-06 PROCEDURE — 99214 OFFICE O/P EST MOD 30 MIN: CPT | Performed by: FAMILY MEDICINE

## 2025-06-06 PROCEDURE — 3017F COLORECTAL CA SCREEN DOC REV: CPT | Performed by: FAMILY MEDICINE

## 2025-06-06 PROCEDURE — 3078F DIAST BP <80 MM HG: CPT | Performed by: FAMILY MEDICINE

## 2025-06-06 ASSESSMENT — ENCOUNTER SYMPTOMS
APNEA: 0
CHEST TIGHTNESS: 0

## 2025-06-06 NOTE — PROGRESS NOTES
Chief Complaint   Patient presents with    3 Month Follow-Up         \"Have you been to the ER, urgent care clinic since your last visit?  Hospitalized since your last visit?\"    YES- Urgent care [place]     “Have you seen or consulted any other health care providers outside of [facility] since your last visit?”    NO          Click Here for Release of Records Request     Health Maintenance Due   Topic Date Due    Hib vaccine (1 of 1 - Risk 1-dose series) Never done    Meningococcal (ACWY) vaccine (1 - Risk 2-dose series) Never done    Meningococcal B vaccine (1 of 5 - Increased Risk) Never done    Shingles vaccine (1 of 2) Never done    Pneumococcal 50+ years Vaccine (3 of 3 - PPSV23, PCV20 or PCV21) 09/01/2020    Prostate Specific Antigen (PSA) Screening or Monitoring  08/22/2024    DTaP/Tdap/Td vaccine (2 - Td or Tdap) 11/11/2024    COVID-19 Vaccine (7 - 2024-25 season) 11/13/2024

## 2025-06-06 NOTE — PROGRESS NOTES
[facility]    History of Present Illness:   Moo Holley is a 75 y.o. male with history of HTN, CVA, Hypothyroidism, DDD, Prostate Cancer, Vitamin D deficiency   CC: Follow up  History provided by patient and Records    HPI:    Keloid: Large Keloid on the chest, recent cellulitis that is resolving.  Was given referal to Dermatology    Low back pain: Chronic and persistent issue that is overall stable.  No significant change.     Hypertension Follow up:  The patient reports:  taking medications as instructed, no medication side effects noted, no TIA's, no chest pain on exertion, no dyspnea on exertion, no swelling of ankles, no orthostatic dizziness or lightheadedness, no orthopnea or paroxysmal nocturnal dyspnea.     BP Readings from Last 3 Encounters:   06/06/25 107/63   05/28/25 138/81   02/25/25 (!) 143/73      COPD: is taking Wixela, is Overall controlled.     Thyroid Disease Follow up:  Currently takes Levothyroxine 137 mcg.  Thyroid medication has not been changed since last medication check and labs.  Patient denies fatigue, nervousness, weight changes, heat or cold intolerance, bowel changes, skin changes, cardiovascular symptoms, hair loss, feeling excessive energy, tremor, palpitations, and/or weight loss.      Lab Results   Component Value Date/Time    TSH 0.90 02/25/2025 09:40 AM         Hypertriglyceridemia Follow up:   Cardiovascular risks for him are: hypertension  hyperlipidemia.   Current Medications:  rosuvastatin - 10 MG               Compliance: Yes              Myalgias: No              Fatigue: No              Other side effects: No     Wt Readings from Last 3 Encounters:   06/06/25 90.3 kg (199 lb)   05/28/25 90.6 kg (199 lb 12.8 oz)   02/25/25 93.7 kg (206 lb 9.6 oz)       Lab Results   Component Value Date/Time    CHOL 151 02/25/2025 09:40 AM    HDL 65 02/25/2025 09:40 AM    LDL 72.8 02/25/2025 09:40 AM    LDL 80.4 02/22/2024 10:00 AM    VLDL 13.2 02/25/2025 09:40

## 2025-06-22 DIAGNOSIS — E78.2 MIXED HYPERLIPIDEMIA: ICD-10-CM

## 2025-06-23 RX ORDER — ROSUVASTATIN CALCIUM 10 MG/1
TABLET, COATED ORAL
Qty: 36 TABLET | Refills: 1 | Status: SHIPPED | OUTPATIENT
Start: 2025-06-23

## 2025-07-07 DIAGNOSIS — I10 PRIMARY HYPERTENSION: ICD-10-CM

## 2025-07-07 DIAGNOSIS — E78.2 MIXED HYPERLIPIDEMIA: ICD-10-CM

## 2025-07-08 RX ORDER — AMLODIPINE BESYLATE 2.5 MG/1
2.5 TABLET ORAL DAILY
Qty: 90 TABLET | Refills: 1 | Status: SHIPPED | OUTPATIENT
Start: 2025-07-08

## 2025-07-08 RX ORDER — ROSUVASTATIN CALCIUM 10 MG/1
TABLET, COATED ORAL
Qty: 36 TABLET | Refills: 1 | Status: SHIPPED | OUTPATIENT
Start: 2025-07-08

## 2025-07-29 DIAGNOSIS — J41.0 SIMPLE CHRONIC BRONCHITIS (HCC): ICD-10-CM

## 2025-07-29 RX ORDER — FLUTICASONE PROPIONATE AND SALMETEROL 250; 50 UG/1; UG/1
POWDER RESPIRATORY (INHALATION)
Qty: 60 EACH | Refills: 3 | Status: SHIPPED | OUTPATIENT
Start: 2025-07-29

## 2025-08-25 DIAGNOSIS — E03.9 ACQUIRED HYPOTHYROIDISM: ICD-10-CM

## 2025-08-25 RX ORDER — LEVOTHYROXINE SODIUM 137 UG/1
137 TABLET ORAL DAILY
Qty: 90 TABLET | Refills: 1 | Status: SHIPPED | OUTPATIENT
Start: 2025-08-25

## 2025-08-31 ENCOUNTER — PATIENT MESSAGE (OUTPATIENT)
Facility: CLINIC | Age: 75
End: 2025-08-31

## 2025-08-31 DIAGNOSIS — E03.9 ACQUIRED HYPOTHYROIDISM: ICD-10-CM

## 2025-08-31 DIAGNOSIS — E55.9 VITAMIN D DEFICIENCY, UNSPECIFIED: ICD-10-CM

## 2025-08-31 DIAGNOSIS — Z85.46 HISTORY OF PROSTATE CANCER: ICD-10-CM

## 2025-08-31 DIAGNOSIS — I10 PRIMARY HYPERTENSION: Primary | ICD-10-CM

## 2025-08-31 DIAGNOSIS — E78.2 MIXED HYPERLIPIDEMIA: ICD-10-CM
